# Patient Record
Sex: MALE | Race: WHITE | Employment: OTHER | URBAN - METROPOLITAN AREA
[De-identification: names, ages, dates, MRNs, and addresses within clinical notes are randomized per-mention and may not be internally consistent; named-entity substitution may affect disease eponyms.]

---

## 2017-01-01 ENCOUNTER — APPOINTMENT (OUTPATIENT)
Dept: GENERAL RADIOLOGY | Age: 82
DRG: 291 | End: 2017-01-01
Attending: NURSE PRACTITIONER
Payer: MEDICARE

## 2017-01-01 ENCOUNTER — HOME CARE VISIT (OUTPATIENT)
Dept: SCHEDULING | Facility: HOME HEALTH | Age: 82
End: 2017-01-01
Payer: MEDICARE

## 2017-01-01 ENCOUNTER — APPOINTMENT (OUTPATIENT)
Dept: GENERAL RADIOLOGY | Age: 82
DRG: 291 | End: 2017-01-01
Attending: EMERGENCY MEDICINE
Payer: MEDICARE

## 2017-01-01 ENCOUNTER — HOSPITAL ENCOUNTER (OUTPATIENT)
Dept: LAB | Age: 82
Discharge: HOME | End: 2017-01-27
Attending: INTERNAL MEDICINE
Payer: MEDICARE

## 2017-01-01 ENCOUNTER — HOME CARE VISIT (OUTPATIENT)
Dept: HOME HEALTH SERVICES | Facility: HOME HEALTH | Age: 82
End: 2017-01-01

## 2017-01-01 ENCOUNTER — HOME CARE VISIT (OUTPATIENT)
Dept: HOSPICE | Facility: HOSPICE | Age: 82
End: 2017-01-01
Payer: MEDICARE

## 2017-01-01 ENCOUNTER — HOSPITAL ENCOUNTER (OUTPATIENT)
Dept: LAB | Age: 82
Discharge: HOME | End: 2017-07-24
Payer: MEDICARE

## 2017-01-01 ENCOUNTER — HOSPITAL ENCOUNTER (OUTPATIENT)
Dept: LAB | Age: 82
Discharge: HOME | End: 2017-12-14
Attending: INTERNAL MEDICINE
Payer: MEDICARE

## 2017-01-01 ENCOUNTER — HOSPITAL ENCOUNTER (EMERGENCY)
Age: 82
Discharge: HOME | End: 2017-07-28
Attending: EMERGENCY MEDICINE
Payer: MEDICARE

## 2017-01-01 ENCOUNTER — HOME HEALTH ADMISSION (OUTPATIENT)
Dept: HOME HEALTH SERVICES | Facility: HOME HEALTH | Age: 82
End: 2017-01-01
Payer: MEDICARE

## 2017-01-01 ENCOUNTER — HOSPICE ADMISSION (OUTPATIENT)
Dept: HOSPICE | Facility: HOSPICE | Age: 82
End: 2017-01-01
Payer: MEDICARE

## 2017-01-01 ENCOUNTER — ANESTHESIA (OUTPATIENT)
Dept: SURGERY | Age: 82
DRG: 713 | End: 2017-01-01
Payer: MEDICARE

## 2017-01-01 ENCOUNTER — ANESTHESIA EVENT (OUTPATIENT)
Dept: SURGERY | Age: 82
DRG: 713 | End: 2017-01-01
Payer: MEDICARE

## 2017-01-01 ENCOUNTER — HOME CARE VISIT (OUTPATIENT)
Dept: HOME HEALTH SERVICES | Facility: HOME HEALTH | Age: 82
End: 2017-01-01
Payer: MEDICARE

## 2017-01-01 ENCOUNTER — HOSPITAL ENCOUNTER (OUTPATIENT)
Dept: LAB | Age: 82
Discharge: HOME | DRG: 291 | End: 2017-03-23
Payer: MEDICARE

## 2017-01-01 ENCOUNTER — TELEPHONE (OUTPATIENT)
Dept: CARDIOLOGY | Age: 82
End: 2017-01-01

## 2017-01-01 ENCOUNTER — HOSPITAL ENCOUNTER (INPATIENT)
Age: 82
LOS: 1 days | Discharge: HOME HEALTH | DRG: 713 | End: 2017-07-17
Attending: UROLOGY | Admitting: UROLOGY
Payer: MEDICARE

## 2017-01-01 ENCOUNTER — HOSPITAL ENCOUNTER (INPATIENT)
Age: 82
LOS: 5 days | Discharge: NURSING, REHAB OR LONG-TERM CARE | DRG: 291 | End: 2017-10-02
Attending: EMERGENCY MEDICINE | Admitting: INTERNAL MEDICINE
Payer: MEDICARE

## 2017-01-01 ENCOUNTER — HOSPITAL ENCOUNTER (INPATIENT)
Age: 82
LOS: 5 days | Discharge: NURSING, REHAB OR LONG-TERM CARE | DRG: 291 | End: 2017-03-28
Attending: EMERGENCY MEDICINE | Admitting: INTERNAL MEDICINE
Payer: MEDICARE

## 2017-01-01 ENCOUNTER — HOSPITAL ENCOUNTER (OUTPATIENT)
Dept: SURGERY | Age: 82
Discharge: HOME | End: 2017-07-11
Payer: MEDICARE

## 2017-01-01 ENCOUNTER — TELEPHONE (OUTPATIENT)
Dept: OTHER | Age: 82
End: 2017-01-01

## 2017-01-01 ENCOUNTER — APPOINTMENT (OUTPATIENT)
Dept: GENERAL RADIOLOGY | Age: 82
End: 2017-01-01
Attending: EMERGENCY MEDICINE
Payer: MEDICARE

## 2017-01-01 ENCOUNTER — DOCUMENTATION ONLY (OUTPATIENT)
Dept: OTHER | Age: 82
End: 2017-01-01

## 2017-01-01 ENCOUNTER — HOSPITAL ENCOUNTER (OUTPATIENT)
Dept: LAB | Age: 82
Discharge: HOME | End: 2017-06-26
Attending: INTERNAL MEDICINE
Payer: MEDICARE

## 2017-01-01 ENCOUNTER — APPOINTMENT (OUTPATIENT)
Dept: GENERAL RADIOLOGY | Age: 82
DRG: 291 | End: 2017-01-01
Payer: MEDICARE

## 2017-01-01 ENCOUNTER — PATIENT OUTREACH (OUTPATIENT)
Dept: CASE MANAGEMENT | Age: 82
End: 2017-01-01

## 2017-01-01 ENCOUNTER — SURGERY (OUTPATIENT)
Age: 82
End: 2017-01-01

## 2017-01-01 ENCOUNTER — APPOINTMENT (OUTPATIENT)
Dept: GENERAL RADIOLOGY | Age: 82
DRG: 291 | End: 2017-01-01
Attending: STUDENT IN AN ORGANIZED HEALTH CARE EDUCATION/TRAINING PROGRAM
Payer: MEDICARE

## 2017-01-01 VITALS
BODY MASS INDEX: 21.97 KG/M2 | WEIGHT: 140 LBS | OXYGEN SATURATION: 100 % | HEIGHT: 67 IN | RESPIRATION RATE: 16 BRPM | HEART RATE: 65 BPM | DIASTOLIC BLOOD PRESSURE: 61 MMHG | TEMPERATURE: 98.6 F | SYSTOLIC BLOOD PRESSURE: 130 MMHG

## 2017-01-01 VITALS — DIASTOLIC BLOOD PRESSURE: 60 MMHG | HEART RATE: 92 BPM | RESPIRATION RATE: 20 BRPM | SYSTOLIC BLOOD PRESSURE: 106 MMHG

## 2017-01-01 VITALS
DIASTOLIC BLOOD PRESSURE: 58 MMHG | HEIGHT: 65 IN | TEMPERATURE: 97.8 F | HEART RATE: 64 BPM | WEIGHT: 147.8 LBS | SYSTOLIC BLOOD PRESSURE: 133 MMHG | RESPIRATION RATE: 18 BRPM | OXYGEN SATURATION: 99 % | BODY MASS INDEX: 24.62 KG/M2

## 2017-01-01 VITALS
TEMPERATURE: 97.2 F | RESPIRATION RATE: 30 BRPM | DIASTOLIC BLOOD PRESSURE: 56 MMHG | HEART RATE: 92 BPM | SYSTOLIC BLOOD PRESSURE: 108 MMHG

## 2017-01-01 VITALS
TEMPERATURE: 96.5 F | SYSTOLIC BLOOD PRESSURE: 124 MMHG | DIASTOLIC BLOOD PRESSURE: 64 MMHG | HEART RATE: 67 BPM | OXYGEN SATURATION: 98 % | RESPIRATION RATE: 17 BRPM

## 2017-01-01 VITALS
TEMPERATURE: 98.1 F | HEART RATE: 67 BPM | RESPIRATION RATE: 20 BRPM | DIASTOLIC BLOOD PRESSURE: 51 MMHG | SYSTOLIC BLOOD PRESSURE: 104 MMHG | BODY MASS INDEX: 22.63 KG/M2 | WEIGHT: 140.8 LBS | HEIGHT: 66 IN | OXYGEN SATURATION: 98 %

## 2017-01-01 VITALS
TEMPERATURE: 98 F | BODY MASS INDEX: 23.86 KG/M2 | HEIGHT: 67 IN | OXYGEN SATURATION: 96 % | SYSTOLIC BLOOD PRESSURE: 136 MMHG | WEIGHT: 152 LBS | RESPIRATION RATE: 16 BRPM | HEART RATE: 72 BPM | DIASTOLIC BLOOD PRESSURE: 62 MMHG

## 2017-01-01 VITALS
RESPIRATION RATE: 17 BRPM | DIASTOLIC BLOOD PRESSURE: 60 MMHG | OXYGEN SATURATION: 95 % | SYSTOLIC BLOOD PRESSURE: 132 MMHG | TEMPERATURE: 96.4 F | HEART RATE: 64 BPM

## 2017-01-01 VITALS
SYSTOLIC BLOOD PRESSURE: 143 MMHG | RESPIRATION RATE: 18 BRPM | WEIGHT: 151.2 LBS | DIASTOLIC BLOOD PRESSURE: 68 MMHG | HEIGHT: 67 IN | OXYGEN SATURATION: 95 % | HEART RATE: 74 BPM | TEMPERATURE: 98 F | BODY MASS INDEX: 23.73 KG/M2

## 2017-01-01 VITALS — DIASTOLIC BLOOD PRESSURE: 60 MMHG | HEART RATE: 96 BPM | RESPIRATION RATE: 28 BRPM | SYSTOLIC BLOOD PRESSURE: 104 MMHG

## 2017-01-01 VITALS — SYSTOLIC BLOOD PRESSURE: 98 MMHG | HEART RATE: 96 BPM | DIASTOLIC BLOOD PRESSURE: 46 MMHG | RESPIRATION RATE: 32 BRPM

## 2017-01-01 VITALS
RESPIRATION RATE: 17 BRPM | SYSTOLIC BLOOD PRESSURE: 115 MMHG | TEMPERATURE: 97 F | DIASTOLIC BLOOD PRESSURE: 70 MMHG | OXYGEN SATURATION: 95 % | HEART RATE: 97 BPM

## 2017-01-01 VITALS — SYSTOLIC BLOOD PRESSURE: 96 MMHG | HEART RATE: 80 BPM | RESPIRATION RATE: 16 BRPM | DIASTOLIC BLOOD PRESSURE: 48 MMHG

## 2017-01-01 VITALS
HEIGHT: 66 IN | SYSTOLIC BLOOD PRESSURE: 124 MMHG | OXYGEN SATURATION: 99 % | WEIGHT: 140 LBS | BODY MASS INDEX: 22.5 KG/M2 | RESPIRATION RATE: 32 BRPM | HEART RATE: 72 BPM | DIASTOLIC BLOOD PRESSURE: 60 MMHG

## 2017-01-01 VITALS — SYSTOLIC BLOOD PRESSURE: 116 MMHG | DIASTOLIC BLOOD PRESSURE: 50 MMHG

## 2017-01-01 DIAGNOSIS — I48.0 PAF (PAROXYSMAL ATRIAL FIBRILLATION) (HCC): Chronic | ICD-10-CM

## 2017-01-01 DIAGNOSIS — I50.22 CHRONIC SYSTOLIC HEART FAILURE (HCC): Chronic | ICD-10-CM

## 2017-01-01 DIAGNOSIS — J96.01 ACUTE RESPIRATORY FAILURE WITH HYPOXIA (HCC): ICD-10-CM

## 2017-01-01 DIAGNOSIS — Z66 DNR (DO NOT RESUSCITATE): ICD-10-CM

## 2017-01-01 DIAGNOSIS — Z95.810 ICD (IMPLANTABLE CARDIOVERTER-DEFIBRILLATOR) IN PLACE: Chronic | ICD-10-CM

## 2017-01-01 DIAGNOSIS — I50.23 ACUTE ON CHRONIC SYSTOLIC CONGESTIVE HEART FAILURE (HCC): ICD-10-CM

## 2017-01-01 DIAGNOSIS — N18.30 CKD (CHRONIC KIDNEY DISEASE) STAGE 3, GFR 30-59 ML/MIN (HCC): Chronic | ICD-10-CM

## 2017-01-01 DIAGNOSIS — N18.9 CHRONIC KIDNEY DISEASE (CKD), UNSPECIFIED STAGE: ICD-10-CM

## 2017-01-01 DIAGNOSIS — R07.89 CHEST WALL PAIN: Primary | ICD-10-CM

## 2017-01-01 DIAGNOSIS — I10 ESSENTIAL HYPERTENSION: Chronic | ICD-10-CM

## 2017-01-01 DIAGNOSIS — R53.83 MALAISE AND FATIGUE: ICD-10-CM

## 2017-01-01 DIAGNOSIS — J90 PLEURAL EFFUSION, BILATERAL: ICD-10-CM

## 2017-01-01 DIAGNOSIS — J43.9 PULMONARY EMPHYSEMA, UNSPECIFIED EMPHYSEMA TYPE (HCC): ICD-10-CM

## 2017-01-01 DIAGNOSIS — E87.6 HYPOKALEMIA: ICD-10-CM

## 2017-01-01 DIAGNOSIS — J81.0 ACUTE PULMONARY EDEMA (HCC): ICD-10-CM
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
## 2017-01-01 DIAGNOSIS — I48.0 PAROXYSMAL ATRIAL FIBRILLATION (HCC): Chronic | ICD-10-CM

## 2017-01-01 DIAGNOSIS — E78.00 PURE HYPERCHOLESTEROLEMIA: Chronic | ICD-10-CM

## 2017-01-01 DIAGNOSIS — R29.6 FREQUENT FALLS: ICD-10-CM

## 2017-01-01 DIAGNOSIS — I50.9 CONGESTIVE HEART FAILURE, UNSPECIFIED CONGESTIVE HEART FAILURE CHRONICITY, UNSPECIFIED CONGESTIVE HEART FAILURE TYPE: Chronic | ICD-10-CM

## 2017-01-01 DIAGNOSIS — J90 PLEURAL EFFUSION: ICD-10-CM

## 2017-01-01 DIAGNOSIS — J43.9 PULMONARY EMPHYSEMA, UNSPECIFIED EMPHYSEMA TYPE (HCC): Chronic | ICD-10-CM

## 2017-01-01 DIAGNOSIS — I50.23 ACUTE ON CHRONIC SYSTOLIC CONGESTIVE HEART FAILURE (HCC): Primary | ICD-10-CM

## 2017-01-01 DIAGNOSIS — I50.23 ACUTE ON CHRONIC SYSTOLIC (CONGESTIVE) HEART FAILURE (HCC): Primary | ICD-10-CM

## 2017-01-01 DIAGNOSIS — R53.81 MALAISE AND FATIGUE: ICD-10-CM

## 2017-01-01 DIAGNOSIS — R09.02 HYPOXIA: ICD-10-CM

## 2017-01-01 DIAGNOSIS — I25.119 CORONARY ARTERY DISEASE WITH ANGINA PECTORIS, UNSPECIFIED VESSEL OR LESION TYPE, UNSPECIFIED WHETHER NATIVE OR TRANSPLANTED HEART (HCC): Chronic | ICD-10-CM

## 2017-01-01 LAB
ABO + RH BLD: NORMAL
ALBUMIN SERPL BCP-MCNC: 2.8 G/DL (ref 3.2–4.6)
ALBUMIN SERPL BCP-MCNC: 3.4 G/DL (ref 3.2–4.6)
ALBUMIN SERPL-MCNC: 3.1 G/DL (ref 3.2–4.6)
ALBUMIN/GLOB SERPL: 0.8 {RATIO} (ref 1.2–3.5)
ALBUMIN/GLOB SERPL: 0.8 {RATIO} (ref 1.2–3.5)
ALBUMIN/GLOB SERPL: 1 {RATIO} (ref 1.2–3.5)
ALP SERPL-CCNC: 70 U/L (ref 50–136)
ALP SERPL-CCNC: 73 U/L (ref 50–136)
ALP SERPL-CCNC: 76 U/L (ref 50–136)
ALT SERPL-CCNC: 13 U/L (ref 12–65)
ALT SERPL-CCNC: 24 U/L (ref 12–65)
ALT SERPL-CCNC: 27 U/L (ref 12–65)
ANION GAP BLD CALC-SCNC: 10 MMOL/L (ref 7–16)
ANION GAP BLD CALC-SCNC: 11 MMOL/L (ref 7–16)
ANION GAP BLD CALC-SCNC: 2 MMOL/L (ref 7–16)
ANION GAP BLD CALC-SCNC: 5 MMOL/L
ANION GAP BLD CALC-SCNC: 5 MMOL/L (ref 7–16)
ANION GAP BLD CALC-SCNC: 6 MMOL/L
ANION GAP BLD CALC-SCNC: 7 MMOL/L (ref 7–16)
ANION GAP BLD CALC-SCNC: 7 MMOL/L (ref 7–16)
ANION GAP BLD CALC-SCNC: 9 MMOL/L (ref 7–16)
ANION GAP BLD CALC-SCNC: ABNORMAL MMOL/L (ref 7–16)
ANION GAP SERPL CALC-SCNC: 5 MMOL/L (ref 7–16)
ANION GAP SERPL CALC-SCNC: 7 MMOL/L (ref 7–16)
ANION GAP SERPL CALC-SCNC: 8 MMOL/L (ref 7–16)
APPEARANCE FLD: NORMAL
APPEARANCE UR: ABNORMAL
APPEARANCE UR: CLEAR
APPEARANCE UR: CLEAR
AST SERPL W P-5'-P-CCNC: 13 U/L (ref 15–37)
AST SERPL W P-5'-P-CCNC: 23 U/L (ref 15–37)
AST SERPL-CCNC: 19 U/L (ref 15–37)
ATRIAL RATE: 104 BPM
ATRIAL RATE: 69 BPM
BACTERIA SPEC CULT: NORMAL
BACTERIA SPEC CULT: NORMAL
BACTERIA URNS QL MICRO: 0 /HPF
BASOPHILS # BLD AUTO: 0 K/UL (ref 0–0.2)
BASOPHILS # BLD: 0 % (ref 0–2)
BASOPHILS # BLD: 0 K/UL (ref 0–0.2)
BASOPHILS NFR BLD: 0 % (ref 0–2)
BILIRUB SERPL-MCNC: 0.5 MG/DL (ref 0.2–1.1)
BILIRUB SERPL-MCNC: 0.6 MG/DL (ref 0.2–1.1)
BILIRUB SERPL-MCNC: 0.9 MG/DL (ref 0.2–1.1)
BILIRUB UR QL: ABNORMAL
BILIRUB UR QL: NEGATIVE
BILIRUB UR QL: NEGATIVE
BLOOD GROUP ANTIBODIES SERPL: NORMAL
BNP SERPL-MCNC: 1366 PG/ML
BNP SERPL-MCNC: 1654 PG/ML
BNP SERPL-MCNC: 1668 PG/ML
BNP SERPL-MCNC: 1757 PG/ML
BNP SERPL-MCNC: 1967 PG/ML
BNP SERPL-MCNC: 2777 PG/ML
BNP SERPL-MCNC: 3765 PG/ML
BNP SERPL-MCNC: 697 PG/ML
BNP SERPL-MCNC: 889 PG/ML
BNP SERPL-MCNC: 911 PG/ML
BNP SERPL-MCNC: 912 PG/ML
BUN SERPL-MCNC: 15 MG/DL (ref 8–23)
BUN SERPL-MCNC: 16 MG/DL (ref 8–23)
BUN SERPL-MCNC: 20 MG/DL (ref 8–23)
BUN SERPL-MCNC: 20 MG/DL (ref 8–23)
BUN SERPL-MCNC: 22 MG/DL (ref 8–23)
BUN SERPL-MCNC: 23 MG/DL (ref 8–23)
BUN SERPL-MCNC: 25 MG/DL (ref 8–23)
BUN SERPL-MCNC: 26 MG/DL (ref 8–23)
BUN SERPL-MCNC: 28 MG/DL (ref 8–23)
BUN SERPL-MCNC: 28 MG/DL (ref 8–23)
BUN SERPL-MCNC: 29 MG/DL (ref 8–23)
BUN SERPL-MCNC: 31 MG/DL (ref 8–23)
BUN SERPL-MCNC: 31 MG/DL (ref 8–23)
CALCIUM SERPL-MCNC: 7.6 MG/DL (ref 8.3–10.4)
CALCIUM SERPL-MCNC: 7.7 MG/DL (ref 8.3–10.4)
CALCIUM SERPL-MCNC: 7.7 MG/DL (ref 8.3–10.4)
CALCIUM SERPL-MCNC: 7.8 MG/DL (ref 8.3–10.4)
CALCIUM SERPL-MCNC: 7.8 MG/DL (ref 8.3–10.4)
CALCIUM SERPL-MCNC: 8.1 MG/DL (ref 8.3–10.4)
CALCIUM SERPL-MCNC: 8.2 MG/DL (ref 8.3–10.4)
CALCIUM SERPL-MCNC: 8.3 MG/DL (ref 8.3–10.4)
CALCIUM SERPL-MCNC: 8.5 MG/DL (ref 8.3–10.4)
CALCIUM SERPL-MCNC: 8.5 MG/DL (ref 8.3–10.4)
CALCIUM SERPL-MCNC: 8.6 MG/DL (ref 8.3–10.4)
CALCIUM SERPL-MCNC: 8.6 MG/DL (ref 8.3–10.4)
CALCIUM SERPL-MCNC: 8.7 MG/DL (ref 8.3–10.4)
CALCIUM SERPL-MCNC: 8.8 MG/DL (ref 8.3–10.4)
CALCULATED R AXIS, ECG10: -5 DEGREES
CALCULATED R AXIS, ECG10: 15 DEGREES
CALCULATED T AXIS, ECG11: -173 DEGREES
CALCULATED T AXIS, ECG11: 16 DEGREES
CASTS URNS QL MICRO: ABNORMAL /LPF
CHLORIDE SERPL-SCNC: 79 MMOL/L (ref 98–107)
CHLORIDE SERPL-SCNC: 84 MMOL/L (ref 98–107)
CHLORIDE SERPL-SCNC: 92 MMOL/L (ref 98–107)
CHLORIDE SERPL-SCNC: 92 MMOL/L (ref 98–107)
CHLORIDE SERPL-SCNC: 93 MMOL/L (ref 98–107)
CHLORIDE SERPL-SCNC: 93 MMOL/L (ref 98–107)
CHLORIDE SERPL-SCNC: 94 MMOL/L (ref 98–107)
CHLORIDE SERPL-SCNC: 95 MMOL/L (ref 98–107)
CHLORIDE SERPL-SCNC: 96 MMOL/L (ref 98–107)
CHLORIDE SERPL-SCNC: 96 MMOL/L (ref 98–107)
CHLORIDE SERPL-SCNC: 97 MMOL/L (ref 98–107)
CHLORIDE SERPL-SCNC: 98 MMOL/L (ref 98–107)
CHLORIDE SERPL-SCNC: 99 MMOL/L (ref 98–107)
CHLORIDE SERPL-SCNC: 99 MMOL/L (ref 98–107)
CO2 SERPL-SCNC: 30 MMOL/L (ref 21–32)
CO2 SERPL-SCNC: 32 MMOL/L (ref 21–32)
CO2 SERPL-SCNC: 33 MMOL/L (ref 21–32)
CO2 SERPL-SCNC: 33 MMOL/L (ref 21–32)
CO2 SERPL-SCNC: 34 MMOL/L (ref 21–32)
CO2 SERPL-SCNC: 35 MMOL/L (ref 21–32)
CO2 SERPL-SCNC: 35 MMOL/L (ref 23–32)
CO2 SERPL-SCNC: 36 MMOL/L (ref 21–32)
CO2 SERPL-SCNC: 36 MMOL/L (ref 21–32)
CO2 SERPL-SCNC: 37 MMOL/L (ref 21–32)
CO2 SERPL-SCNC: 37 MMOL/L (ref 21–32)
CO2 SERPL-SCNC: 39 MMOL/L (ref 21–32)
CO2 SERPL-SCNC: 41 MMOL/L (ref 21–32)
CO2 SERPL-SCNC: >45 MMOL/L (ref 21–32)
COLOR FLD: YELLOW
COLOR UR: ABNORMAL
COLOR UR: YELLOW
COLOR UR: YELLOW
CREAT SERPL-MCNC: 1.58 MG/DL (ref 0.8–1.5)
CREAT SERPL-MCNC: 1.6 MG/DL (ref 0.6–1)
CREAT SERPL-MCNC: 1.6 MG/DL (ref 0.8–1.5)
CREAT SERPL-MCNC: 1.69 MG/DL (ref 0.8–1.5)
CREAT SERPL-MCNC: 1.91 MG/DL (ref 0.8–1.5)
CREAT SERPL-MCNC: 1.95 MG/DL (ref 0.8–1.5)
CREAT SERPL-MCNC: 1.96 MG/DL (ref 0.8–1.5)
CREAT SERPL-MCNC: 2.03 MG/DL (ref 0.8–1.5)
CREAT SERPL-MCNC: 2.1 MG/DL (ref 0.8–1.5)
CREAT SERPL-MCNC: 2.26 MG/DL (ref 0.8–1.5)
CREAT SERPL-MCNC: 2.28 MG/DL (ref 0.8–1.5)
CREAT SERPL-MCNC: 2.36 MG/DL (ref 0.8–1.5)
CREAT SERPL-MCNC: 2.47 MG/DL (ref 0.8–1.5)
CREAT SERPL-MCNC: 2.47 MG/DL (ref 0.8–1.5)
CREAT SERPL-MCNC: 2.62 MG/DL (ref 0.8–1.5)
CREAT SERPL-MCNC: 2.65 MG/DL (ref 0.8–1.5)
DIAGNOSIS, 93000: NORMAL
DIAGNOSIS, 93000: NORMAL
DIFFERENTIAL METHOD BLD: ABNORMAL
EOSINOPHIL # BLD: 0 K/UL (ref 0–0.8)
EOSINOPHIL # BLD: 0 K/UL (ref 0–0.8)
EOSINOPHIL # BLD: 0.1 K/UL (ref 0–0.8)
EOSINOPHIL # BLD: 0.2 K/UL (ref 0–0.8)
EOSINOPHIL NFR BLD: 0 % (ref 0.5–7.8)
EOSINOPHIL NFR BLD: 0 % (ref 0.5–7.8)
EOSINOPHIL NFR BLD: 1 % (ref 0.5–7.8)
EOSINOPHIL NFR BLD: 1 % (ref 0.5–7.8)
EOSINOPHIL NFR BLD: 2 % (ref 0.5–7.8)
EOSINOPHIL NFR BLD: 2 % (ref 0.5–7.8)
EOSINOPHIL NFR BLD: 3 % (ref 0.5–7.8)
ERYTHROCYTE [DISTWIDTH] IN BLOOD BY AUTOMATED COUNT: 13.8 % (ref 11.9–14.6)
ERYTHROCYTE [DISTWIDTH] IN BLOOD BY AUTOMATED COUNT: 14.3 % (ref 11.9–14.6)
ERYTHROCYTE [DISTWIDTH] IN BLOOD BY AUTOMATED COUNT: 14.6 % (ref 11.9–14.6)
ERYTHROCYTE [DISTWIDTH] IN BLOOD BY AUTOMATED COUNT: 14.7 % (ref 11.9–14.6)
ERYTHROCYTE [DISTWIDTH] IN BLOOD BY AUTOMATED COUNT: 14.8 % (ref 11.9–14.6)
ERYTHROCYTE [DISTWIDTH] IN BLOOD BY AUTOMATED COUNT: 14.9 % (ref 11.9–14.6)
ERYTHROCYTE [DISTWIDTH] IN BLOOD BY AUTOMATED COUNT: 14.9 % (ref 11.9–14.6)
GLOBULIN SER CALC-MCNC: 3.5 G/DL (ref 2.3–3.5)
GLOBULIN SER CALC-MCNC: 3.6 G/DL (ref 2.3–3.5)
GLOBULIN SER CALC-MCNC: 4 G/DL (ref 2.3–3.5)
GLUCOSE FLD-MCNC: 125 MG/DL
GLUCOSE SERPL-MCNC: 101 MG/DL (ref 65–100)
GLUCOSE SERPL-MCNC: 102 MG/DL (ref 65–100)
GLUCOSE SERPL-MCNC: 102 MG/DL (ref 65–100)
GLUCOSE SERPL-MCNC: 115 MG/DL (ref 65–100)
GLUCOSE SERPL-MCNC: 115 MG/DL (ref 65–100)
GLUCOSE SERPL-MCNC: 69 MG/DL (ref 65–100)
GLUCOSE SERPL-MCNC: 70 MG/DL (ref 65–100)
GLUCOSE SERPL-MCNC: 73 MG/DL (ref 65–100)
GLUCOSE SERPL-MCNC: 83 MG/DL (ref 65–100)
GLUCOSE SERPL-MCNC: 86 MG/DL (ref 65–100)
GLUCOSE SERPL-MCNC: 86 MG/DL (ref 65–100)
GLUCOSE SERPL-MCNC: 87 MG/DL (ref 65–100)
GLUCOSE SERPL-MCNC: 88 MG/DL (ref 65–100)
GLUCOSE SERPL-MCNC: 95 MG/DL (ref 65–100)
GLUCOSE SERPL-MCNC: 96 MG/DL (ref 65–100)
GLUCOSE SERPL-MCNC: 98 MG/DL (ref 65–100)
GLUCOSE UR STRIP.AUTO-MCNC: NEGATIVE MG/DL
HCT VFR BLD AUTO: 31.1 % (ref 41.1–50.3)
HCT VFR BLD AUTO: 31.7 % (ref 41.1–50.3)
HCT VFR BLD AUTO: 34 % (ref 41.1–50.3)
HCT VFR BLD AUTO: 34.6 % (ref 41.1–50.3)
HCT VFR BLD AUTO: 37.1 % (ref 41.1–50.3)
HCT VFR BLD AUTO: 38.1 % (ref 41.1–50.3)
HCT VFR BLD AUTO: 41.6 % (ref 41.1–50.3)
HCT VFR BLD AUTO: 42.9 % (ref 41.1–50.3)
HCT VFR BLD AUTO: 43.7 % (ref 41.1–50.3)
HGB BLD-MCNC: 10.3 G/DL (ref 13.6–17.2)
HGB BLD-MCNC: 10.5 G/DL (ref 13.6–17.2)
HGB BLD-MCNC: 11 G/DL (ref 13.6–17.2)
HGB BLD-MCNC: 11.4 G/DL (ref 13.6–17.2)
HGB BLD-MCNC: 12 G/DL (ref 13.6–17.2)
HGB BLD-MCNC: 12.4 G/DL (ref 13.6–17.2)
HGB BLD-MCNC: 13.2 G/DL (ref 13.6–17.2)
HGB BLD-MCNC: 13.3 G/DL (ref 13.6–17.2)
HGB BLD-MCNC: 13.5 G/DL (ref 13.6–17.2)
HGB UR QL STRIP: ABNORMAL
HGB UR QL STRIP: NEGATIVE
HGB UR QL STRIP: NEGATIVE
IMM GRANULOCYTES # BLD: 0 K/UL (ref 0–0.5)
IMM GRANULOCYTES NFR BLD AUTO: 0 % (ref 0–5)
IMM GRANULOCYTES NFR BLD AUTO: 0.2 % (ref 0–5)
IMM GRANULOCYTES NFR BLD: 0 % (ref 0–5)
IMM GRANULOCYTES NFR BLD: 0.2 % (ref 0–5)
IMM GRANULOCYTES NFR BLD: 0.2 % (ref 0–5)
KETONES UR QL STRIP.AUTO: 40 MG/DL
KETONES UR QL STRIP.AUTO: NEGATIVE MG/DL
KETONES UR QL STRIP.AUTO: NEGATIVE MG/DL
LDH FLD L TO P-CCNC: 50 U/L
LEUKOCYTE ESTERASE UR QL STRIP.AUTO: ABNORMAL
LEUKOCYTE ESTERASE UR QL STRIP.AUTO: NEGATIVE
LEUKOCYTE ESTERASE UR QL STRIP.AUTO: NEGATIVE
LYMPHOCYTES # BLD AUTO: 16 % (ref 13–44)
LYMPHOCYTES # BLD AUTO: 20 % (ref 13–44)
LYMPHOCYTES # BLD AUTO: 23 % (ref 13–44)
LYMPHOCYTES # BLD AUTO: 9 % (ref 13–44)
LYMPHOCYTES # BLD: 0.5 K/UL (ref 0.5–4.6)
LYMPHOCYTES # BLD: 0.6 K/UL (ref 0.5–4.6)
LYMPHOCYTES # BLD: 0.6 K/UL (ref 0.5–4.6)
LYMPHOCYTES # BLD: 0.8 K/UL (ref 0.5–4.6)
LYMPHOCYTES # BLD: 0.8 K/UL (ref 0.5–4.6)
LYMPHOCYTES # BLD: 0.9 K/UL (ref 0.5–4.6)
LYMPHOCYTES # BLD: 1.2 K/UL (ref 0.5–4.6)
LYMPHOCYTES NFR BLD: 12 % (ref 13–44)
LYMPHOCYTES NFR BLD: 14 % (ref 13–44)
LYMPHOCYTES NFR BLD: 8 % (ref 13–44)
MAGNESIUM SERPL-MCNC: 1.7 MG/DL (ref 1.8–2.4)
MAGNESIUM SERPL-MCNC: 2 MG/DL (ref 1.8–2.4)
MAGNESIUM SERPL-MCNC: 2 MG/DL (ref 1.8–2.4)
MAGNESIUM SERPL-MCNC: 2.2 MG/DL (ref 1.8–2.4)
MAGNESIUM SERPL-MCNC: 2.2 MG/DL (ref 1.8–2.4)
MAGNESIUM SERPL-MCNC: 2.3 MG/DL (ref 1.8–2.4)
MAGNESIUM SERPL-MCNC: 2.4 MG/DL (ref 1.8–2.4)
MCH RBC QN AUTO: 29.2 PG (ref 26.1–32.9)
MCH RBC QN AUTO: 29.2 PG (ref 26.1–32.9)
MCH RBC QN AUTO: 29.8 PG (ref 26.1–32.9)
MCH RBC QN AUTO: 30 PG (ref 26.1–32.9)
MCH RBC QN AUTO: 30.5 PG (ref 26.1–32.9)
MCH RBC QN AUTO: 30.8 PG (ref 26.1–32.9)
MCH RBC QN AUTO: 31.3 PG (ref 26.1–32.9)
MCH RBC QN AUTO: 31.3 PG (ref 26.1–32.9)
MCH RBC QN AUTO: 31.4 PG (ref 26.1–32.9)
MCHC RBC AUTO-ENTMCNC: 30.8 G/DL (ref 31.4–35)
MCHC RBC AUTO-ENTMCNC: 30.9 G/DL (ref 31.4–35)
MCHC RBC AUTO-ENTMCNC: 31.5 G/DL (ref 31.4–35)
MCHC RBC AUTO-ENTMCNC: 31.8 G/DL (ref 31.4–35)
MCHC RBC AUTO-ENTMCNC: 32 G/DL (ref 31.4–35)
MCHC RBC AUTO-ENTMCNC: 33.1 G/DL (ref 31.4–35)
MCHC RBC AUTO-ENTMCNC: 33.1 G/DL (ref 31.4–35)
MCHC RBC AUTO-ENTMCNC: 33.4 G/DL (ref 31.4–35)
MCHC RBC AUTO-ENTMCNC: 33.5 G/DL (ref 31.4–35)
MCV RBC AUTO: 91.4 FL (ref 79.6–97.8)
MCV RBC AUTO: 93 FL (ref 79.6–97.8)
MCV RBC AUTO: 93.1 FL (ref 79.6–97.8)
MCV RBC AUTO: 93.7 FL (ref 79.6–97.8)
MCV RBC AUTO: 94.4 FL (ref 79.6–97.8)
MCV RBC AUTO: 94.5 FL (ref 79.6–97.8)
MCV RBC AUTO: 94.9 FL (ref 79.6–97.8)
MCV RBC AUTO: 95.3 FL (ref 79.6–97.8)
MCV RBC AUTO: 98.3 FL (ref 79.6–97.8)
MM INDURATION POC: 0 MM (ref 0–5)
MONOCYTES # BLD: 0.3 K/UL (ref 0.1–1.3)
MONOCYTES # BLD: 0.5 K/UL (ref 0.1–1.3)
MONOCYTES # BLD: 0.5 K/UL (ref 0.1–1.3)
MONOCYTES # BLD: 0.6 K/UL (ref 0.1–1.3)
MONOCYTES # BLD: 0.7 K/UL (ref 0.1–1.3)
MONOCYTES NFR BLD AUTO: 11 % (ref 4–12)
MONOCYTES NFR BLD AUTO: 6 % (ref 4–12)
MONOCYTES NFR BLD AUTO: 7 % (ref 4–12)
MONOCYTES NFR BLD AUTO: 8 % (ref 4–12)
MONOCYTES NFR BLD: 11 % (ref 4–12)
MONOCYTES NFR BLD: 5 % (ref 4–12)
MONOCYTES NFR BLD: 9 % (ref 4–12)
NEUTS SEG # BLD: 3.1 K/UL (ref 1.7–8.2)
NEUTS SEG # BLD: 3.1 K/UL (ref 1.7–8.2)
NEUTS SEG # BLD: 3.3 K/UL (ref 1.7–8.2)
NEUTS SEG # BLD: 4 K/UL (ref 1.7–8.2)
NEUTS SEG # BLD: 4.2 K/UL (ref 1.7–8.2)
NEUTS SEG # BLD: 4.7 K/UL (ref 1.7–8.2)
NEUTS SEG # BLD: 7.6 K/UL (ref 1.7–8.2)
NEUTS SEG NFR BLD AUTO: 63 % (ref 43–78)
NEUTS SEG NFR BLD AUTO: 71 % (ref 43–78)
NEUTS SEG NFR BLD AUTO: 78 % (ref 43–78)
NEUTS SEG NFR BLD AUTO: 82 % (ref 43–78)
NEUTS SEG NFR BLD: 73 % (ref 43–78)
NEUTS SEG NFR BLD: 82 % (ref 43–78)
NEUTS SEG NFR BLD: 83 % (ref 43–78)
NITRITE UR QL STRIP.AUTO: NEGATIVE
NITRITE UR QL STRIP.AUTO: NEGATIVE
NITRITE UR QL STRIP.AUTO: POSITIVE
NUC CELL # FLD: 83 /CU MM
OTHER OBSERVATIONS,UCOM: ABNORMAL
PH UR STRIP: 5 [PH] (ref 5–9)
PH UR STRIP: 6 [PH] (ref 5–9)
PH UR STRIP: 6 [PH] (ref 5–9)
PLATELET # BLD AUTO: 114 K/UL (ref 150–450)
PLATELET # BLD AUTO: 120 K/UL (ref 150–450)
PLATELET # BLD AUTO: 123 K/UL (ref 150–450)
PLATELET # BLD AUTO: 129 K/UL (ref 150–450)
PLATELET # BLD AUTO: 133 K/UL (ref 150–450)
PLATELET # BLD AUTO: 135 K/UL (ref 150–450)
PLATELET # BLD AUTO: 135 K/UL (ref 150–450)
PLATELET # BLD AUTO: 137 K/UL (ref 150–450)
PLATELET # BLD AUTO: 173 K/UL (ref 150–450)
PMV BLD AUTO: 10.1 FL (ref 10.8–14.1)
PMV BLD AUTO: 10.3 FL (ref 10.8–14.1)
PMV BLD AUTO: 10.5 FL (ref 10.8–14.1)
PMV BLD AUTO: 11.1 FL (ref 10.8–14.1)
PMV BLD AUTO: 11.1 FL (ref 10.8–14.1)
PMV BLD AUTO: 11.6 FL (ref 10.8–14.1)
PMV BLD AUTO: 9.6 FL (ref 10.8–14.1)
PMV BLD AUTO: 9.8 FL (ref 10.8–14.1)
PMV BLD AUTO: 9.8 FL (ref 10.8–14.1)
POTASSIUM SERPL-SCNC: 2.8 MMOL/L (ref 3.5–5.1)
POTASSIUM SERPL-SCNC: 3.3 MMOL/L (ref 3.5–5.1)
POTASSIUM SERPL-SCNC: 3.3 MMOL/L (ref 3.5–5.1)
POTASSIUM SERPL-SCNC: 3.5 MMOL/L (ref 3.5–5.1)
POTASSIUM SERPL-SCNC: 3.8 MMOL/L (ref 3.5–5.1)
POTASSIUM SERPL-SCNC: 3.9 MMOL/L (ref 3.5–5.1)
POTASSIUM SERPL-SCNC: 4.1 MMOL/L (ref 3.5–5.1)
POTASSIUM SERPL-SCNC: 4.1 MMOL/L (ref 3.5–5.1)
POTASSIUM SERPL-SCNC: 4.2 MMOL/L (ref 3.5–5.1)
POTASSIUM SERPL-SCNC: 4.6 MMOL/L (ref 3.5–5.1)
POTASSIUM SERPL-SCNC: 4.8 MMOL/L (ref 3.5–5.1)
POTASSIUM SERPL-SCNC: 4.9 MMOL/L (ref 3.5–5.1)
PPD POC: NEGATIVE NEGATIVE
PPD POC: NORMAL NEGATIVE
PROT FLD-MCNC: 1 G/DL
PROT SERPL-MCNC: 6.4 G/DL (ref 6.3–8.2)
PROT SERPL-MCNC: 6.9 G/DL (ref 6.3–8.2)
PROT SERPL-MCNC: 7.1 G/DL (ref 6.3–8.2)
PROT UR STRIP-MCNC: 300 MG/DL
PROT UR STRIP-MCNC: NEGATIVE MG/DL
PROT UR STRIP-MCNC: NEGATIVE MG/DL
Q-T INTERVAL, ECG07: 450 MS
Q-T INTERVAL, ECG07: 466 MS
QRS DURATION, ECG06: 82 MS
QRS DURATION, ECG06: 94 MS
QTC CALCULATION (BEZET), ECG08: 568 MS
QTC CALCULATION (BEZET), ECG08: 576 MS
RBC # BLD AUTO: 3.29 M/UL (ref 4.23–5.67)
RBC # BLD AUTO: 3.41 M/UL (ref 4.23–5.67)
RBC # BLD AUTO: 3.52 M/UL (ref 4.23–5.67)
RBC # BLD AUTO: 3.63 M/UL (ref 4.23–5.67)
RBC # BLD AUTO: 4 M/UL (ref 4.23–5.67)
RBC # BLD AUTO: 4.06 M/UL (ref 4.23–5.67)
RBC # BLD AUTO: 4.47 M/UL (ref 4.23–5.67)
RBC # BLD AUTO: 4.52 M/UL (ref 4.23–5.67)
RBC # BLD AUTO: 4.63 M/UL (ref 4.23–5.67)
RBC # FLD: NORMAL /CU MM
RBC #/AREA URNS HPF: >100 /HPF
SERVICE CMNT-IMP: NORMAL
SERVICE CMNT-IMP: NORMAL
SODIUM SERPL-SCNC: 131 MMOL/L (ref 136–145)
SODIUM SERPL-SCNC: 132 MMOL/L (ref 136–145)
SODIUM SERPL-SCNC: 132 MMOL/L (ref 136–145)
SODIUM SERPL-SCNC: 133 MMOL/L (ref 136–145)
SODIUM SERPL-SCNC: 133 MMOL/L (ref 136–145)
SODIUM SERPL-SCNC: 134 MMOL/L (ref 136–145)
SODIUM SERPL-SCNC: 135 MMOL/L (ref 136–145)
SODIUM SERPL-SCNC: 135 MMOL/L (ref 136–145)
SODIUM SERPL-SCNC: 137 MMOL/L (ref 136–145)
SODIUM SERPL-SCNC: 138 MMOL/L (ref 136–145)
SODIUM SERPL-SCNC: 138 MMOL/L (ref 136–145)
SODIUM SERPL-SCNC: 139 MMOL/L (ref 136–145)
SODIUM SERPL-SCNC: 140 MMOL/L (ref 136–145)
SODIUM SERPL-SCNC: 142 MMOL/L (ref 136–145)
SP GR UR REFRACTOMETRY: 1.01 (ref 1–1.02)
SPECIMEN EXP DATE BLD: NORMAL
SPECIMEN SOURCE FLD: NORMAL
TROPONIN I BLD-MCNC: 0.07 NG/ML (ref 0–0.08)
TROPONIN I BLD-MCNC: 0.07 NG/ML (ref 0–0.08)
TROPONIN I SERPL-MCNC: 0.06 NG/ML (ref 0.02–0.05)
TROPONIN I SERPL-MCNC: <0.02 NG/ML (ref 0.02–0.05)
UROBILINOGEN UR QL STRIP.AUTO: 0.2 EU/DL (ref 0.2–1)
UROBILINOGEN UR QL STRIP.AUTO: 1 EU/DL (ref 0.2–1)
UROBILINOGEN UR QL STRIP.AUTO: 2 EU/DL (ref 0.2–1)
VENTRICULAR RATE, ECG03: 92 BPM
VENTRICULAR RATE, ECG03: 96 BPM
WBC # BLD AUTO: 4.4 K/UL (ref 4.3–11.1)
WBC # BLD AUTO: 4.5 K/UL (ref 4.3–11.1)
WBC # BLD AUTO: 5.1 K/UL (ref 4.3–11.1)
WBC # BLD AUTO: 5.1 K/UL (ref 4.3–11.1)
WBC # BLD AUTO: 5.2 K/UL (ref 4.3–11.1)
WBC # BLD AUTO: 5.3 K/UL (ref 4.3–11.1)
WBC # BLD AUTO: 5.6 K/UL (ref 4.3–11.1)
WBC # BLD AUTO: 5.7 K/UL (ref 4.3–11.1)
WBC # BLD AUTO: 9.3 K/UL (ref 4.3–11.1)

## 2017-01-01 PROCEDURE — 77030032490 HC SLV COMPR SCD KNE COVD -B: Performed by: UROLOGY

## 2017-01-01 PROCEDURE — HOSPICE MEDICATION HC HH HOSPICE MEDICATION

## 2017-01-01 PROCEDURE — G0299 HHS/HOSPICE OF RN EA 15 MIN: HCPCS

## 2017-01-01 PROCEDURE — 65660000000 HC RM CCU STEPDOWN

## 2017-01-01 PROCEDURE — 77030020782 HC GWN BAIR PAWS FLX 3M -B: Performed by: ANESTHESIOLOGY

## 2017-01-01 PROCEDURE — 83615 LACTATE (LD) (LDH) ENZYME: CPT | Performed by: INTERNAL MEDICINE

## 2017-01-01 PROCEDURE — 0W9B3ZX DRAINAGE OF LEFT PLEURAL CAVITY, PERCUTANEOUS APPROACH, DIAGNOSTIC: ICD-10-PCS | Performed by: INTERNAL MEDICINE

## 2017-01-01 PROCEDURE — 74011000250 HC RX REV CODE- 250: Performed by: INTERNAL MEDICINE

## 2017-01-01 PROCEDURE — 77030005553 HC CATH URETH FOL52 BARD -A: Performed by: ANESTHESIOLOGY

## 2017-01-01 PROCEDURE — 94640 AIRWAY INHALATION TREATMENT: CPT

## 2017-01-01 PROCEDURE — 74011250636 HC RX REV CODE- 250/636: Performed by: EMERGENCY MEDICINE

## 2017-01-01 PROCEDURE — 77030019605

## 2017-01-01 PROCEDURE — 83735 ASSAY OF MAGNESIUM: CPT | Performed by: NURSE PRACTITIONER

## 2017-01-01 PROCEDURE — 74011250637 HC RX REV CODE- 250/637: Performed by: INTERNAL MEDICINE

## 2017-01-01 PROCEDURE — 85025 COMPLETE CBC W/AUTO DIFF WBC: CPT | Performed by: INTERNAL MEDICINE

## 2017-01-01 PROCEDURE — 94760 N-INVAS EAR/PLS OXIMETRY 1: CPT

## 2017-01-01 PROCEDURE — 74011000250 HC RX REV CODE- 250: Performed by: UROLOGY

## 2017-01-01 PROCEDURE — 80053 COMPREHEN METABOLIC PANEL: CPT | Performed by: EMERGENCY MEDICINE

## 2017-01-01 PROCEDURE — 77030005546 HC CATH URETH FOL 3W BARD -A: Performed by: UROLOGY

## 2017-01-01 PROCEDURE — 3331090001 HH PPS REVENUE CREDIT

## 2017-01-01 PROCEDURE — 85027 COMPLETE CBC AUTOMATED: CPT | Performed by: UROLOGY

## 2017-01-01 PROCEDURE — 36415 COLL VENOUS BLD VENIPUNCTURE: CPT | Performed by: INTERNAL MEDICINE

## 2017-01-01 PROCEDURE — 93005 ELECTROCARDIOGRAM TRACING: CPT | Performed by: EMERGENCY MEDICINE

## 2017-01-01 PROCEDURE — 74011250637 HC RX REV CODE- 250/637: Performed by: NURSE PRACTITIONER

## 2017-01-01 PROCEDURE — G0156 HHCP-SVS OF AIDE,EA 15 MIN: HCPCS

## 2017-01-01 PROCEDURE — 80048 BASIC METABOLIC PNL TOTAL CA: CPT | Performed by: UROLOGY

## 2017-01-01 PROCEDURE — 99283 EMERGENCY DEPT VISIT LOW MDM: CPT | Performed by: EMERGENCY MEDICINE

## 2017-01-01 PROCEDURE — 0651 HSPC ROUTINE HOME CARE

## 2017-01-01 PROCEDURE — 77010033678 HC OXYGEN DAILY

## 2017-01-01 PROCEDURE — 99222 1ST HOSP IP/OBS MODERATE 55: CPT | Performed by: INTERNAL MEDICINE

## 2017-01-01 PROCEDURE — 74011250636 HC RX REV CODE- 250/636: Performed by: UROLOGY

## 2017-01-01 PROCEDURE — 77030018836 HC SOL IRR NACL ICUM -A

## 2017-01-01 PROCEDURE — 76040000019: Performed by: INTERNAL MEDICINE

## 2017-01-01 PROCEDURE — 99232 SBSQ HOSP IP/OBS MODERATE 35: CPT | Performed by: INTERNAL MEDICINE

## 2017-01-01 PROCEDURE — 80048 BASIC METABOLIC PNL TOTAL CA: CPT | Performed by: INTERNAL MEDICINE

## 2017-01-01 PROCEDURE — 99284 EMERGENCY DEPT VISIT MOD MDM: CPT | Performed by: EMERGENCY MEDICINE

## 2017-01-01 PROCEDURE — 74011250636 HC RX REV CODE- 250/636: Performed by: NURSE PRACTITIONER

## 2017-01-01 PROCEDURE — 74011000258 HC RX REV CODE- 258: Performed by: NURSE PRACTITIONER

## 2017-01-01 PROCEDURE — 85025 COMPLETE CBC W/AUTO DIFF WBC: CPT | Performed by: STUDENT IN AN ORGANIZED HEALTH CARE EDUCATION/TRAINING PROGRAM

## 2017-01-01 PROCEDURE — 83735 ASSAY OF MAGNESIUM: CPT | Performed by: INTERNAL MEDICINE

## 2017-01-01 PROCEDURE — 74011000302 HC RX REV CODE- 302: Performed by: NURSE PRACTITIONER

## 2017-01-01 PROCEDURE — 97162 PT EVAL MOD COMPLEX 30 MIN: CPT

## 2017-01-01 PROCEDURE — 36415 COLL VENOUS BLD VENIPUNCTURE: CPT | Performed by: NURSE PRACTITIONER

## 2017-01-01 PROCEDURE — 74011250637 HC RX REV CODE- 250/637: Performed by: UROLOGY

## 2017-01-01 PROCEDURE — 3331090002 HH PPS REVENUE DEBIT

## 2017-01-01 PROCEDURE — 81003 URINALYSIS AUTO W/O SCOPE: CPT | Performed by: INTERNAL MEDICINE

## 2017-01-01 PROCEDURE — 83880 ASSAY OF NATRIURETIC PEPTIDE: CPT | Performed by: INTERNAL MEDICINE

## 2017-01-01 PROCEDURE — 99231 SBSQ HOSP IP/OBS SF/LOW 25: CPT | Performed by: INTERNAL MEDICINE

## 2017-01-01 PROCEDURE — 85025 COMPLETE CBC W/AUTO DIFF WBC: CPT | Performed by: EMERGENCY MEDICINE

## 2017-01-01 PROCEDURE — 76060000033 HC ANESTHESIA 1 TO 1.5 HR: Performed by: UROLOGY

## 2017-01-01 PROCEDURE — 71020 XR CHEST PA LAT: CPT

## 2017-01-01 PROCEDURE — 97110 THERAPEUTIC EXERCISES: CPT

## 2017-01-01 PROCEDURE — 0T9B70Z DRAINAGE OF BLADDER WITH DRAINAGE DEVICE, VIA NATURAL OR ARTIFICIAL OPENING: ICD-10-PCS | Performed by: UROLOGY

## 2017-01-01 PROCEDURE — 77030020120 HC VLV RESP PEP HI -B

## 2017-01-01 PROCEDURE — 84484 ASSAY OF TROPONIN QUANT: CPT

## 2017-01-01 PROCEDURE — 99218 HC RM OBSERVATION: CPT

## 2017-01-01 PROCEDURE — 77030018836 HC SOL IRR NACL ICUM -A: Performed by: UROLOGY

## 2017-01-01 PROCEDURE — 65270000029 HC RM PRIVATE

## 2017-01-01 PROCEDURE — 81001 URINALYSIS AUTO W/SCOPE: CPT | Performed by: EMERGENCY MEDICINE

## 2017-01-01 PROCEDURE — 97530 THERAPEUTIC ACTIVITIES: CPT

## 2017-01-01 PROCEDURE — 84484 ASSAY OF TROPONIN QUANT: CPT | Performed by: EMERGENCY MEDICINE

## 2017-01-01 PROCEDURE — 77030010545: Performed by: UROLOGY

## 2017-01-01 PROCEDURE — 71010 XR CHEST PORT: CPT

## 2017-01-01 PROCEDURE — 77030034849

## 2017-01-01 PROCEDURE — 74011000250 HC RX REV CODE- 250: Performed by: NURSE PRACTITIONER

## 2017-01-01 PROCEDURE — 99285 EMERGENCY DEPT VISIT HI MDM: CPT | Performed by: EMERGENCY MEDICINE

## 2017-01-01 PROCEDURE — 80048 BASIC METABOLIC PNL TOTAL CA: CPT | Performed by: NURSE PRACTITIONER

## 2017-01-01 PROCEDURE — 85027 COMPLETE CBC AUTOMATED: CPT | Performed by: NURSE PRACTITIONER

## 2017-01-01 PROCEDURE — 74011250636 HC RX REV CODE- 250/636: Performed by: ANESTHESIOLOGY

## 2017-01-01 PROCEDURE — 81003 URINALYSIS AUTO W/O SCOPE: CPT | Performed by: UROLOGY

## 2017-01-01 PROCEDURE — 32555 ASPIRATE PLEURA W/ IMAGING: CPT | Performed by: INTERNAL MEDICINE

## 2017-01-01 PROCEDURE — 84157 ASSAY OF PROTEIN OTHER: CPT | Performed by: INTERNAL MEDICINE

## 2017-01-01 PROCEDURE — 76450000000

## 2017-01-01 PROCEDURE — 77030031458 HC ELECTRD TUR BTTN OCOA -F: Performed by: UROLOGY

## 2017-01-01 PROCEDURE — 94664 DEMO&/EVAL PT USE INHALER: CPT

## 2017-01-01 PROCEDURE — 96374 THER/PROPH/DIAG INJ IV PUSH: CPT | Performed by: EMERGENCY MEDICINE

## 2017-01-01 PROCEDURE — 80053 COMPREHEN METABOLIC PANEL: CPT | Performed by: STUDENT IN AN ORGANIZED HEALTH CARE EDUCATION/TRAINING PROGRAM

## 2017-01-01 PROCEDURE — 74011250636 HC RX REV CODE- 250/636

## 2017-01-01 PROCEDURE — C1729 CATH, DRAINAGE: HCPCS | Performed by: INTERNAL MEDICINE

## 2017-01-01 PROCEDURE — 83880 ASSAY OF NATRIURETIC PEPTIDE: CPT | Performed by: STUDENT IN AN ORGANIZED HEALTH CARE EDUCATION/TRAINING PROGRAM

## 2017-01-01 PROCEDURE — 3336500001 HSPC ELECTION

## 2017-01-01 PROCEDURE — 76210000001 HC OR PH I REC 2.5 TO 3 HR: Performed by: UROLOGY

## 2017-01-01 PROCEDURE — 86900 BLOOD TYPING SEROLOGIC ABO: CPT | Performed by: ANESTHESIOLOGY

## 2017-01-01 PROCEDURE — G0155 HHCP-SVS OF CSW,EA 15 MIN: HCPCS

## 2017-01-01 PROCEDURE — 77030003665 HC NDL SPN BBMI -A: Performed by: ANESTHESIOLOGY

## 2017-01-01 PROCEDURE — 97166 OT EVAL MOD COMPLEX 45 MIN: CPT

## 2017-01-01 PROCEDURE — 77030011256 HC DRSG MEPILEX <16IN NO BORD MOLN -A

## 2017-01-01 PROCEDURE — 74011250636 HC RX REV CODE- 250/636: Performed by: INTERNAL MEDICINE

## 2017-01-01 PROCEDURE — 96361 HYDRATE IV INFUSION ADD-ON: CPT | Performed by: EMERGENCY MEDICINE

## 2017-01-01 PROCEDURE — 86580 TB INTRADERMAL TEST: CPT | Performed by: INTERNAL MEDICINE

## 2017-01-01 PROCEDURE — 3331090003 HH PPS REVENUE ADJ

## 2017-01-01 PROCEDURE — 77030013131 HC IV BLD ST ICUM -A

## 2017-01-01 PROCEDURE — 82945 GLUCOSE OTHER FLUID: CPT | Performed by: INTERNAL MEDICINE

## 2017-01-01 PROCEDURE — 77030011943

## 2017-01-01 PROCEDURE — 51798 US URINE CAPACITY MEASURE: CPT

## 2017-01-01 PROCEDURE — 0TPBX0Z REMOVAL OF DRAINAGE DEVICE FROM BLADDER, EXTERNAL APPROACH: ICD-10-PCS | Performed by: UROLOGY

## 2017-01-01 PROCEDURE — 83880 ASSAY OF NATRIURETIC PEPTIDE: CPT | Performed by: EMERGENCY MEDICINE

## 2017-01-01 PROCEDURE — 99223 1ST HOSP IP/OBS HIGH 75: CPT | Performed by: INTERNAL MEDICINE

## 2017-01-01 PROCEDURE — 97116 GAIT TRAINING THERAPY: CPT

## 2017-01-01 PROCEDURE — 74011250637 HC RX REV CODE- 250/637: Performed by: ANESTHESIOLOGY

## 2017-01-01 PROCEDURE — 74011000302 HC RX REV CODE- 302: Performed by: INTERNAL MEDICINE

## 2017-01-01 PROCEDURE — 76010000149 HC OR TIME 1 TO 1.5 HR: Performed by: UROLOGY

## 2017-01-01 PROCEDURE — 74011250637 HC RX REV CODE- 250/637: Performed by: EMERGENCY MEDICINE

## 2017-01-01 PROCEDURE — 77030010545

## 2017-01-01 PROCEDURE — 89050 BODY FLUID CELL COUNT: CPT | Performed by: INTERNAL MEDICINE

## 2017-01-01 PROCEDURE — 86580 TB INTRADERMAL TEST: CPT | Performed by: NURSE PRACTITIONER

## 2017-01-01 PROCEDURE — 77030019927 HC TBNG IRR CYSTO BAXT -A: Performed by: UROLOGY

## 2017-01-01 PROCEDURE — 77030007880 HC KT SPN EPDRL BBMI -B: Performed by: ANESTHESIOLOGY

## 2017-01-01 PROCEDURE — 87086 URINE CULTURE/COLONY COUNT: CPT | Performed by: UROLOGY

## 2017-01-01 PROCEDURE — 0V508ZZ DESTRUCTION OF PROSTATE, VIA NATURAL OR ARTIFICIAL OPENING ENDOSCOPIC: ICD-10-PCS | Performed by: UROLOGY

## 2017-01-01 PROCEDURE — 74011000250 HC RX REV CODE- 250

## 2017-01-01 PROCEDURE — C8929 TTE W OR WO FOL WCON,DOPPLER: HCPCS

## 2017-01-01 PROCEDURE — 87086 URINE CULTURE/COLONY COUNT: CPT | Performed by: INTERNAL MEDICINE

## 2017-01-01 PROCEDURE — 97161 PT EVAL LOW COMPLEX 20 MIN: CPT

## 2017-01-01 RX ORDER — ALBUTEROL SULFATE 2.5 MG/.5ML
2.5 SOLUTION RESPIRATORY (INHALATION)
Status: DISCONTINUED | OUTPATIENT
Start: 2017-01-01 | End: 2017-01-01 | Stop reason: HOSPADM

## 2017-01-01 RX ORDER — GUAIFENESIN 600 MG/1
1200 TABLET, EXTENDED RELEASE ORAL EVERY 12 HOURS
Status: DISCONTINUED | OUTPATIENT
Start: 2017-01-01 | End: 2017-01-01 | Stop reason: HOSPADM

## 2017-01-01 RX ORDER — HYDROCODONE BITARTRATE AND ACETAMINOPHEN 5; 325 MG/1; MG/1
1 TABLET ORAL
Qty: 20 TAB | Refills: 0 | Status: SHIPPED | OUTPATIENT
Start: 2017-01-01 | End: 2017-01-01

## 2017-01-01 RX ORDER — POTASSIUM CHLORIDE 1.5 G/1.77G
20 POWDER, FOR SOLUTION ORAL 2 TIMES DAILY
Status: DISCONTINUED | OUTPATIENT
Start: 2017-01-01 | End: 2017-01-01 | Stop reason: HOSPADM

## 2017-01-01 RX ORDER — GUAIFENESIN 100 MG/5ML
81 LIQUID (ML) ORAL DAILY
Status: DISCONTINUED | OUTPATIENT
Start: 2017-01-01 | End: 2017-01-01 | Stop reason: HOSPADM

## 2017-01-01 RX ORDER — POTASSIUM CHLORIDE 20 MEQ/1
40 TABLET, EXTENDED RELEASE ORAL 2 TIMES DAILY
Status: COMPLETED | OUTPATIENT
Start: 2017-01-01 | End: 2017-01-01

## 2017-01-01 RX ORDER — CEFAZOLIN SODIUM IN 0.9 % NACL 2 G/50 ML
2 INTRAVENOUS SOLUTION, PIGGYBACK (ML) INTRAVENOUS EVERY 8 HOURS
Status: COMPLETED | OUTPATIENT
Start: 2017-01-01 | End: 2017-01-01

## 2017-01-01 RX ORDER — ALBUTEROL SULFATE 2.5 MG/.5ML
2.5 SOLUTION RESPIRATORY (INHALATION)
Status: DISCONTINUED | OUTPATIENT
Start: 2017-01-01 | End: 2017-01-01

## 2017-01-01 RX ORDER — RISPERIDONE 4 MG/1
4 TABLET, FILM COATED ORAL
COMMUNITY
Start: 2017-01-01 | End: 2018-01-01 | Stop reason: DRUGHIGH

## 2017-01-01 RX ORDER — FUROSEMIDE 10 MG/ML
40 INJECTION INTRAMUSCULAR; INTRAVENOUS
Status: COMPLETED | OUTPATIENT
Start: 2017-01-01 | End: 2017-01-01

## 2017-01-01 RX ORDER — LIDOCAINE HYDROCHLORIDE 20 MG/ML
JELLY TOPICAL ONCE
Status: COMPLETED | OUTPATIENT
Start: 2017-01-01 | End: 2017-01-01

## 2017-01-01 RX ORDER — ALBUTEROL SULFATE 90 UG/1
1 AEROSOL, METERED RESPIRATORY (INHALATION)
Status: DISCONTINUED | OUTPATIENT
Start: 2017-01-01 | End: 2017-01-01 | Stop reason: ALTCHOICE

## 2017-01-01 RX ORDER — RISPERIDONE 1 MG/1
4 TABLET, FILM COATED ORAL
Status: DISCONTINUED | OUTPATIENT
Start: 2017-01-01 | End: 2017-01-01 | Stop reason: HOSPADM

## 2017-01-01 RX ORDER — SODIUM CHLORIDE, SODIUM LACTATE, POTASSIUM CHLORIDE, CALCIUM CHLORIDE 600; 310; 30; 20 MG/100ML; MG/100ML; MG/100ML; MG/100ML
75 INJECTION, SOLUTION INTRAVENOUS CONTINUOUS
Status: DISCONTINUED | OUTPATIENT
Start: 2017-01-01 | End: 2017-01-01 | Stop reason: HOSPADM

## 2017-01-01 RX ORDER — SODIUM CHLORIDE 0.9 % (FLUSH) 0.9 %
5-10 SYRINGE (ML) INJECTION AS NEEDED
Status: DISCONTINUED | OUTPATIENT
Start: 2017-01-01 | End: 2017-01-01 | Stop reason: HOSPADM

## 2017-01-01 RX ORDER — TAMSULOSIN HYDROCHLORIDE 0.4 MG/1
0.4 CAPSULE ORAL DAILY
Status: DISCONTINUED | OUTPATIENT
Start: 2017-01-01 | End: 2017-01-01 | Stop reason: HOSPADM

## 2017-01-01 RX ORDER — HYDROMORPHONE HYDROCHLORIDE 1 MG/ML
0.5 INJECTION, SOLUTION INTRAMUSCULAR; INTRAVENOUS; SUBCUTANEOUS
Status: DISCONTINUED | OUTPATIENT
Start: 2017-01-01 | End: 2017-01-01

## 2017-01-01 RX ORDER — PROPOFOL 10 MG/ML
INJECTION, EMULSION INTRAVENOUS
Status: DISCONTINUED | OUTPATIENT
Start: 2017-01-01 | End: 2017-01-01 | Stop reason: HOSPADM

## 2017-01-01 RX ORDER — BUDESONIDE 0.5 MG/2ML
500 INHALANT ORAL
Status: DISCONTINUED | OUTPATIENT
Start: 2017-01-01 | End: 2017-01-01 | Stop reason: HOSPADM

## 2017-01-01 RX ORDER — PROPOFOL 10 MG/ML
INJECTION, EMULSION INTRAVENOUS AS NEEDED
Status: DISCONTINUED | OUTPATIENT
Start: 2017-01-01 | End: 2017-01-01 | Stop reason: HOSPADM

## 2017-01-01 RX ORDER — DIPHENHYDRAMINE HCL 25 MG
25 CAPSULE ORAL
Status: DISCONTINUED | OUTPATIENT
Start: 2017-01-01 | End: 2017-01-01 | Stop reason: HOSPADM

## 2017-01-01 RX ORDER — DIPHENHYDRAMINE HCL 25 MG
25 CAPSULE ORAL
Status: DISCONTINUED | OUTPATIENT
Start: 2017-01-01 | End: 2017-01-01

## 2017-01-01 RX ORDER — PANTOPRAZOLE SODIUM 40 MG/1
40 TABLET, DELAYED RELEASE ORAL
Status: DISCONTINUED | OUTPATIENT
Start: 2017-01-01 | End: 2017-01-01 | Stop reason: HOSPADM

## 2017-01-01 RX ORDER — ACETAMINOPHEN 325 MG/1
650 TABLET ORAL
Status: DISCONTINUED | OUTPATIENT
Start: 2017-01-01 | End: 2017-01-01 | Stop reason: SDUPTHER

## 2017-01-01 RX ORDER — ONDANSETRON 2 MG/ML
4 INJECTION INTRAMUSCULAR; INTRAVENOUS
Status: DISCONTINUED | OUTPATIENT
Start: 2017-01-01 | End: 2017-01-01 | Stop reason: HOSPADM

## 2017-01-01 RX ORDER — AMIODARONE HYDROCHLORIDE 200 MG/1
200 TABLET ORAL 2 TIMES DAILY
Qty: 60 TAB | Refills: 3 | Status: SHIPPED
Start: 2017-01-01 | End: 2017-01-01

## 2017-01-01 RX ORDER — METOPROLOL SUCCINATE 50 MG/1
50 TABLET, EXTENDED RELEASE ORAL DAILY
Qty: 30 TAB | Refills: 3 | Status: SHIPPED
Start: 2017-01-01 | End: 2017-01-01

## 2017-01-01 RX ORDER — HYDROMORPHONE HYDROCHLORIDE 2 MG/ML
0.5 INJECTION, SOLUTION INTRAMUSCULAR; INTRAVENOUS; SUBCUTANEOUS
Status: DISCONTINUED | OUTPATIENT
Start: 2017-01-01 | End: 2017-01-01 | Stop reason: HOSPADM

## 2017-01-01 RX ORDER — LIDOCAINE HYDROCHLORIDE 10 MG/ML
0.1 INJECTION INFILTRATION; PERINEURAL AS NEEDED
Status: DISCONTINUED | OUTPATIENT
Start: 2017-01-01 | End: 2017-01-01 | Stop reason: HOSPADM

## 2017-01-01 RX ORDER — AMIODARONE HYDROCHLORIDE 200 MG/1
200 TABLET ORAL EVERY 12 HOURS
Status: DISCONTINUED | OUTPATIENT
Start: 2017-01-01 | End: 2017-01-01 | Stop reason: HOSPADM

## 2017-01-01 RX ORDER — POTASSIUM CHLORIDE 1.5 G/1.77G
20 POWDER, FOR SOLUTION ORAL DAILY
Status: DISCONTINUED | OUTPATIENT
Start: 2017-01-01 | End: 2017-01-01 | Stop reason: HOSPADM

## 2017-01-01 RX ORDER — ASCORBIC ACID 500 MG
500 TABLET ORAL DAILY
Status: DISCONTINUED | OUTPATIENT
Start: 2017-01-01 | End: 2017-01-01 | Stop reason: HOSPADM

## 2017-01-01 RX ORDER — POTASSIUM CHLORIDE 20 MEQ/1
40 TABLET, EXTENDED RELEASE ORAL
Status: COMPLETED | OUTPATIENT
Start: 2017-01-01 | End: 2017-01-01

## 2017-01-01 RX ORDER — ADHESIVE BANDAGE
30 BANDAGE TOPICAL
Status: SHIPPED | COMMUNITY
Start: 2017-01-01 | End: 2017-01-01 | Stop reason: ALTCHOICE

## 2017-01-01 RX ORDER — MELATONIN
1000 DAILY
Status: DISCONTINUED | OUTPATIENT
Start: 2017-01-01 | End: 2017-01-01 | Stop reason: HOSPADM

## 2017-01-01 RX ORDER — FUROSEMIDE 40 MG/1
80 TABLET ORAL 2 TIMES DAILY
Status: DISCONTINUED | OUTPATIENT
Start: 2017-01-01 | End: 2017-01-01 | Stop reason: HOSPADM

## 2017-01-01 RX ORDER — ACETAMINOPHEN 325 MG/1
650 TABLET ORAL
Status: DISCONTINUED | OUTPATIENT
Start: 2017-01-01 | End: 2017-01-01

## 2017-01-01 RX ORDER — SODIUM BICARBONATE 650 MG/1
325 TABLET ORAL
Status: DISCONTINUED | OUTPATIENT
Start: 2017-01-01 | End: 2017-01-01

## 2017-01-01 RX ORDER — SIMVASTATIN 20 MG/1
20 TABLET, FILM COATED ORAL
Status: DISCONTINUED | OUTPATIENT
Start: 2017-01-01 | End: 2017-01-01 | Stop reason: HOSPADM

## 2017-01-01 RX ORDER — METOPROLOL SUCCINATE 50 MG/1
50 TABLET, EXTENDED RELEASE ORAL EVERY 12 HOURS
Status: DISCONTINUED | OUTPATIENT
Start: 2017-01-01 | End: 2017-01-01 | Stop reason: HOSPADM

## 2017-01-01 RX ORDER — METOPROLOL SUCCINATE 50 MG/1
50 TABLET, EXTENDED RELEASE ORAL DAILY
Status: DISCONTINUED | OUTPATIENT
Start: 2017-01-01 | End: 2017-01-01 | Stop reason: HOSPADM

## 2017-01-01 RX ORDER — ADHESIVE BANDAGE
30 BANDAGE TOPICAL DAILY PRN
Status: DISCONTINUED | OUTPATIENT
Start: 2017-01-01 | End: 2017-01-01 | Stop reason: HOSPADM

## 2017-01-01 RX ORDER — FUROSEMIDE 10 MG/ML
40 INJECTION INTRAMUSCULAR; INTRAVENOUS 2 TIMES DAILY
Status: DISCONTINUED | OUTPATIENT
Start: 2017-01-01 | End: 2017-01-01

## 2017-01-01 RX ORDER — ALBUTEROL SULFATE 0.83 MG/ML
2.5 SOLUTION RESPIRATORY (INHALATION)
Status: DISCONTINUED | OUTPATIENT
Start: 2017-01-01 | End: 2017-01-01 | Stop reason: HOSPADM

## 2017-01-01 RX ORDER — FUROSEMIDE 80 MG/1
80 TABLET ORAL 2 TIMES DAILY
Qty: 60 TAB | Refills: 11 | Status: SHIPPED | OUTPATIENT
Start: 2017-01-01 | End: 2017-01-01 | Stop reason: SDUPTHER

## 2017-01-01 RX ORDER — FUROSEMIDE 10 MG/ML
60 INJECTION INTRAMUSCULAR; INTRAVENOUS
Status: COMPLETED | OUTPATIENT
Start: 2017-01-01 | End: 2017-01-01

## 2017-01-01 RX ORDER — BUDESONIDE 0.5 MG/2ML
500 INHALANT ORAL
Status: DISCONTINUED | OUTPATIENT
Start: 2017-01-01 | End: 2017-01-01

## 2017-01-01 RX ORDER — TAMSULOSIN HYDROCHLORIDE 0.4 MG/1
0.4 CAPSULE ORAL
Status: DISCONTINUED | OUTPATIENT
Start: 2017-01-01 | End: 2017-01-01 | Stop reason: HOSPADM

## 2017-01-01 RX ORDER — CIPROFLOXACIN 2 MG/ML
400 INJECTION, SOLUTION INTRAVENOUS ONCE
Status: COMPLETED | OUTPATIENT
Start: 2017-01-01 | End: 2017-01-01

## 2017-01-01 RX ORDER — KETOROLAC TROMETHAMINE 30 MG/ML
15 INJECTION, SOLUTION INTRAMUSCULAR; INTRAVENOUS
Status: COMPLETED | OUTPATIENT
Start: 2017-01-01 | End: 2017-01-01

## 2017-01-01 RX ORDER — ACETAMINOPHEN 325 MG/1
650 TABLET ORAL
Status: DISCONTINUED | OUTPATIENT
Start: 2017-01-01 | End: 2017-01-01 | Stop reason: HOSPADM

## 2017-01-01 RX ORDER — DOXAZOSIN 1 MG/1
1 TABLET ORAL
Status: DISCONTINUED | OUTPATIENT
Start: 2017-01-01 | End: 2017-01-01 | Stop reason: HOSPADM

## 2017-01-01 RX ORDER — HYDROCODONE BITARTRATE AND ACETAMINOPHEN 5; 325 MG/1; MG/1
1 TABLET ORAL
Status: DISCONTINUED | OUTPATIENT
Start: 2017-01-01 | End: 2017-01-01

## 2017-01-01 RX ORDER — NITROFURANTOIN (MACROCRYSTALS) 100 MG/1
100 CAPSULE ORAL 2 TIMES DAILY
Qty: 6 CAP | Refills: 0 | Status: SHIPPED | OUTPATIENT
Start: 2017-01-01 | End: 2017-01-01

## 2017-01-01 RX ORDER — FUROSEMIDE 40 MG/1
80 TABLET ORAL
Status: DISCONTINUED | OUTPATIENT
Start: 2017-01-01 | End: 2017-01-01

## 2017-01-01 RX ORDER — FAMOTIDINE 20 MG/1
20 TABLET, FILM COATED ORAL ONCE
Status: COMPLETED | OUTPATIENT
Start: 2017-01-01 | End: 2017-01-01

## 2017-01-01 RX ORDER — FUROSEMIDE 10 MG/ML
60 INJECTION INTRAMUSCULAR; INTRAVENOUS EVERY 12 HOURS
Status: DISCONTINUED | OUTPATIENT
Start: 2017-01-01 | End: 2017-01-01

## 2017-01-01 RX ORDER — LISINOPRIL 20 MG/1
40 TABLET ORAL DAILY
Status: DISCONTINUED | OUTPATIENT
Start: 2017-01-01 | End: 2017-01-01 | Stop reason: HOSPADM

## 2017-01-01 RX ORDER — ACETAMINOPHEN 325 MG/1
2 TABLET ORAL
Status: SHIPPED | COMMUNITY
Start: 2017-01-01

## 2017-01-01 RX ORDER — OXYCODONE AND ACETAMINOPHEN 5; 325 MG/1; MG/1
1 TABLET ORAL AS NEEDED
Status: DISCONTINUED | OUTPATIENT
Start: 2017-01-01 | End: 2017-01-01 | Stop reason: HOSPADM

## 2017-01-01 RX ORDER — SODIUM CHLORIDE 0.9 % (FLUSH) 0.9 %
5-10 SYRINGE (ML) INJECTION EVERY 8 HOURS
Status: DISCONTINUED | OUTPATIENT
Start: 2017-01-01 | End: 2017-01-01 | Stop reason: HOSPADM

## 2017-01-01 RX ORDER — FUROSEMIDE 40 MG/1
40 TABLET ORAL
Status: DISCONTINUED | OUTPATIENT
Start: 2017-01-01 | End: 2017-01-01

## 2017-01-01 RX ORDER — FUROSEMIDE 40 MG/1
80 TABLET ORAL EVERY 12 HOURS
Status: DISCONTINUED | OUTPATIENT
Start: 2017-01-01 | End: 2017-01-01 | Stop reason: HOSPADM

## 2017-01-01 RX ORDER — RISPERIDONE 1 MG/1
3 TABLET, FILM COATED ORAL
Status: DISCONTINUED | OUTPATIENT
Start: 2017-01-01 | End: 2017-01-01 | Stop reason: HOSPADM

## 2017-01-01 RX ORDER — NALOXONE HYDROCHLORIDE 0.4 MG/ML
0.2 INJECTION, SOLUTION INTRAMUSCULAR; INTRAVENOUS; SUBCUTANEOUS AS NEEDED
Status: DISCONTINUED | OUTPATIENT
Start: 2017-01-01 | End: 2017-01-01 | Stop reason: HOSPADM

## 2017-01-01 RX ORDER — ALBUTEROL SULFATE 90 UG/1
1 AEROSOL, METERED RESPIRATORY (INHALATION)
Status: DISCONTINUED | OUTPATIENT
Start: 2017-01-01 | End: 2017-01-01 | Stop reason: HOSPADM

## 2017-01-01 RX ORDER — MONTELUKAST SODIUM 10 MG/1
10 TABLET ORAL
Status: DISCONTINUED | OUTPATIENT
Start: 2017-01-01 | End: 2017-01-01 | Stop reason: HOSPADM

## 2017-01-01 RX ORDER — DOCUSATE SODIUM 100 MG/1
100 CAPSULE, LIQUID FILLED ORAL 2 TIMES DAILY
Status: DISCONTINUED | OUTPATIENT
Start: 2017-01-01 | End: 2017-01-01 | Stop reason: HOSPADM

## 2017-01-01 RX ORDER — ALBUTEROL SULFATE 0.83 MG/ML
2.5 SOLUTION RESPIRATORY (INHALATION)
Status: DISCONTINUED | OUTPATIENT
Start: 2017-01-01 | End: 2017-01-01 | Stop reason: SDUPTHER

## 2017-01-01 RX ORDER — SODIUM CHLORIDE 9 MG/ML
75 INJECTION, SOLUTION INTRAVENOUS CONTINUOUS
Status: DISCONTINUED | OUTPATIENT
Start: 2017-01-01 | End: 2017-01-01

## 2017-01-01 RX ADMIN — FUROSEMIDE 10 MG/HR: 10 INJECTION, SOLUTION INTRAMUSCULAR; INTRAVENOUS at 21:43

## 2017-01-01 RX ADMIN — DIPHENHYDRAMINE HYDROCHLORIDE 25 MG: 25 CAPSULE ORAL at 01:40

## 2017-01-01 RX ADMIN — TAMSULOSIN HYDROCHLORIDE 0.4 MG: 0.4 CAPSULE ORAL at 21:38

## 2017-01-01 RX ADMIN — Medication 500 MG: at 09:10

## 2017-01-01 RX ADMIN — SIMVASTATIN 20 MG: 20 TABLET, FILM COATED ORAL at 21:44

## 2017-01-01 RX ADMIN — TUBERCULIN PURIFIED PROTEIN DERIVATIVE 5 UNITS: 5 INJECTION INTRADERMAL at 00:00

## 2017-01-01 RX ADMIN — BUDESONIDE 500 MCG: 0.5 INHALANT RESPIRATORY (INHALATION) at 19:22

## 2017-01-01 RX ADMIN — CEFTRIAXONE 1 G: 1 INJECTION, POWDER, FOR SOLUTION INTRAMUSCULAR; INTRAVENOUS at 00:38

## 2017-01-01 RX ADMIN — METOPROLOL SUCCINATE 50 MG: 50 TABLET, EXTENDED RELEASE ORAL at 22:05

## 2017-01-01 RX ADMIN — PANTOPRAZOLE SODIUM 40 MG: 40 TABLET, DELAYED RELEASE ORAL at 05:51

## 2017-01-01 RX ADMIN — SIMVASTATIN 20 MG: 20 TABLET, FILM COATED ORAL at 22:05

## 2017-01-01 RX ADMIN — FUROSEMIDE 40 MG: 40 TABLET ORAL at 05:33

## 2017-01-01 RX ADMIN — CEFTRIAXONE 1 G: 1 INJECTION, POWDER, FOR SOLUTION INTRAMUSCULAR; INTRAVENOUS at 00:22

## 2017-01-01 RX ADMIN — ALBUTEROL SULFATE 2.5 MG: 2.5 SOLUTION RESPIRATORY (INHALATION) at 02:20

## 2017-01-01 RX ADMIN — FUROSEMIDE 80 MG: 40 TABLET ORAL at 17:33

## 2017-01-01 RX ADMIN — FUROSEMIDE 80 MG: 40 TABLET ORAL at 09:21

## 2017-01-01 RX ADMIN — ALBUTEROL SULFATE 2.5 MG: 2.5 SOLUTION RESPIRATORY (INHALATION) at 08:16

## 2017-01-01 RX ADMIN — AMIODARONE HYDROCHLORIDE 200 MG: 200 TABLET ORAL at 20:49

## 2017-01-01 RX ADMIN — POTASSIUM CHLORIDE 20 MEQ: 1.5 POWDER, FOR SOLUTION ORAL at 09:02

## 2017-01-01 RX ADMIN — FUROSEMIDE 80 MG: 40 TABLET ORAL at 09:16

## 2017-01-01 RX ADMIN — FUROSEMIDE 80 MG: 40 TABLET ORAL at 08:13

## 2017-01-01 RX ADMIN — LISINOPRIL 40 MG: 20 TABLET ORAL at 09:15

## 2017-01-01 RX ADMIN — Medication 10 ML: at 17:50

## 2017-01-01 RX ADMIN — AMIODARONE HYDROCHLORIDE 200 MG: 200 TABLET ORAL at 09:03

## 2017-01-01 RX ADMIN — ALBUTEROL SULFATE 2.5 MG: 2.5 SOLUTION RESPIRATORY (INHALATION) at 14:02

## 2017-01-01 RX ADMIN — ALBUTEROL SULFATE 2.5 MG: 2.5 SOLUTION RESPIRATORY (INHALATION) at 08:40

## 2017-01-01 RX ADMIN — MAGNESIUM HYDROXIDE 30 ML: 400 SUSPENSION ORAL at 14:05

## 2017-01-01 RX ADMIN — ASPIRIN 81 MG: 81 TABLET, CHEWABLE ORAL at 09:59

## 2017-01-01 RX ADMIN — ALBUTEROL SULFATE 2.5 MG: 2.5 SOLUTION RESPIRATORY (INHALATION) at 13:46

## 2017-01-01 RX ADMIN — METOPROLOL SUCCINATE 50 MG: 50 TABLET, EXTENDED RELEASE ORAL at 21:13

## 2017-01-01 RX ADMIN — LISINOPRIL 40 MG: 20 TABLET ORAL at 08:05

## 2017-01-01 RX ADMIN — AMIODARONE HYDROCHLORIDE 200 MG: 200 TABLET ORAL at 08:27

## 2017-01-01 RX ADMIN — BUDESONIDE 500 MCG: 0.5 INHALANT RESPIRATORY (INHALATION) at 19:24

## 2017-01-01 RX ADMIN — TAMSULOSIN HYDROCHLORIDE 0.4 MG: 0.4 CAPSULE ORAL at 09:10

## 2017-01-01 RX ADMIN — ASPIRIN 81 MG 81 MG: 81 TABLET ORAL at 09:22

## 2017-01-01 RX ADMIN — ALBUTEROL SULFATE 2.5 MG: 2.5 SOLUTION RESPIRATORY (INHALATION) at 02:12

## 2017-01-01 RX ADMIN — DOCUSATE SODIUM 100 MG: 100 CAPSULE, LIQUID FILLED ORAL at 08:27

## 2017-01-01 RX ADMIN — BUDESONIDE 500 MCG: 0.5 INHALANT RESPIRATORY (INHALATION) at 06:55

## 2017-01-01 RX ADMIN — FUROSEMIDE 10 MG/HR: 10 INJECTION, SOLUTION INTRAMUSCULAR; INTRAVENOUS at 06:17

## 2017-01-01 RX ADMIN — METOPROLOL SUCCINATE 50 MG: 50 TABLET, EXTENDED RELEASE ORAL at 08:00

## 2017-01-01 RX ADMIN — POTASSIUM CHLORIDE 20 MEQ: 1.5 POWDER, FOR SOLUTION ORAL at 09:09

## 2017-01-01 RX ADMIN — APIXABAN 2.5 MG: 2.5 TABLET, FILM COATED ORAL at 08:13

## 2017-01-01 RX ADMIN — SIMVASTATIN 20 MG: 20 TABLET, FILM COATED ORAL at 21:15

## 2017-01-01 RX ADMIN — AMIODARONE HYDROCHLORIDE 200 MG: 200 TABLET ORAL at 08:13

## 2017-01-01 RX ADMIN — FUROSEMIDE 40 MG: 40 TABLET ORAL at 05:44

## 2017-01-01 RX ADMIN — ALBUTEROL SULFATE 2.5 MG: 2.5 SOLUTION RESPIRATORY (INHALATION) at 14:50

## 2017-01-01 RX ADMIN — ASPIRIN 81 MG: 81 TABLET, CHEWABLE ORAL at 08:05

## 2017-01-01 RX ADMIN — BUDESONIDE 500 MCG: 0.5 SUSPENSION RESPIRATORY (INHALATION) at 19:31

## 2017-01-01 RX ADMIN — DIPHENHYDRAMINE HYDROCHLORIDE 25 MG: 25 CAPSULE ORAL at 21:50

## 2017-01-01 RX ADMIN — RISPERIDONE 4 MG: 1 TABLET ORAL at 22:04

## 2017-01-01 RX ADMIN — FUROSEMIDE 80 MG: 40 TABLET ORAL at 08:56

## 2017-01-01 RX ADMIN — SODIUM CHLORIDE, SODIUM LACTATE, POTASSIUM CHLORIDE, AND CALCIUM CHLORIDE 75 ML/HR: 600; 310; 30; 20 INJECTION, SOLUTION INTRAVENOUS at 13:45

## 2017-01-01 RX ADMIN — ALBUTEROL SULFATE 2.5 MG: 2.5 SOLUTION RESPIRATORY (INHALATION) at 19:38

## 2017-01-01 RX ADMIN — POTASSIUM CHLORIDE 40 MEQ: 20 TABLET, EXTENDED RELEASE ORAL at 17:26

## 2017-01-01 RX ADMIN — RISPERIDONE 4 MG: 1 TABLET ORAL at 21:50

## 2017-01-01 RX ADMIN — TAMSULOSIN HYDROCHLORIDE 0.4 MG: 0.4 CAPSULE ORAL at 21:13

## 2017-01-01 RX ADMIN — ALBUTEROL SULFATE 2.5 MG: 2.5 SOLUTION RESPIRATORY (INHALATION) at 19:31

## 2017-01-01 RX ADMIN — Medication 10 ML: at 05:51

## 2017-01-01 RX ADMIN — BUDESONIDE 500 MCG: 0.5 SUSPENSION RESPIRATORY (INHALATION) at 08:16

## 2017-01-01 RX ADMIN — BUDESONIDE 500 MCG: 0.5 INHALANT RESPIRATORY (INHALATION) at 07:30

## 2017-01-01 RX ADMIN — TAMSULOSIN HYDROCHLORIDE 0.4 MG: 0.4 CAPSULE ORAL at 09:16

## 2017-01-01 RX ADMIN — RISPERIDONE 4 MG: 1 TABLET ORAL at 21:52

## 2017-01-01 RX ADMIN — AMIODARONE HYDROCHLORIDE 200 MG: 200 TABLET ORAL at 09:16

## 2017-01-01 RX ADMIN — PERFLUTREN 1 ML: 6.52 INJECTION, SUSPENSION INTRAVENOUS at 09:00

## 2017-01-01 RX ADMIN — BUDESONIDE 500 MCG: 0.5 SUSPENSION RESPIRATORY (INHALATION) at 20:18

## 2017-01-01 RX ADMIN — BUDESONIDE 500 MCG: 0.5 INHALANT RESPIRATORY (INHALATION) at 08:34

## 2017-01-01 RX ADMIN — MONTELUKAST SODIUM 10 MG: 10 TABLET, COATED ORAL at 21:44

## 2017-01-01 RX ADMIN — ALBUTEROL SULFATE 2.5 MG: 2.5 SOLUTION RESPIRATORY (INHALATION) at 02:05

## 2017-01-01 RX ADMIN — FUROSEMIDE 80 MG: 40 TABLET ORAL at 17:10

## 2017-01-01 RX ADMIN — DOXAZOSIN MESYLATE 1 MG: 1 TABLET ORAL at 21:38

## 2017-01-01 RX ADMIN — FUROSEMIDE 80 MG: 40 TABLET ORAL at 09:10

## 2017-01-01 RX ADMIN — ASPIRIN 81 MG: 81 TABLET, CHEWABLE ORAL at 08:54

## 2017-01-01 RX ADMIN — ALBUTEROL SULFATE 2.5 MG: 2.5 SOLUTION RESPIRATORY (INHALATION) at 19:14

## 2017-01-01 RX ADMIN — AMIODARONE HYDROCHLORIDE 200 MG: 200 TABLET ORAL at 21:50

## 2017-01-01 RX ADMIN — FUROSEMIDE 80 MG: 40 TABLET ORAL at 22:03

## 2017-01-01 RX ADMIN — AMIODARONE HYDROCHLORIDE 200 MG: 200 TABLET ORAL at 22:40

## 2017-01-01 RX ADMIN — BUDESONIDE 500 MCG: 0.5 INHALANT RESPIRATORY (INHALATION) at 21:00

## 2017-01-01 RX ADMIN — TAMSULOSIN HYDROCHLORIDE 0.4 MG: 0.4 CAPSULE ORAL at 21:00

## 2017-01-01 RX ADMIN — DIPHENHYDRAMINE HYDROCHLORIDE 25 MG: 25 CAPSULE ORAL at 21:18

## 2017-01-01 RX ADMIN — ALBUTEROL SULFATE 2.5 MG: 2.5 SOLUTION RESPIRATORY (INHALATION) at 07:20

## 2017-01-01 RX ADMIN — POTASSIUM CHLORIDE 20 MEQ: 1.5 POWDER, FOR SOLUTION ORAL at 08:13

## 2017-01-01 RX ADMIN — FUROSEMIDE 40 MG: 40 TABLET ORAL at 08:19

## 2017-01-01 RX ADMIN — FUROSEMIDE 80 MG: 40 TABLET ORAL at 09:09

## 2017-01-01 RX ADMIN — DOXAZOSIN MESYLATE 1 MG: 1 TABLET ORAL at 21:15

## 2017-01-01 RX ADMIN — ASPIRIN 81 MG 81 MG: 81 TABLET ORAL at 09:15

## 2017-01-01 RX ADMIN — SODIUM CHLORIDE 1000 ML: 900 INJECTION, SOLUTION INTRAVENOUS at 01:01

## 2017-01-01 RX ADMIN — PANTOPRAZOLE SODIUM 40 MG: 40 TABLET, DELAYED RELEASE ORAL at 08:19

## 2017-01-01 RX ADMIN — AMIODARONE HYDROCHLORIDE 200 MG: 200 TABLET ORAL at 21:52

## 2017-01-01 RX ADMIN — ASPIRIN 81 MG 81 MG: 81 TABLET ORAL at 09:09

## 2017-01-01 RX ADMIN — ALBUTEROL SULFATE 2.5 MG: 2.5 SOLUTION RESPIRATORY (INHALATION) at 01:59

## 2017-01-01 RX ADMIN — ACETAMINOPHEN 650 MG: 325 TABLET, FILM COATED ORAL at 18:39

## 2017-01-01 RX ADMIN — BUDESONIDE 500 MCG: 0.5 INHALANT RESPIRATORY (INHALATION) at 07:20

## 2017-01-01 RX ADMIN — ALBUTEROL SULFATE 2.5 MG: 2.5 SOLUTION RESPIRATORY (INHALATION) at 07:32

## 2017-01-01 RX ADMIN — ALBUTEROL SULFATE 2.5 MG: 2.5 SOLUTION RESPIRATORY (INHALATION) at 21:00

## 2017-01-01 RX ADMIN — RISPERIDONE 4 MG: 1 TABLET ORAL at 20:53

## 2017-01-01 RX ADMIN — ALBUTEROL SULFATE 2.5 MG: 2.5 SOLUTION RESPIRATORY (INHALATION) at 14:04

## 2017-01-01 RX ADMIN — LISINOPRIL 40 MG: 20 TABLET ORAL at 09:59

## 2017-01-01 RX ADMIN — ALBUTEROL SULFATE 2.5 MG: 2.5 SOLUTION RESPIRATORY (INHALATION) at 07:40

## 2017-01-01 RX ADMIN — SIMVASTATIN 20 MG: 20 TABLET, FILM COATED ORAL at 22:40

## 2017-01-01 RX ADMIN — CIPROFLOXACIN 400 MG: 2 INJECTION, SOLUTION INTRAVENOUS at 15:46

## 2017-01-01 RX ADMIN — BUDESONIDE 500 MCG: 0.5 INHALANT RESPIRATORY (INHALATION) at 07:40

## 2017-01-01 RX ADMIN — DOCUSATE SODIUM 100 MG: 100 CAPSULE, LIQUID FILLED ORAL at 17:11

## 2017-01-01 RX ADMIN — POTASSIUM CHLORIDE 20 MEQ: 1.5 POWDER, FOR SOLUTION ORAL at 17:23

## 2017-01-01 RX ADMIN — APIXABAN 2.5 MG: 2.5 TABLET, FILM COATED ORAL at 09:22

## 2017-01-01 RX ADMIN — RISPERIDONE 4 MG: 1 TABLET ORAL at 21:44

## 2017-01-01 RX ADMIN — ASPIRIN 81 MG 81 MG: 81 TABLET ORAL at 08:13

## 2017-01-01 RX ADMIN — POTASSIUM CHLORIDE 40 MEQ: 20 TABLET, EXTENDED RELEASE ORAL at 12:07

## 2017-01-01 RX ADMIN — CEFTRIAXONE 1 G: 1 INJECTION, POWDER, FOR SOLUTION INTRAMUSCULAR; INTRAVENOUS at 01:08

## 2017-01-01 RX ADMIN — METOPROLOL SUCCINATE 50 MG: 50 TABLET, EXTENDED RELEASE ORAL at 09:22

## 2017-01-01 RX ADMIN — SODIUM CHLORIDE, SODIUM LACTATE, POTASSIUM CHLORIDE, AND CALCIUM CHLORIDE: 600; 310; 30; 20 INJECTION, SOLUTION INTRAVENOUS at 16:50

## 2017-01-01 RX ADMIN — DOCUSATE SODIUM 100 MG: 100 CAPSULE, LIQUID FILLED ORAL at 09:11

## 2017-01-01 RX ADMIN — ALBUTEROL SULFATE 2.5 MG: 2.5 SOLUTION RESPIRATORY (INHALATION) at 07:08

## 2017-01-01 RX ADMIN — BUDESONIDE 500 MCG: 0.5 INHALANT RESPIRATORY (INHALATION) at 19:38

## 2017-01-01 RX ADMIN — RISPERIDONE 3 MG: 1 TABLET ORAL at 21:26

## 2017-01-01 RX ADMIN — BUDESONIDE 500 MCG: 0.5 INHALANT RESPIRATORY (INHALATION) at 07:07

## 2017-01-01 RX ADMIN — FUROSEMIDE 40 MG: 10 INJECTION, SOLUTION INTRAMUSCULAR; INTRAVENOUS at 17:05

## 2017-01-01 RX ADMIN — PANTOPRAZOLE SODIUM 40 MG: 40 TABLET, DELAYED RELEASE ORAL at 07:39

## 2017-01-01 RX ADMIN — Medication 500 MG: at 08:28

## 2017-01-01 RX ADMIN — CEFAZOLIN 2 G: 1 INJECTION, POWDER, FOR SOLUTION INTRAMUSCULAR; INTRAVENOUS; PARENTERAL at 05:17

## 2017-01-01 RX ADMIN — ACETAMINOPHEN 650 MG: 325 TABLET ORAL at 22:40

## 2017-01-01 RX ADMIN — Medication 10 ML: at 05:36

## 2017-01-01 RX ADMIN — BUDESONIDE 500 MCG: 0.5 SUSPENSION RESPIRATORY (INHALATION) at 19:56

## 2017-01-01 RX ADMIN — ASPIRIN 81 MG 81 MG: 81 TABLET ORAL at 09:28

## 2017-01-01 RX ADMIN — DIPHENHYDRAMINE HYDROCHLORIDE 25 MG: 25 CAPSULE ORAL at 21:31

## 2017-01-01 RX ADMIN — ALBUTEROL SULFATE 2.5 MG: 2.5 SOLUTION RESPIRATORY (INHALATION) at 02:47

## 2017-01-01 RX ADMIN — ALBUTEROL SULFATE 2.5 MG: 2.5 SOLUTION RESPIRATORY (INHALATION) at 02:33

## 2017-01-01 RX ADMIN — METOPROLOL SUCCINATE 50 MG: 50 TABLET, EXTENDED RELEASE ORAL at 08:28

## 2017-01-01 RX ADMIN — ALBUTEROL SULFATE 2.5 MG: 2.5 SOLUTION RESPIRATORY (INHALATION) at 07:00

## 2017-01-01 RX ADMIN — RISPERIDONE 3 MG: 1 TABLET ORAL at 21:01

## 2017-01-01 RX ADMIN — AMIODARONE HYDROCHLORIDE 200 MG: 200 TABLET ORAL at 21:44

## 2017-01-01 RX ADMIN — APIXABAN 2.5 MG: 2.5 TABLET, FILM COATED ORAL at 09:03

## 2017-01-01 RX ADMIN — FUROSEMIDE 40 MG: 10 INJECTION, SOLUTION INTRAMUSCULAR; INTRAVENOUS at 10:12

## 2017-01-01 RX ADMIN — TAMSULOSIN HYDROCHLORIDE 0.4 MG: 0.4 CAPSULE ORAL at 21:26

## 2017-01-01 RX ADMIN — GUAIFENESIN 1200 MG: 600 TABLET, EXTENDED RELEASE ORAL at 09:22

## 2017-01-01 RX ADMIN — MONTELUKAST SODIUM 10 MG: 10 TABLET, COATED ORAL at 22:05

## 2017-01-01 RX ADMIN — DOCUSATE SODIUM 100 MG: 100 CAPSULE, LIQUID FILLED ORAL at 09:15

## 2017-01-01 RX ADMIN — FUROSEMIDE 80 MG: 40 TABLET ORAL at 09:02

## 2017-01-01 RX ADMIN — APIXABAN 2.5 MG: 2.5 TABLET, FILM COATED ORAL at 17:23

## 2017-01-01 RX ADMIN — POTASSIUM CHLORIDE 40 MEQ: 1500 TABLET, EXTENDED RELEASE ORAL at 01:32

## 2017-01-01 RX ADMIN — AMIODARONE HYDROCHLORIDE 200 MG: 200 TABLET ORAL at 00:49

## 2017-01-01 RX ADMIN — ASPIRIN 81 MG 81 MG: 81 TABLET ORAL at 09:02

## 2017-01-01 RX ADMIN — ALBUTEROL SULFATE 2.5 MG: 2.5 SOLUTION RESPIRATORY (INHALATION) at 13:18

## 2017-01-01 RX ADMIN — APIXABAN 2.5 MG: 2.5 TABLET, FILM COATED ORAL at 20:34

## 2017-01-01 RX ADMIN — METOPROLOL SUCCINATE 50 MG: 50 TABLET, EXTENDED RELEASE ORAL at 09:11

## 2017-01-01 RX ADMIN — POTASSIUM CHLORIDE 20 MEQ: 1.5 POWDER, FOR SOLUTION ORAL at 10:12

## 2017-01-01 RX ADMIN — POTASSIUM CHLORIDE 20 MEQ: 1.5 POWDER, FOR SOLUTION ORAL at 17:10

## 2017-01-01 RX ADMIN — LISINOPRIL 40 MG: 20 TABLET ORAL at 08:00

## 2017-01-01 RX ADMIN — POTASSIUM CHLORIDE 20 MEQ: 1.5 POWDER, FOR SOLUTION ORAL at 08:27

## 2017-01-01 RX ADMIN — CEFTRIAXONE 1 G: 1 INJECTION, POWDER, FOR SOLUTION INTRAMUSCULAR; INTRAVENOUS at 00:23

## 2017-01-01 RX ADMIN — LISINOPRIL 40 MG: 20 TABLET ORAL at 08:19

## 2017-01-01 RX ADMIN — FUROSEMIDE 10 MG/HR: 10 INJECTION, SOLUTION INTRAMUSCULAR; INTRAVENOUS at 21:20

## 2017-01-01 RX ADMIN — GUAIFENESIN 1200 MG: 600 TABLET, EXTENDED RELEASE ORAL at 21:13

## 2017-01-01 RX ADMIN — FUROSEMIDE 80 MG: 40 TABLET ORAL at 20:50

## 2017-01-01 RX ADMIN — ALBUTEROL SULFATE 2.5 MG: 2.5 SOLUTION RESPIRATORY (INHALATION) at 07:30

## 2017-01-01 RX ADMIN — FUROSEMIDE 60 MG: 10 INJECTION, SOLUTION INTRAMUSCULAR; INTRAVENOUS at 19:49

## 2017-01-01 RX ADMIN — RISPERIDONE 4 MG: 1 TABLET ORAL at 22:40

## 2017-01-01 RX ADMIN — APIXABAN 2.5 MG: 2.5 TABLET, FILM COATED ORAL at 17:33

## 2017-01-01 RX ADMIN — ALBUTEROL SULFATE 2.5 MG: 2.5 SOLUTION RESPIRATORY (INHALATION) at 00:50

## 2017-01-01 RX ADMIN — Medication 500 MG: at 09:15

## 2017-01-01 RX ADMIN — KETOROLAC TROMETHAMINE 15 MG: 30 INJECTION, SOLUTION INTRAMUSCULAR at 01:01

## 2017-01-01 RX ADMIN — BUDESONIDE 500 MCG: 0.5 INHALANT RESPIRATORY (INHALATION) at 07:00

## 2017-01-01 RX ADMIN — PANTOPRAZOLE SODIUM 40 MG: 40 TABLET, DELAYED RELEASE ORAL at 07:53

## 2017-01-01 RX ADMIN — FAMOTIDINE 20 MG: 20 TABLET ORAL at 13:45

## 2017-01-01 RX ADMIN — ACETAMINOPHEN 650 MG: 325 TABLET ORAL at 20:34

## 2017-01-01 RX ADMIN — BUDESONIDE 500 MCG: 0.5 INHALANT RESPIRATORY (INHALATION) at 19:14

## 2017-01-01 RX ADMIN — METOPROLOL SUCCINATE 50 MG: 50 TABLET, EXTENDED RELEASE ORAL at 08:05

## 2017-01-01 RX ADMIN — ASPIRIN 81 MG 81 MG: 81 TABLET ORAL at 09:11

## 2017-01-01 RX ADMIN — SIMVASTATIN 20 MG: 20 TABLET, FILM COATED ORAL at 21:01

## 2017-01-01 RX ADMIN — DOCUSATE SODIUM 100 MG: 100 CAPSULE, LIQUID FILLED ORAL at 20:50

## 2017-01-01 RX ADMIN — ALBUTEROL SULFATE 2.5 MG: 2.5 SOLUTION RESPIRATORY (INHALATION) at 13:41

## 2017-01-01 RX ADMIN — PANTOPRAZOLE SODIUM 40 MG: 40 TABLET, DELAYED RELEASE ORAL at 09:09

## 2017-01-01 RX ADMIN — RISPERIDONE 3 MG: 1 TABLET ORAL at 21:13

## 2017-01-01 RX ADMIN — VITAMIN D, TAB 1000IU (100/BT) 1000 UNITS: 25 TAB at 09:16

## 2017-01-01 RX ADMIN — SIMVASTATIN 20 MG: 20 TABLET, FILM COATED ORAL at 00:49

## 2017-01-01 RX ADMIN — LISINOPRIL 40 MG: 20 TABLET ORAL at 08:27

## 2017-01-01 RX ADMIN — METOPROLOL SUCCINATE 50 MG: 50 TABLET, EXTENDED RELEASE ORAL at 20:50

## 2017-01-01 RX ADMIN — LISINOPRIL 40 MG: 20 TABLET ORAL at 09:10

## 2017-01-01 RX ADMIN — ASPIRIN 81 MG 81 MG: 81 TABLET ORAL at 08:28

## 2017-01-01 RX ADMIN — METOPROLOL SUCCINATE 50 MG: 50 TABLET, EXTENDED RELEASE ORAL at 21:15

## 2017-01-01 RX ADMIN — RISPERIDONE 3 MG: 1 TABLET ORAL at 21:38

## 2017-01-01 RX ADMIN — VITAMIN D, TAB 1000IU (100/BT) 1000 UNITS: 25 TAB at 08:27

## 2017-01-01 RX ADMIN — ALBUTEROL SULFATE 2.5 MG: 2.5 SOLUTION RESPIRATORY (INHALATION) at 21:27

## 2017-01-01 RX ADMIN — MONTELUKAST SODIUM 10 MG: 10 TABLET, COATED ORAL at 20:51

## 2017-01-01 RX ADMIN — METOPROLOL SUCCINATE 50 MG: 50 TABLET, EXTENDED RELEASE ORAL at 21:26

## 2017-01-01 RX ADMIN — SIMVASTATIN 20 MG: 20 TABLET, FILM COATED ORAL at 22:58

## 2017-01-01 RX ADMIN — SODIUM CHLORIDE, SODIUM LACTATE, POTASSIUM CHLORIDE, AND CALCIUM CHLORIDE: 600; 310; 30; 20 INJECTION, SOLUTION INTRAVENOUS at 15:46

## 2017-01-01 RX ADMIN — PANTOPRAZOLE SODIUM 40 MG: 40 TABLET, DELAYED RELEASE ORAL at 06:30

## 2017-01-01 RX ADMIN — AMIODARONE HYDROCHLORIDE 200 MG: 200 TABLET ORAL at 22:05

## 2017-01-01 RX ADMIN — POTASSIUM CHLORIDE 20 MEQ: 1.5 POWDER, FOR SOLUTION ORAL at 17:33

## 2017-01-01 RX ADMIN — METOPROLOL SUCCINATE 50 MG: 50 TABLET, EXTENDED RELEASE ORAL at 21:44

## 2017-01-01 RX ADMIN — METOPROLOL SUCCINATE 50 MG: 50 TABLET, EXTENDED RELEASE ORAL at 08:19

## 2017-01-01 RX ADMIN — ALBUTEROL SULFATE 2.5 MG: 2.5 SOLUTION RESPIRATORY (INHALATION) at 19:57

## 2017-01-01 RX ADMIN — ASPIRIN 81 MG: 81 TABLET, CHEWABLE ORAL at 08:00

## 2017-01-01 RX ADMIN — METOPROLOL SUCCINATE 50 MG: 50 TABLET, EXTENDED RELEASE ORAL at 08:13

## 2017-01-01 RX ADMIN — ALBUTEROL SULFATE 2.5 MG: 2.5 SOLUTION RESPIRATORY (INHALATION) at 20:18

## 2017-01-01 RX ADMIN — Medication 10 ML: at 20:51

## 2017-01-01 RX ADMIN — RISPERIDONE 3 MG: 1 TABLET ORAL at 21:15

## 2017-01-01 RX ADMIN — TAMSULOSIN HYDROCHLORIDE 0.4 MG: 0.4 CAPSULE ORAL at 21:15

## 2017-01-01 RX ADMIN — SIMVASTATIN 20 MG: 20 TABLET, FILM COATED ORAL at 21:52

## 2017-01-01 RX ADMIN — ACETAMINOPHEN 650 MG: 325 TABLET ORAL at 16:32

## 2017-01-01 RX ADMIN — HYDROCODONE BITARTRATE AND ACETAMINOPHEN 1 TABLET: 5; 325 TABLET ORAL at 03:34

## 2017-01-01 RX ADMIN — FUROSEMIDE 40 MG: 40 TABLET ORAL at 16:27

## 2017-01-01 RX ADMIN — HYDROMORPHONE HYDROCHLORIDE 0.5 MG: 2 INJECTION, SOLUTION INTRAMUSCULAR; INTRAVENOUS; SUBCUTANEOUS at 18:32

## 2017-01-01 RX ADMIN — TAMSULOSIN HYDROCHLORIDE 0.4 MG: 0.4 CAPSULE ORAL at 08:27

## 2017-01-01 RX ADMIN — SIMVASTATIN 20 MG: 20 TABLET, FILM COATED ORAL at 20:50

## 2017-01-01 RX ADMIN — DOXAZOSIN MESYLATE 1 MG: 1 TABLET ORAL at 21:26

## 2017-01-01 RX ADMIN — ALBUTEROL SULFATE 2.5 MG: 2.5 SOLUTION RESPIRATORY (INHALATION) at 19:24

## 2017-01-01 RX ADMIN — PANTOPRAZOLE SODIUM 40 MG: 40 TABLET, DELAYED RELEASE ORAL at 05:34

## 2017-01-01 RX ADMIN — ALBUTEROL SULFATE 2.5 MG: 2.5 SOLUTION RESPIRATORY (INHALATION) at 02:14

## 2017-01-01 RX ADMIN — PANTOPRAZOLE SODIUM 40 MG: 40 TABLET, DELAYED RELEASE ORAL at 05:36

## 2017-01-01 RX ADMIN — DOCUSATE SODIUM 100 MG: 100 CAPSULE, LIQUID FILLED ORAL at 22:05

## 2017-01-01 RX ADMIN — Medication 10 ML: at 15:26

## 2017-01-01 RX ADMIN — ALBUTEROL SULFATE 2.5 MG: 2.5 SOLUTION RESPIRATORY (INHALATION) at 03:32

## 2017-01-01 RX ADMIN — BUDESONIDE 500 MCG: 0.5 INHALANT RESPIRATORY (INHALATION) at 21:27

## 2017-01-01 RX ADMIN — DIPHENHYDRAMINE HYDROCHLORIDE 25 MG: 25 CAPSULE ORAL at 00:21

## 2017-01-01 RX ADMIN — POTASSIUM CHLORIDE 20 MEQ: 1.5 POWDER, FOR SOLUTION ORAL at 09:23

## 2017-01-01 RX ADMIN — FUROSEMIDE 60 MG: 10 INJECTION, SOLUTION INTRAMUSCULAR; INTRAVENOUS at 21:14

## 2017-01-01 RX ADMIN — METOPROLOL SUCCINATE 50 MG: 50 TABLET, EXTENDED RELEASE ORAL at 09:09

## 2017-01-01 RX ADMIN — ALBUTEROL SULFATE 2.5 MG: 2.5 SOLUTION RESPIRATORY (INHALATION) at 14:29

## 2017-01-01 RX ADMIN — ALBUTEROL SULFATE 2.5 MG: 2.5 SOLUTION RESPIRATORY (INHALATION) at 14:06

## 2017-01-01 RX ADMIN — PANTOPRAZOLE SODIUM 40 MG: 40 TABLET, DELAYED RELEASE ORAL at 08:55

## 2017-01-01 RX ADMIN — PANTOPRAZOLE SODIUM 40 MG: 40 TABLET, DELAYED RELEASE ORAL at 08:13

## 2017-01-01 RX ADMIN — SIMVASTATIN 20 MG: 20 TABLET, FILM COATED ORAL at 21:26

## 2017-01-01 RX ADMIN — SODIUM CHLORIDE 75 ML/HR: 900 INJECTION, SOLUTION INTRAVENOUS at 22:03

## 2017-01-01 RX ADMIN — DIPHENHYDRAMINE HYDROCHLORIDE 25 MG: 25 CAPSULE ORAL at 21:15

## 2017-01-01 RX ADMIN — METOPROLOL SUCCINATE 50 MG: 50 TABLET, EXTENDED RELEASE ORAL at 21:38

## 2017-01-01 RX ADMIN — VITAMIN D, TAB 1000IU (100/BT) 1000 UNITS: 25 TAB at 09:11

## 2017-01-01 RX ADMIN — LIDOCAINE HYDROCHLORIDE: 20 JELLY TOPICAL at 16:00

## 2017-01-01 RX ADMIN — METOPROLOL SUCCINATE 50 MG: 50 TABLET, EXTENDED RELEASE ORAL at 08:54

## 2017-01-01 RX ADMIN — CEFTRIAXONE 1 G: 1 INJECTION, POWDER, FOR SOLUTION INTRAMUSCULAR; INTRAVENOUS at 00:57

## 2017-01-01 RX ADMIN — ALBUTEROL SULFATE 2.5 MG: 2.5 SOLUTION RESPIRATORY (INHALATION) at 08:34

## 2017-01-01 RX ADMIN — PROPOFOL 20 MG: 10 INJECTION, EMULSION INTRAVENOUS at 15:58

## 2017-01-01 RX ADMIN — POTASSIUM CHLORIDE 20 MEQ: 1.5 POWDER, FOR SOLUTION ORAL at 09:15

## 2017-01-01 RX ADMIN — ASPIRIN 81 MG: 81 TABLET, CHEWABLE ORAL at 08:19

## 2017-01-01 RX ADMIN — PANTOPRAZOLE SODIUM 40 MG: 40 TABLET, DELAYED RELEASE ORAL at 05:17

## 2017-01-01 RX ADMIN — BUDESONIDE 500 MCG: 0.5 INHALANT RESPIRATORY (INHALATION) at 02:46

## 2017-01-01 RX ADMIN — PANTOPRAZOLE SODIUM 40 MG: 40 TABLET, DELAYED RELEASE ORAL at 07:59

## 2017-01-01 RX ADMIN — BUDESONIDE 500 MCG: 0.5 SUSPENSION RESPIRATORY (INHALATION) at 07:32

## 2017-01-01 RX ADMIN — SIMVASTATIN 20 MG: 20 TABLET, FILM COATED ORAL at 21:38

## 2017-01-01 RX ADMIN — DOXAZOSIN MESYLATE 1 MG: 1 TABLET ORAL at 21:01

## 2017-01-01 RX ADMIN — PROPOFOL 50 MCG/KG/MIN: 10 INJECTION, EMULSION INTRAVENOUS at 15:58

## 2017-01-01 RX ADMIN — RISPERIDONE 4 MG: 1 TABLET ORAL at 22:58

## 2017-01-01 RX ADMIN — LISINOPRIL 40 MG: 20 TABLET ORAL at 08:57

## 2017-01-01 RX ADMIN — Medication 10 ML: at 05:17

## 2017-01-01 RX ADMIN — FUROSEMIDE 40 MG: 10 INJECTION, SOLUTION INTRAMUSCULAR; INTRAVENOUS at 18:39

## 2017-01-01 RX ADMIN — FUROSEMIDE 40 MG: 40 TABLET ORAL at 16:58

## 2017-01-01 RX ADMIN — FUROSEMIDE 80 MG: 40 TABLET ORAL at 08:27

## 2017-01-01 RX ADMIN — DOXAZOSIN MESYLATE 1 MG: 1 TABLET ORAL at 21:13

## 2017-01-01 RX ADMIN — CEFAZOLIN 2 G: 1 INJECTION, POWDER, FOR SOLUTION INTRAMUSCULAR; INTRAVENOUS; PARENTERAL at 22:29

## 2017-01-01 RX ADMIN — ACETAMINOPHEN 650 MG: 325 TABLET ORAL at 09:09

## 2017-01-01 RX ADMIN — FUROSEMIDE 80 MG: 40 TABLET ORAL at 17:22

## 2017-01-01 RX ADMIN — DIPHENHYDRAMINE HCL 25 MG: 25 CAPSULE ORAL at 22:04

## 2017-01-01 RX ADMIN — SIMVASTATIN 20 MG: 20 TABLET, FILM COATED ORAL at 21:13

## 2017-01-01 RX ADMIN — POTASSIUM CHLORIDE 20 MEQ: 1.5 POWDER, FOR SOLUTION ORAL at 09:10

## 2017-01-01 RX ADMIN — AMIODARONE HYDROCHLORIDE 200 MG: 200 TABLET ORAL at 09:22

## 2017-01-01 RX ADMIN — AMIODARONE HYDROCHLORIDE 200 MG: 200 TABLET ORAL at 09:10

## 2017-01-01 RX ADMIN — Medication 10 ML: at 21:44

## 2017-01-01 RX ADMIN — AMIODARONE HYDROCHLORIDE 200 MG: 200 TABLET ORAL at 10:12

## 2017-01-01 RX ADMIN — POTASSIUM CHLORIDE 20 MEQ: 1.5 POWDER, FOR SOLUTION ORAL at 17:05

## 2017-01-01 RX ADMIN — METOPROLOL SUCCINATE 50 MG: 50 TABLET, EXTENDED RELEASE ORAL at 09:02

## 2017-01-01 RX ADMIN — TUBERCULIN PURIFIED PROTEIN DERIVATIVE 5 UNITS: 5 INJECTION, SOLUTION INTRADERMAL at 12:31

## 2017-01-01 RX ADMIN — HYDROMORPHONE HYDROCHLORIDE 0.5 MG: 2 INJECTION, SOLUTION INTRAMUSCULAR; INTRAVENOUS; SUBCUTANEOUS at 18:38

## 2017-01-01 RX ADMIN — AMIODARONE HYDROCHLORIDE 200 MG: 200 TABLET ORAL at 22:58

## 2017-01-01 RX ADMIN — ALBUTEROL SULFATE 2.5 MG: 2.5 SOLUTION RESPIRATORY (INHALATION) at 19:22

## 2017-01-01 RX ADMIN — ALBUTEROL SULFATE 2.5 MG: 2.5 SOLUTION RESPIRATORY (INHALATION) at 06:55

## 2017-01-01 RX ADMIN — RISPERIDONE 4 MG: 1 TABLET ORAL at 00:40

## 2017-01-01 RX ADMIN — ALBUTEROL SULFATE 2.5 MG: 2.5 SOLUTION RESPIRATORY (INHALATION) at 13:51

## 2017-01-01 RX ADMIN — METOPROLOL SUCCINATE 50 MG: 50 TABLET, EXTENDED RELEASE ORAL at 09:59

## 2017-01-01 RX ADMIN — FUROSEMIDE 60 MG: 10 INJECTION, SOLUTION INTRAMUSCULAR; INTRAVENOUS at 09:22

## 2017-01-01 RX ADMIN — METOPROLOL SUCCINATE 50 MG: 50 TABLET, EXTENDED RELEASE ORAL at 09:15

## 2017-01-01 RX ADMIN — AMIODARONE HYDROCHLORIDE 200 MG: 200 TABLET ORAL at 09:09

## 2017-01-01 RX ADMIN — GUAIFENESIN 1200 MG: 600 TABLET, EXTENDED RELEASE ORAL at 08:56

## 2017-01-01 RX ADMIN — PANTOPRAZOLE SODIUM 40 MG: 40 TABLET, DELAYED RELEASE ORAL at 05:44

## 2017-01-01 RX ADMIN — HYDROCODONE BITARTRATE AND ACETAMINOPHEN 1 TABLET: 5; 325 TABLET ORAL at 09:11

## 2017-01-01 RX ADMIN — BUDESONIDE 500 MCG: 0.5 INHALANT RESPIRATORY (INHALATION) at 08:40

## 2017-01-01 RX ADMIN — FUROSEMIDE 80 MG: 40 TABLET ORAL at 17:11

## 2017-01-01 RX ADMIN — ALBUTEROL SULFATE 2.5 MG: 2.5 SOLUTION RESPIRATORY (INHALATION) at 14:45

## 2017-01-01 RX ADMIN — Medication 10 ML: at 15:23

## 2017-01-01 RX ADMIN — METOPROLOL SUCCINATE 50 MG: 50 TABLET, EXTENDED RELEASE ORAL at 21:01

## 2017-01-01 RX ADMIN — SIMVASTATIN 20 MG: 20 TABLET, FILM COATED ORAL at 21:50

## 2017-01-02 ENCOUNTER — PATIENT OUTREACH (OUTPATIENT)
Dept: CASE MANAGEMENT | Age: 82
End: 2017-01-02

## 2017-01-02 NOTE — PROGRESS NOTES
Transition of Care Discharge Follow-up Questionnaire     Date/Time of Call:   January 2, 2017  11:17AM   What was the patient hospitalized for? Patient hospitalized for Acute on Chronic congestive heart failure             Does the patient understand his/her diagnosis and/or treatment and what happened during the hospitalization? Patient states understanding of diagnosis and treatment during hospitalization. Did the patient receive discharge instructions? Patient states discharge instructions explained and received before discharge to home. Review any discharge instructions (see notes in [facility]). Ask patient if they understand these. Do they have any questions? Discharge instructions reviewed with  patient per [facility], opportunity for questions and clarification provided. Patient states no questions at this time. Were home services ordered (nursing, PT, OT, ST, etc.)? Patient states no home health services ordered. If so, has the first visit occurred? If not, why? (Assist with coordination of services if necessary. ) NA         Was any DME ordered? Patient states no durable medical equipment ordered. If so, has it been received? If not, why?  (Assist with coordination of arranging DME orders if necessary. ) NA         Complete a review of all medications (new, continued and discontinued meds per the D/C instructions and medication tab in [facility]). Care Coordinator reviewed all medications with patient per [facility], patient states two new prescriptions added before discharge to home. Were all new prescriptions filled? If not, why?  (Assist with obtainment of medications if necessary.) Patient states new prescriptions filled and currently being taken per doctors order. Does the patient understand the purpose and dosing instructions for all medications?   (If patient has questions, provide explanation and education.) Patient states understanding of medication purposes and dosing instructions. Does the patient have any problems in performing ADLs? (If patient is unable to perform ADLs - what is the limiting factor(s)? Do they have a support system that can assist? If no support system is present, discuss possible assistance that they may be able to obtain.) Patient states he can perform ADL's and requires no assistance. Does the patient have all follow-up appointments scheduled? Has transportation been arranged? [facility] Pulmonary follow-up should be within 7 days of discharge; all others should have PCP follow-up within 7 days of discharge; follow-ups with other specialists as appropriate or ordered.) Patient states follow up appointment scheduled with [address] [place] [address], January 5, 2017 @ 2:00PM with Dr. Sandhya Chou. [facility], January 13, 2017 @ 3:20PM with Jaci Fortune, January 3, 2017 with Dr. Maria T Valadez (PCP)  Patient states no transportation needs at this time. Any other questions or concerns expressed by the patient? Patient expresses no questions or concerns. Schedule next appointment with YONATAN COLLAZO Coordinator or refer to RN Case Manager/  as appropriate. No further needs identified, patient instructed to call Care Coordinator if further questions or concerns arise.    JOHNATHON Call Completed By: Veronica Jarrell LPN  Care Coordinator   [facility]

## 2017-01-03 ENCOUNTER — HOME CARE VISIT (OUTPATIENT)
Dept: SCHEDULING | Facility: HOME HEALTH | Age: 82
End: 2017-01-03
Payer: MEDICARE

## 2017-01-03 ENCOUNTER — TELEPHONE (OUTPATIENT)
Dept: HOME HEALTH SERVICES | Facility: HOME HEALTH | Age: 82
End: 2017-01-03

## 2017-01-03 VITALS
TEMPERATURE: 97.3 F | RESPIRATION RATE: 18 BRPM | OXYGEN SATURATION: 98 % | SYSTOLIC BLOOD PRESSURE: 104 MMHG | DIASTOLIC BLOOD PRESSURE: 64 MMHG | HEART RATE: 80 BPM

## 2017-01-03 PROCEDURE — 3331090002 HH PPS REVENUE DEBIT

## 2017-01-03 PROCEDURE — G0299 HHS/HOSPICE OF RN EA 15 MIN: HCPCS

## 2017-01-03 PROCEDURE — 400013 HH SOC

## 2017-01-03 PROCEDURE — 3331090001 HH PPS REVENUE CREDIT

## 2017-01-03 NOTE — TELEPHONE ENCOUNTER
[address] [place] [address] [facility] Kanika Weiner Pharmacist consult. Mr. Laura Mckeon was discharged prior to my consult. I spoke with him today and explained my part of the [facility] team.  I reviewed his discharge medications. He said he did see an improvement with the Flomax. He is breathing well with his O2 and hopes that it will not get discontinued. I complemented him on taking a lot of good vitamins. I gave him my cell phone number and asked him to call with any medication questions or issues. He said the [facility] nurse was coming tomorrow to take blood. He appreciated that because it saved him a trip to the [place].  He said I could call back any time.  1/3/17 4117

## 2017-01-04 ENCOUNTER — HOSPITAL ENCOUNTER (OUTPATIENT)
Dept: LAB | Age: 82
Discharge: HOME | End: 2017-01-04
Payer: MEDICARE

## 2017-01-04 ENCOUNTER — HOME CARE VISIT (OUTPATIENT)
Dept: SCHEDULING | Facility: HOME HEALTH | Age: 82
End: 2017-01-04
Payer: MEDICARE

## 2017-01-04 VITALS
TEMPERATURE: 96.2 F | RESPIRATION RATE: 19 BRPM | DIASTOLIC BLOOD PRESSURE: 68 MMHG | HEART RATE: 70 BPM | OXYGEN SATURATION: 98 % | SYSTOLIC BLOOD PRESSURE: 140 MMHG

## 2017-01-04 LAB
ALBUMIN SERPL BCP-MCNC: 3 G/DL (ref 3.2–4.6)
ALBUMIN/GLOB SERPL: 0.8 {RATIO} (ref 1.2–3.5)
ALP SERPL-CCNC: 64 U/L (ref 50–136)
ALT SERPL-CCNC: 15 U/L (ref 12–65)
ANION GAP BLD CALC-SCNC: 9 MMOL/L (ref 7–16)
AST SERPL W P-5'-P-CCNC: 20 U/L (ref 15–37)
BILIRUB SERPL-MCNC: 0.5 MG/DL (ref 0.2–1.1)
BUN SERPL-MCNC: 29 MG/DL (ref 8–23)
CALCIUM SERPL-MCNC: 9 MG/DL (ref 8.3–10.4)
CHLORIDE SERPL-SCNC: 94 MMOL/L (ref 98–107)
CO2 SERPL-SCNC: 36 MMOL/L (ref 21–32)
CREAT SERPL-MCNC: 2.04 MG/DL (ref 0.8–1.5)
GLOBULIN SER CALC-MCNC: 3.7 G/DL (ref 2.3–3.5)
GLUCOSE SERPL-MCNC: 126 MG/DL (ref 65–100)
POTASSIUM SERPL-SCNC: 4.2 MMOL/L (ref 3.5–5.1)
PROT SERPL-MCNC: 6.7 G/DL (ref 6.3–8.2)
SODIUM SERPL-SCNC: 139 MMOL/L (ref 136–145)

## 2017-01-04 PROCEDURE — G0299 HHS/HOSPICE OF RN EA 15 MIN: HCPCS

## 2017-01-04 PROCEDURE — 80053 COMPREHEN METABOLIC PANEL: CPT | Performed by: INTERNAL MEDICINE

## 2017-01-04 PROCEDURE — 3331090001 HH PPS REVENUE CREDIT

## 2017-01-04 PROCEDURE — 3331090002 HH PPS REVENUE DEBIT

## 2017-01-05 ENCOUNTER — HOME CARE VISIT (OUTPATIENT)
Dept: HOME HEALTH SERVICES | Facility: HOME HEALTH | Age: 82
End: 2017-01-05
Payer: MEDICARE

## 2017-01-05 PROCEDURE — 3331090002 HH PPS REVENUE DEBIT

## 2017-01-05 PROCEDURE — 3331090001 HH PPS REVENUE CREDIT

## 2017-01-06 ENCOUNTER — HOME CARE VISIT (OUTPATIENT)
Dept: HOME HEALTH SERVICES | Facility: HOME HEALTH | Age: 82
End: 2017-01-06
Payer: MEDICARE

## 2017-01-06 PROCEDURE — 3331090001 HH PPS REVENUE CREDIT

## 2017-01-06 PROCEDURE — 3331090002 HH PPS REVENUE DEBIT

## 2017-01-07 ENCOUNTER — HOME CARE VISIT (OUTPATIENT)
Dept: HOME HEALTH SERVICES | Facility: HOME HEALTH | Age: 82
End: 2017-01-07
Payer: MEDICARE

## 2017-01-07 PROCEDURE — 3331090001 HH PPS REVENUE CREDIT

## 2017-01-07 PROCEDURE — 3331090002 HH PPS REVENUE DEBIT

## 2017-01-08 PROCEDURE — 3331090001 HH PPS REVENUE CREDIT

## 2017-01-08 PROCEDURE — 3331090002 HH PPS REVENUE DEBIT

## 2017-01-09 PROCEDURE — 3331090001 HH PPS REVENUE CREDIT

## 2017-01-09 PROCEDURE — 3331090002 HH PPS REVENUE DEBIT

## 2017-01-10 ENCOUNTER — HOME CARE VISIT (OUTPATIENT)
Dept: SCHEDULING | Facility: HOME HEALTH | Age: 82
End: 2017-01-10
Payer: MEDICARE

## 2017-01-10 VITALS
HEART RATE: 93 BPM | TEMPERATURE: 97.5 F | SYSTOLIC BLOOD PRESSURE: 124 MMHG | DIASTOLIC BLOOD PRESSURE: 68 MMHG | OXYGEN SATURATION: 99 % | RESPIRATION RATE: 19 BRPM

## 2017-01-10 PROCEDURE — 3331090002 HH PPS REVENUE DEBIT

## 2017-01-10 PROCEDURE — G0299 HHS/HOSPICE OF RN EA 15 MIN: HCPCS

## 2017-01-10 PROCEDURE — 3331090001 HH PPS REVENUE CREDIT

## 2017-01-11 PROCEDURE — 3331090002 HH PPS REVENUE DEBIT

## 2017-01-11 PROCEDURE — 3331090001 HH PPS REVENUE CREDIT

## 2017-01-12 PROCEDURE — 3331090002 HH PPS REVENUE DEBIT

## 2017-01-12 PROCEDURE — 3331090001 HH PPS REVENUE CREDIT

## 2017-01-13 ENCOUNTER — TELEPHONE (OUTPATIENT)
Dept: HOME HEALTH SERVICES | Facility: HOME HEALTH | Age: 82
End: 2017-01-13

## 2017-01-13 PROCEDURE — 3331090001 HH PPS REVENUE CREDIT

## 2017-01-13 PROCEDURE — 3331090002 HH PPS REVENUE DEBIT

## 2017-01-14 PROCEDURE — 3331090002 HH PPS REVENUE DEBIT

## 2017-01-14 PROCEDURE — 3331090001 HH PPS REVENUE CREDIT

## 2017-01-15 PROCEDURE — 3331090001 HH PPS REVENUE CREDIT

## 2017-01-15 PROCEDURE — 3331090002 HH PPS REVENUE DEBIT

## 2017-01-16 PROCEDURE — 3331090002 HH PPS REVENUE DEBIT

## 2017-01-16 PROCEDURE — 3331090001 HH PPS REVENUE CREDIT

## 2017-01-17 PROCEDURE — 3331090002 HH PPS REVENUE DEBIT

## 2017-01-17 PROCEDURE — 3331090001 HH PPS REVENUE CREDIT

## 2017-01-18 ENCOUNTER — HOME CARE VISIT (OUTPATIENT)
Dept: SCHEDULING | Facility: HOME HEALTH | Age: 82
End: 2017-01-18
Payer: MEDICARE

## 2017-01-18 VITALS
DIASTOLIC BLOOD PRESSURE: 60 MMHG | TEMPERATURE: 98.5 F | OXYGEN SATURATION: 96 % | RESPIRATION RATE: 17 BRPM | HEART RATE: 73 BPM | SYSTOLIC BLOOD PRESSURE: 138 MMHG

## 2017-01-18 PROCEDURE — 3331090001 HH PPS REVENUE CREDIT

## 2017-01-18 PROCEDURE — G0299 HHS/HOSPICE OF RN EA 15 MIN: HCPCS

## 2017-01-18 PROCEDURE — 3331090002 HH PPS REVENUE DEBIT

## 2017-01-19 ENCOUNTER — TELEPHONE (OUTPATIENT)
Dept: HOME HEALTH SERVICES | Facility: HOME HEALTH | Age: 82
End: 2017-01-19

## 2017-01-19 PROCEDURE — 3331090002 HH PPS REVENUE DEBIT

## 2017-01-19 PROCEDURE — 3331090001 HH PPS REVENUE CREDIT

## 2017-01-20 PROCEDURE — 3331090001 HH PPS REVENUE CREDIT

## 2017-01-20 PROCEDURE — 3331090002 HH PPS REVENUE DEBIT

## 2017-01-21 PROCEDURE — 3331090001 HH PPS REVENUE CREDIT

## 2017-01-21 PROCEDURE — 3331090002 HH PPS REVENUE DEBIT

## 2017-01-22 PROCEDURE — 3331090002 HH PPS REVENUE DEBIT

## 2017-01-22 PROCEDURE — 3331090001 HH PPS REVENUE CREDIT

## 2017-01-23 PROCEDURE — 3331090001 HH PPS REVENUE CREDIT

## 2017-01-23 PROCEDURE — 3331090002 HH PPS REVENUE DEBIT

## 2017-01-24 PROCEDURE — 3331090002 HH PPS REVENUE DEBIT

## 2017-01-24 PROCEDURE — 3331090001 HH PPS REVENUE CREDIT

## 2017-03-23 NOTE — ED TRIAGE NOTES
Pt. Complains of shortness of breath and increasing weakness. States had blood drawn today, ordered by [facility]. Pt. Called and told bnp was elevated.

## 2017-03-23 NOTE — IP AVS SNAPSHOT
Radha Edmond 
 
 
 [address] [facility] 322 [facility] 
[phone] Patient: Sanjeev Jimenez MRN: [id number] RMX:[id number] You are allergic to the following No active allergies Immunizations Administered for This Admission Name Date  
 TB Skin Test (PPD) Intradermal 3/24/2017 Recent Documentation Height Weight BMI Smoking Status 1.651 m 67 kg 24.6 kg/m2 Former Smoker Emergency Contacts Name Discharge Info Relation Home Work Mobile Pema Drew  Daughter [21] [phone] About your hospitalization You were admitted on:  March 23, 2017 You last received care in the:  [facility] 3 TELEMETRY You were discharged on:  March 28, 2017 Unit phone number:  [phone] Why you were hospitalized Your primary diagnosis was:  Acute On Chronic Systolic (Congestive) Heart Failure (Hcc) Your diagnoses also included:  Cad (Coronary Artery Disease), Hyperlipidemia, Htn (Hypertension), Icd (Implantable Cardioverter-Defibrillator) In Place, Ckd (Chronic Kidney Disease) Stage 3, Gfr 30-59 Ml/Min, Pleural Effusion, Bilateral, Copd (Chronic Obstructive Pulmonary Disease) (Hcc) Providers Seen During Your Hospitalizations Provider Role Specialty Primary office phone Shannan Guy MD Attending Provider Emergency Medicine [phone] Wilmer Espinal MD Attending Provider Cardiology [phone] Your Primary Care Physician (PCP) Primary Care Physician Office Phone Office Fax Nilesh Case [phone] Follow-up Information Follow up With Details Comments Contact Info Demetrio Longo MD On 4/19/2017 at 10:15am [address] [facility] [address] [place], [place] [address]y 2 
[phone] Tray Salmon MD On 3/31/2017 TC-7 1:30 pm [place] office [address] Cardiology [facility] [address] 
[phone] Your Appointments Friday March 31, 2017  1:45 PM EDT TRANSITIONAL CARE MANAGEMENT with Jackson Carroll MD  
[facility] Cardiology ([address] [place] [address]) [address]  
[phone] Tuesday April 18, 2017  4:10 PM EDT Office Visit with Lucinda Gonzalez MD  
[facility] Urology 48 ([facility] [address]) [address] [place] [address]  
[phone] Wednesday April 26, 2017  8:00 AM EDT  
OFFICE DEVICE CHECKS with GVLLE DEVICE 39 [facility] Cardiology ([address] [place] [address]) [address]  
[phone] Wednesday April 26, 2017  8:30 AM EDT Office Visit with Ramon Gillis MD  
[facility] Cardiology ([address] [place] [address]) [address]  
[phone] Monday May 08, 2017  2:00 PM EDT  
(Arrive by 1:30 PM) Return appointment with Cr Santos NP [facility] ([facility]) 75 [facility] [address] [facility]  
[phone] Current Discharge Medication List  
  
START taking these medications Dose & Instructions Dispensing Information Comments Morning Noon Evening Bedtime  
 guaiFENesin 1,200 mg Ta12 ER tablet Commonly known as:  [address] Your last dose was: Your next dose is:    
   
   
 Dose:  1200 mg Take 1 Tab by mouth every twelve (12) hours. Quantity:  60 Tab Refills:  1 CONTINUE these medications which have CHANGED Dose & Instructions Dispensing Information Comments Morning Noon Evening Bedtime  
 furosemide 80 mg tablet Commonly known as:  LASIX What changed:   
- medication strength 
- how much to take Your last dose was: Your next dose is:    
   
   
 Dose:  80 mg Take 1 Tab by mouth two (2) times a day. Quantity:  60 Tab Refills:  11  
 CONTINUE these medications which have NOT CHANGED Dose & Instructions Dispensing Information Comments Morning Noon Evening Bedtime * albuterol 90 mcg/actuation inhaler Commonly known as:  PROAIR HFA Your last dose was: Your next dose is:    
   
   
 2 puffs qid prn Quantity:  1 Inhaler Refills:  11  
     
   
   
   
  
 * albuterol 2.5 mg /3 mL (0.083 %) nebulizer solution Commonly known as:  PROVENTIL VENTOLIN Your last dose was: Your next dose is:    
   
   
 1 vial via nebulizer qid and prn;  Bill to medicare part B, dx COPD - J44.9  Indications: Chronic Obstructive Pulmonary Disease Quantity:  120 Each Refills:  11 Dispense #120 vials  
    
   
   
   
  
 ascorbic acid (vitamin C) 500 mg tablet Commonly known as:  VITAMIN C Your last dose was: Your next dose is: Take  by mouth. Refills:  0  
     
   
   
   
  
 BABY ASPIRIN 81 mg chewable tablet Generic drug:  aspirin Your last dose was: Your next dose is:    
   
   
 Dose:  81 mg Take 81 mg by mouth daily. Refills:  0  
     
   
   
   
  
 BENADRYL 25 mg capsule Generic drug:  diphenhydrAMINE Your last dose was: Your next dose is:    
   
   
 Dose:  25 mg Take 25 mg by mouth nightly. Refills:  0  
     
   
   
   
  
 budesonide-formoterol 160-4.5 mcg/actuation HFA inhaler Commonly known as:  SYMBICORT Your last dose was: Your next dose is:    
   
   
 2 puffs bid, rinse mouth after use Quantity:  1 Inhaler Refills:  11  
     
   
   
   
  
 doxazosin 4 mg tablet Commonly known as:  CARDURA Your last dose was: Your next dose is:    
   
   
 Dose:  1 mg Take 1 mg by mouth nightly. Refills:  0  
     
   
   
   
  
 lisinopril 40 mg tablet Commonly known as:  Martha German Your last dose was: Your next dose is: TAKE ONE TABLET BY MOUTH ONCE DAILY Quantity:  30 Tab Refills:  0  
     
   
   
   
  
 metoprolol succinate 50 mg XL tablet Commonly known as:  TOPROL-XL Your last dose was: Your next dose is:    
   
   
 Dose:  50 mg Take 1 Tab by mouth every twelve (12) hours. Quantity:  180 Tab Refills:  3  
     
   
   
   
  
 montelukast 10 mg tablet Commonly known as:  SINGULAIR Your last dose was: Your next dose is:    
   
   
 1 po q hs Quantity:  30 Tab Refills:  11  
     
   
   
   
  
 OCUVITE PO Your last dose was: Your next dose is: Take  by mouth. Refills:  0  
     
   
   
   
  
 omeprazole 20 mg capsule Commonly known as:  PRILOSEC Your last dose was: Your next dose is:    
   
   
 Dose:  20 mg Take 20 mg by mouth two (2) times a day. Refills:  0 OXYGEN-AIR DELIVERY SYSTEMS Your last dose was: Your next dose is:    
   
   
 Dose:  3 L  
3 L by Nasal route daily. 3 LPM Continuous every day and QHS Refills:  0 RHINOCORT AQUA 32 mcg/actuation nasal spray Generic drug:  budesonide Your last dose was: Your next dose is:    
   
   
 Dose:  2 Spray 2 Sprays by Both Nostrils route. Refills:  0  
     
   
   
   
  
 risperiDONE 3 mg tablet Commonly known as:  RisperDAL Your last dose was: Your next dose is: Take  by mouth nightly. Refills:  0  
     
   
   
   
  
 simvastatin 20 mg tablet Commonly known as:  ZOCOR Your last dose was: Your next dose is: Take  by mouth nightly. Refills:  0  
     
   
   
   
  
 tamsulosin 0.4 mg capsule Commonly known as:  FLOMAX Your last dose was: Your next dose is:    
   
   
 Dose:  0.4 mg Take 1 Cap by mouth nightly. Quantity:  30 Cap Refills:  6 VITAMIN D3 1,000 unit Cap Generic drug:  cholecalciferol Your last dose was: Your next dose is: Take  by mouth. Refills:  0  
     
   
   
   
  
 vitamin E acetate 400 unit Cap capsule Your last dose was: Your next dose is: Take  by mouth daily. Refills:  0 ZINC ACETATE PO Your last dose was: Your next dose is: Take  by mouth. Refills:  0  
     
   
   
   
  
 * Notice: This list has 2 medication(s) that are the same as other medications prescribed for you. Read the directions carefully, and ask your doctor or other care provider to review them with you. STOP taking these medications   
 potassium chloride 20 mEq packet Commonly known as:  KLOR-CON Where to Get Your Medications Information on where to get these meds will be given to you by the nurse or doctor. ! Ask your nurse or doctor about these medications  
  furosemide 80 mg tablet  
 guaiFENesin 1,200 mg Ta12 ER tablet Discharge Instructions Heart Failure: Care Instructions Your Care Instructions Heart failure occurs when your heart does not pump as much blood as the body needs. Failure does not mean that the heart has stopped pumping but rather that it is not pumping as well as it should. Over time, this causes fluid buildup in your lungs and other parts of your body. Fluid buildup can cause shortness of breath, fatigue, swollen ankles, and other problems. By taking medicines regularly, reducing sodium (salt) in your diet, checking your weight every day, and making lifestyle changes, you can feel better and live longer. Follow-up care is a key part of your treatment and safety. Be sure to make and go to all appointments, and call your doctor if you are having problems. It's also a good idea to know your test results and keep a list of the medicines you take. How can you care for yourself at home? Medicines · Be safe with medicines. Take your medicines exactly as prescribed. Call your doctor if you think you are having a problem with your medicine. · Do not take any vitamins, over-the-counter medicine, or herbal products without talking to your doctor first. Heaven Arteaga not take ibuprofen (Advil or Motrin) and naproxen (Aleve) without talking to your doctor first. They could make your heart failure worse. · You may be taking some of the following medicine. ¨ Beta-blockers can slow heart rate, decrease blood pressure, and improve your condition. Taking a beta-blocker may lower your chance of needing to be hospitalized. ¨ Angiotensin-converting enzyme inhibitors (ACEIs) reduce the heart's workload, lower blood pressure, and reduce swelling. Taking an ACEI may lower your chance of needing to be hospitalized again. ¨ Angiotensin II receptor blockers (ARBs) work like ACEIs. Your doctor may prescribe them instead of ACEIs. ¨ Diuretics, also called water pills, reduce swelling. ¨ Potassium supplements replace this important mineral, which is sometimes lost with diuretics. ¨ Aspirin and other blood thinners prevent blood clots, which can cause a stroke or heart attack. You will get more details on the specific medicines your doctor prescribes. Diet · Your doctor may suggest that you limit sodium to 2,000 milligrams (mg) a day or less. That is less than 1 teaspoon of salt a day, including all the salt you eat in cooking or in packaged foods. People get most of their sodium from processed foods. Fast food and restaurant meals also tend to be very high in sodium. · Ask your doctor how much liquid you can drink each day. You may have to limit liquids. Weight · Weigh yourself without clothing at the same time each day. Record your weight. Call your doctor if you gain more than 3 pounds in 2 to 3 days. A sudden weight gain may mean that your heart failure is getting worse. Activity level · Start light exercise (if your doctor says it is okay). Even if you can only do a small amount, exercise will help you get stronger, have more energy, and manage your weight and your stress. Walking is an easy way to get exercise. Start out by walking a little more than you did before. Bit by bit, increase the amount you walk. · When you exercise, watch for signs that your heart is working too hard. You are pushing yourself too hard if you cannot talk while you are exercising. If you become short of breath or dizzy or have chest pain, stop, sit down, and rest. 
· If you feel \"wiped out\" the day after you exercise, walk slower or for a shorter distance until you can work up to a better pace. · Get enough rest at night. Sleeping with 1 or 2 pillows under your upper body and head may help you breathe easier. Lifestyle changes · Do not smoke. Smoking can make a heart condition worse. If you need help quitting, talk to your doctor about stop-smoking programs and medicines. These can increase your chances of quitting for good. Quitting smoking may be the most important step you can take to protect your heart. · Limit alcohol to 2 drinks a day for men and 1 drink a day for women. Too much alcohol can cause health problems. · Avoid getting sick from colds and the flu. Get a pneumococcal vaccine shot. If you have had one before, ask your doctor whether you need another dose. Get a flu shot each year. If you must be around people with colds or the flu, wash your hands often. When should you call for help? Call 911 if you have symptoms of sudden heart failure such as: 
· You have severe trouble breathing. · You cough up pink, foamy mucus. · You have a new irregular or rapid heartbeat. Call your doctor now or seek immediate medical care if: 
· You have new or increased shortness of breath. · You are dizzy or lightheaded, or you feel like you may faint. · You have sudden weight gain, such as 3 pounds or more in 2 to 3 days. · You have increased swelling in your legs, ankles, or feet. · You are suddenly so tired or weak that you cannot do your usual activities. Watch closely for changes in your health, and be sure to contact your doctor if: 
· You develop new symptoms. Where can you learn more? Go to http://maggie-martine.info/. Enter W132 in the search box to learn more about \"Heart Failure: Care Instructions. \" Current as of: January 27, 2016 Content Version: 11.1 © 5848-4359 Table8. Care instructions adapted under license by [employer] (which disclaims liability or warranty for this information). If you have questions about a medical condition or this instruction, always ask your healthcare professional. [address] any warranty or liability for your use of this information. Discharge Orders None ACO Transitions of Care Introducing [facility] [employer] offers a voluntary care coordination program to provide high quality service and care to [facility] fee-for-service beneficiaries. Jf Estrada was designed to help you enhance your health and well-being through the following services: ? Transitions of Care  support for individuals who are transitioning from one care setting to another (example: Hospital to home). ? Chronic and Complex Care Coordination  support for individuals and caregivers of those with serious or chronic illnesses or with more than one chronic (ongoing) condition and those who take a number of different medications. If you meet specific medical criteria, a [facility]iatric [facility] [address] may call you directly to coordinate your care with your primary care physician and your other care providers.  
 
For questions about the [facility] programs, please, contact your physicians office. For general questions or additional information about Accountable Care Organizations: 
Please visit www.medicare.gov/acos. html or call 1-800-MEDICARE (1-793.636.2520) TTY users should call 8-881.179.6166. [email or web address] Announcement We are excited to announce that we are making your provider's discharge notes available to you in [email or web address]. You will see these notes when they are completed and signed by the physician that discharged you from your recent hospital stay. If you have any questions or concerns about any information you see in [email or web address], please call the Health Information Department where you were seen or reach out to your Primary Care Provider for more information about your plan of care. Introducing [facility] & HE[facility] SERVICES! Dear Sanjuanita Chung: Thank you for requesting a [email or web address] account. Our records indicate that you already have an active [email or web address] account. You can access your account anytime at [email or web address] Did you know that you can access your hospital and ER discharge instructions at any time in [email or web address]? You can also review all of your test results from your hospital stay or ER visit. Additional Information If you have questions, please visit the Frequently Asked Questions section of the [email or web address] website at [email or web address] Remember, [email or web address] is NOT to be used for urgent needs. For medical emergencies, dial 911. Now available from your iPhone and Android! General Information Please provide this summary of care documentation to your next provider. Patient Signature:  ____________________________________________________________ Date:  ____________________________________________________________  
  
Elmyshashi Rodriguez Provider Signature:  ____________________________________________________________ Date:  ____________________________________________________________

## 2017-03-23 NOTE — PROGRESS NOTES
TRANSFER - IN REPORT:    TRANSFER - IN REPORT:    Verbal report received from ALICE Martinez on Heydi Sees  being received from ED for routine progression of care      Report consisted of patients Situation, Background, Assessment and   Recommendations(SBAR). Information from the following reports was received: Kardex, ED Summary, MAR and Recent Results. Opportunity for questions and clarification was provided.

## 2017-03-23 NOTE — H&P
[facility] Cardiology History & Physical      Date of  Admission: 3/23/2017  5:07 PM     Primary Care Physician:  Dr. Flash Bhatia  Primary Cardiologist:  Dr. Coty Amador Physician:  Dr. Placido Pak    CC:  Shortness of breath and weakness    HPI:  Fer Morgan is a 80 y.o. male with a PMH of CAD with CABG in 2006, HTN, chronic systolic HF (last EF 93-70% 6/2016), GERD, COPD, O2 prn and [facility] Dual chamber ICD, who presented with progressive dyspnea, MARSHALL, LE edema, weakness, and 14lb wgt gain. In ED labs showed BNP of 3765, creatinine of 1.96 (baseline around 1.7), and CXR shows bilateral pleural effusions. He was hypoxic on arrival with sats in 80s and was placed on 2L NC. Upon exam the patient is using accessory muscles to breath and has +4 pitting edema. He denies chest pain, palpitations, nausea, vomiting, fever or chills. He received 40 mg of IV lasix in the ED.        Past Medical History:   Diagnosis Date    Arrhythmia     Arthritis     knees and hands    Atelectasis 2/5/2015    CAD (coronary artery disease)     CABG    Carotid stenosis without infarct     Chest trauma     right sided from lat, remote    CHF (congestive heart failure) ([address] 9/13/2013    Compensated      Chronic kidney disease     cysts in kidney    Chronic obstructive pulmonary disease (HCC)     Chronic systolic heart failure ([address] 5/30/2014    COPD (chronic obstructive pulmonary disease) ([facility] 9/13/2013    Coronary atherosclerosis of native coronary vessel     Elevated hemidiaphragm     right     GERD (gastroesophageal reflux disease) 9/13/2013    Heart failure ([facility] [address] [facility]     Heart failure, left, with LVEF >40% (HCC)     60%-65% on cath 11/2006    HTN (hypertension) 9/13/2013    Hyperlipidemia 9/13/2013    Hypertension     ICD (implantable cardioverter-defibrillator) in place 5/30/2014    [facility] - May 2014     Liver disease     Other chest pain 9/13/2013    Atypical, chest tightness     Pleural effusion, left 12/2006    Prostatic hypertrophy, benign 9/13/2013    Psychiatric disorder     anxiety    Restrictive lung disease     Rheumatic aortic insufficiency     Rhinitis 2/5/2015    S/P CABG (coronary artery bypass graft)       Past Surgical History:   Procedure Laterality Date    CARDIAC SURG PROCEDURE UNLIST  11/2006    cabg x 4    HX CATARACT REMOVAL      bilateral    HX PACEMAKER  5/2014    ICD       No Known Allergies   Social History     Social History    Marital status:      Spouse name: N/A    Number of children: N/A    Years of education: N/A     Occupational History    , retired      Social History Main Topics    Smoking status: Former Smoker     Packs/day: 1.00     Years: 30.00     Quit date: 1/1/1989    Smokeless tobacco: Never Used    Alcohol use 1.5 oz/week     3 Cans of beer per week      Comment: occasional    Drug use: No    Sexual activity: Yes     Partners: Female     Other Topics Concern    Not on file     Social History Narrative    He is  and has two children. He is a retired .      Family History   Problem Relation Age of Onset    Hypertension Other     Stroke Other      CVA    Other Other      renal failure        No current facility-administered medications for this encounter. Current Outpatient Prescriptions   Medication Sig    montelukast (SINGULAIR) 10 mg tablet 1 po q hs    lisinopril (PRINIVIL, ZESTRIL) 40 mg tablet TAKE ONE TABLET BY MOUTH ONCE DAILY    diphenhydrAMINE (BENADRYL) 25 mg capsule Take 25 mg by mouth nightly.  risperiDONE (RISPERDAL) 3 mg tablet Take  by mouth nightly.  budesonide (RHINOCORT AQUA) 32 mcg/actuation nasal spray 2 Sprays by Both Nostrils route.  ZINC ACETATE PO Take  by mouth.  furosemide (LASIX) 40 mg tablet Take 1 Tab by mouth two (2) times a day. (Patient taking differently: Take 40 mg by mouth daily.)    tamsulosin (FLOMAX) 0.4 mg capsule Take 1 Cap by mouth nightly.     albuterol (PROVENTIL VENTOLIN) 2.5 mg /3 mL (0.083 %) nebulizer solution 1 vial via nebulizer qid and prn;  Bill to medicare part B, dx COPD - J44.9  Indications: Chronic Obstructive Pulmonary Disease    OXYGEN-AIR DELIVERY SYSTEMS 3 L by Nasal route daily. 3 LPM Continuous every day and QHS    VITAMIN E, DL,TOCOPHERYL ACET, (VITAMIN E ACETATE) 400 unit cap capsule Take  by mouth daily.  ascorbic acid (VITAMIN C) 500 mg tablet Take  by mouth.  metoprolol succinate (TOPROL-XL) 50 mg XL tablet Take 1 Tab by mouth every twelve (12) hours.  VIT C/VIT E/LUTEIN/MIN/OMEGA-3 (OCUVITE PO) Take  by mouth.  albuterol (PROAIR HFA) 90 mcg/actuation inhaler 2 puffs qid prn    budesonide-formoterol (SYMBICORT) 160-4.5 mcg/actuation HFA inhaler 2 puffs bid, rinse mouth after use    Cholecalciferol, Vitamin D3, (VITAMIN D3) 1,000 unit cap Take  by mouth.  aspirin (BABY ASPIRIN) 81 mg chewable tablet Take 81 mg by mouth daily.  omeprazole (PRILOSEC) 20 mg capsule Take 20 mg by mouth two (2) times a day.  potassium chloride (KLOR-CON) 20 mEq packet Take 20 mEq by mouth daily.  simvastatin (ZOCOR) 20 mg tablet Take  by mouth nightly.  doxazosin (CARDURA) 4 mg tablet Take 1 mg by mouth nightly. Review of Systems    Review of Systems   Constitution: Positive for weakness. HENT: Negative. Eyes: Negative. Cardiovascular: Positive for dyspnea on exertion, leg swelling and orthopnea. Negative for chest pain. Respiratory: Positive for shortness of breath. Endocrine: Negative. Hematologic/Lymphatic: Negative. Skin: Negative. Musculoskeletal: Negative. Gastrointestinal: Negative. Genitourinary: Negative. Psychiatric/Behavioral: Negative. Allergic/Immunologic: Negative.            Subjective:     Visit Vitals    /77 (BP 1 Location: Right arm)    Pulse 88    Temp 98.1 °F (36.7 °C)    Resp 26    Ht 5' 5\" (1.651 m)    Wt 75.3 kg (166 lb)    SpO2 99%    BMI 27.62 kg/m2 Physical Exam   Constitutional: He is oriented to person, place, and time and well-developed, well-nourished, and in no distress. HENT:   Head: Normocephalic. Eyes: Pupils are equal, round, and reactive to light. Neck: Normal range of motion. Cardiovascular: Normal rate. An irregularly irregular rhythm present. Pulmonary/Chest: Effort normal. He has decreased breath sounds in the right middle field, the right lower field, the left middle field and the left lower field. Abdominal: Soft. Bowel sounds are normal.   Musculoskeletal: He exhibits edema. Neurological: He is alert and oriented to person, place, and time. Skin: Skin is warm and dry.    Psychiatric: Mood, memory, affect and judgment normal.       Cardiographics  Telemetry: AFIB  ECG: atrial fibrillation, rate 92  Echocardiogram: pending    Labs:   Recent Results (from the past 24 hour(s))   METABOLIC PANEL, BASIC    Collection Time: 03/23/17  2:00 PM   Result Value Ref Range    Sodium 138 136 - 145 mmol/L    Potassium 4.6 3.5 - 5.1 mmol/L    Chloride 99 98 - 107 mmol/L    CO2 37 (H) 21 - 32 mmol/L    Anion gap 2 (L) 7 - 16 mmol/L    Glucose 115 (H) 65 - 100 mg/dL    BUN 29 (H) 8 - 23 MG/DL    Creatinine 1.95 (H) 0.8 - 1.5 MG/DL    GFR est AA 43 (L) >60 ml/min/1.73m2    GFR est non-AA 35 (L) >60 ml/min/1.73m2    Calcium 8.8 8.3 - 10.4 MG/DL   BNP    Collection Time: 03/23/17  2:00 PM   Result Value Ref Range    BNP 3765 pg/mL   CBC WITH AUTOMATED DIFF    Collection Time: 03/23/17  5:17 PM   Result Value Ref Range    WBC 5.2 4.3 - 11.1 K/uL    RBC 4.47 4.23 - 5.67 M/uL    HGB 13.3 (L) 13.6 - 17.2 g/dL    HCT 41.6 41.1 - 50.3 %    MCV 93.1 79.6 - 97.8 FL    MCH 29.8 26.1 - 32.9 PG    MCHC 32.0 31.4 - 35.0 g/dL    RDW 14.9 (H) 11.9 - 14.6 %    PLATELET 145 (L) 413 - 450 K/uL    MPV 11.6 10.8 - 14.1 FL    DF AUTOMATED      NEUTROPHILS 78 43 - 78 %    LYMPHOCYTES 16 13 - 44 %    MONOCYTES 6 4.0 - 12.0 %    EOSINOPHILS 0 (L) 0.5 - 7.8 % BASOPHILS 0 0.0 - 2.0 %    IMMATURE GRANULOCYTES 0.0 0.0 - 5.0 %    ABS. NEUTROPHILS 4.0 1.7 - 8.2 K/UL    ABS. LYMPHOCYTES 0.8 0.5 - 4.6 K/UL    ABS. MONOCYTES 0.3 0.1 - 1.3 K/UL    ABS. EOSINOPHILS 0.0 0.0 - 0.8 K/UL    ABS. BASOPHILS 0.0 0.0 - 0.2 K/UL    ABS. IMM. GRANS. 0.0 0.0 - 0.5 K/UL   METABOLIC PANEL, COMPREHENSIVE    Collection Time: 03/23/17  5:17 PM   Result Value Ref Range    Sodium 137 136 - 145 mmol/L    Potassium 4.9 3.5 - 5.1 mmol/L    Chloride 99 98 - 107 mmol/L    CO2 33 (H) 21 - 32 mmol/L    Anion gap 5 (L) 7 - 16 mmol/L    Glucose 98 65 - 100 mg/dL    BUN 31 (H) 8 - 23 MG/DL    Creatinine 1.96 (H) 0.8 - 1.5 MG/DL    GFR est AA 42 (L) >60 ml/min/1.73m2    GFR est non-AA 35 (L) >60 ml/min/1.73m2    Calcium 8.7 8.3 - 10.4 MG/DL    Bilirubin, total 0.9 0.2 - 1.1 MG/DL    ALT (SGPT) 24 12 - 65 U/L    AST (SGOT) 23 15 - 37 U/L    Alk. phosphatase 70 50 - 136 U/L    Protein, total 6.9 6.3 - 8.2 g/dL    Albumin 3.4 3.2 - 4.6 g/dL    Globulin 3.5 2.3 - 3.5 g/dL    A-G Ratio 1.0 (L) 1.2 - 3.5     TROPONIN I    Collection Time: 03/23/17  5:17 PM   Result Value Ref Range    Troponin-I, Qt. <0.02 (L) 0.02 - 0.05 NG/ML       Patient has been seen and examined by Dr. Taylor Pinzon and he agrees with the following assessment and plan:     Assessment/Plan:          Acute on chronic systolic (congestive) heart failure -- admit patient to telemetry. Start lasix drip at 10mg/hr. Continue metoprolol XL and lisinopril. Strict I/Os and daily weights. Bilateral pleural effusions--  Consult pulmonary to see if thoracentesis is needed. Hyperlipidemia -- continue statin      HTN (hypertension) -- continue metoprolol XL and lisinopril. Monitor BP and titrate      CAD (coronary artery disease) -- continue asa, bb, ACE, and statin.   No angina      Overview: 11/2006:  LIMA-LAD, SVG-Dx, SVG-OM, SVG-RCA      ICD (implantable cardioverter-defibrillator) in place-- denies any shocks      Overview: [facility] - May 2014      CKD (chronic kidney disease) stage 3, GFR 30-59 ml/min-- monitor renal function daily    Tao Reynoso NP  3/23/2017 6:46 PM    Patient seen and examined by me. Agree with above note by physician extender.   Key findings are:  Acute on chronic sCHF  CV- RRR without MRG  Lungs- Clear bilaterally  Abdomen- soft, non-tender, normal bowel sounds  Extremities- 2+ edema    Plan:  IV lasix and ask pulmonary to assess for pleural effusions      Isaiah Machado MD  '

## 2017-03-23 NOTE — PROGRESS NOTES
Please let him know that this Needs to be managed in hospital, BNP is very high, please let cardiology know as courtesy since he is seeing them, creatinine is higher than last check as well, so this is problematic for diuresis.

## 2017-03-23 NOTE — IP AVS SNAPSHOT
Current Discharge Medication List  
  
START taking these medications Dose & Instructions Dispensing Information Comments Morning Noon Evening Bedtime  
 guaiFENesin 1,200 mg Ta12 ER tablet Commonly known as:  [employer] Mar[employer] Your last dose was: Your next dose is:    
   
   
 Dose:  1200 mg Take 1 Tab by mouth every twelve (12) hours. Quantity:  60 Tab Refills:  1 CONTINUE these medications which have CHANGED Dose & Instructions Dispensing Information Comments Morning Noon Evening Bedtime  
 furosemide 80 mg tablet Commonly known as:  LASIX What changed:   
- medication strength 
- how much to take Your last dose was: Your next dose is:    
   
   
 Dose:  80 mg Take 1 Tab by mouth two (2) times a day. Quantity:  60 Tab Refills:  11 CONTINUE these medications which have NOT CHANGED Dose & Instructions Dispensing Information Comments Morning Noon Evening Bedtime * albuterol 90 mcg/actuation inhaler Commonly known as:  PROAIR HFA Your last dose was: Your next dose is:    
   
   
 2 puffs qid prn Quantity:  1 Inhaler Refills:  11  
     
   
   
   
  
 * albuterol 2.5 mg /3 mL (0.083 %) nebulizer solution Commonly known as:  PROVENTIL VENTOLIN Your last dose was: Your next dose is:    
   
   
 1 vial via nebulizer qid and prn;  Bill to medicare part B, dx COPD - J44.9  Indications: Chronic Obstructive Pulmonary Disease Quantity:  120 Each Refills:  11 Dispense #120 vials  
    
   
   
   
  
 ascorbic acid (vitamin C) 500 mg tablet Commonly known as:  VITAMIN C Your last dose was: Your next dose is: Take  by mouth. Refills:  0  
     
   
   
   
  
 BABY ASPIRIN 81 mg chewable tablet Generic drug:  aspirin Your last dose was:     
   
Your next dose is:    
   
   
 Dose:  81 mg  
 Take 81 mg by mouth daily. Refills:  0  
     
   
   
   
  
 BENADRYL 25 mg capsule Generic drug:  diphenhydrAMINE Your last dose was: Your next dose is:    
   
   
 Dose:  25 mg Take 25 mg by mouth nightly. Refills:  0  
     
   
   
   
  
 budesonide-formoterol 160-4.5 mcg/actuation HFA inhaler Commonly known as:  SYMBICORT Your last dose was: Your next dose is:    
   
   
 2 puffs bid, rinse mouth after use Quantity:  1 Inhaler Refills:  11  
     
   
   
   
  
 doxazosin 4 mg tablet Commonly known as:  CARDURA Your last dose was: Your next dose is:    
   
   
 Dose:  1 mg Take 1 mg by mouth nightly. Refills:  0  
     
   
   
   
  
 lisinopril 40 mg tablet Commonly known as:  Anahi Needy Your last dose was: Your next dose is: TAKE ONE TABLET BY MOUTH ONCE DAILY Quantity:  30 Tab Refills:  0  
     
   
   
   
  
 metoprolol succinate 50 mg XL tablet Commonly known as:  TOPROL-XL Your last dose was: Your next dose is:    
   
   
 Dose:  50 mg Take 1 Tab by mouth every twelve (12) hours. Quantity:  180 Tab Refills:  3  
     
   
   
   
  
 montelukast 10 mg tablet Commonly known as:  SINGULAIR Your last dose was: Your next dose is:    
   
   
 1 po q hs Quantity:  30 Tab Refills:  11  
     
   
   
   
  
 OCUVITE PO Your last dose was: Your next dose is: Take  by mouth. Refills:  0  
     
   
   
   
  
 omeprazole 20 mg capsule Commonly known as:  PRILOSEC Your last dose was: Your next dose is:    
   
   
 Dose:  20 mg Take 20 mg by mouth two (2) times a day. Refills:  0 OXYGEN-AIR DELIVERY SYSTEMS Your last dose was: Your next dose is:    
   
   
 Dose:  3 L  
3 L by Nasal route daily. 3 LPM Continuous every day and QHS Refills:  0 RHINOCORT AQUA 32 mcg/actuation nasal spray Generic drug:  budesonide Your last dose was: Your next dose is:    
   
   
 Dose:  2 Spray 2 Sprays by Both Nostrils route. Refills:  0  
     
   
   
   
  
 risperiDONE 3 mg tablet Commonly known as:  RisperDAL Your last dose was: Your next dose is: Take  by mouth nightly. Refills:  0  
     
   
   
   
  
 simvastatin 20 mg tablet Commonly known as:  ZOCOR Your last dose was: Your next dose is: Take  by mouth nightly. Refills:  0  
     
   
   
   
  
 tamsulosin 0.4 mg capsule Commonly known as:  FLOMAX Your last dose was: Your next dose is:    
   
   
 Dose:  0.4 mg Take 1 Cap by mouth nightly. Quantity:  30 Cap Refills:  6 VITAMIN D3 1,000 unit Cap Generic drug:  cholecalciferol Your last dose was: Your next dose is: Take  by mouth. Refills:  0  
     
   
   
   
  
 vitamin E acetate 400 unit Cap capsule Your last dose was: Your next dose is: Take  by mouth daily. Refills:  0 ZINC ACETATE PO Your last dose was: Your next dose is: Take  by mouth. Refills:  0  
     
   
   
   
  
 * Notice: This list has 2 medication(s) that are the same as other medications prescribed for you. Read the directions carefully, and ask your doctor or other care provider to review them with you. STOP taking these medications   
 potassium chloride 20 mEq packet Commonly known as:  KLOR-CON Where to Get Your Medications Information on where to get these meds will be given to you by the nurse or doctor. ! Ask your nurse or doctor about these medications  
  furosemide 80 mg tablet  
 guaiFENesin 1,200 mg Ta12 ER tablet

## 2017-03-23 NOTE — ED PROVIDER NOTES
HPI Comments: Patient has a history of CHF and has had shortness of breath for the last several days. Patient does take 20 mg of Lasix daily. He does have a history of chronic renal insufficiency so they have been reluctant to increase his dose. But occasionally he does take 2 for several days when he gets exacerbated. He does report several pounds of weight gain and had an outpatient BNP done today that was elevated and he was called and told to come in to the emergency department for evaluation. Patient states he's had some shortness of breath but denies any pain and states that he currently feels okay. He wears oxygen on and off and keeps it on continuously at night. Patient is a 80 y.o. male presenting with shortness of breath. The history is provided by the patient. Shortness of Breath   This is a recurrent problem. Pertinent negatives include no fever, no cough, no wheezing, no chest pain, no vomiting and no abdominal pain.         Past Medical History:   Diagnosis Date    Arrhythmia     Arthritis     knees and hands    Atelectasis 2/5/2015    CAD (coronary artery disease)     CABG    Carotid stenosis without infarct     Chest trauma     right sided from [facility]    CHF (congestive heart failure) ([address] 9/13/2013    Compensated      Chronic kidney disease     cysts in kidney    Chronic obstructive pulmonary disease (HCC)     Chronic systolic heart failure ([address] 5/30/2014    COPD (chronic obstructive pulmonary disease) ([address] 9/13/2013    Coronary atherosclerosis of native coronary vessel     Elevated hemidiaphragm     right     GERD (gastroesophageal reflux disease) 9/13/2013    Heart failure ([address]     Heart failure, left, with LVEF >40% (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    [facility] May [facility]     Liver disease     Other chest pain 9/13/2013    Atypical, chest tightness     Pleural effusion, left 12/2006    Prostatic hypertrophy, benign 9/13/2013    Psychiatric disorder     anxiety    Restrictive lung disease     Rheumatic aortic insufficiency     Rhinitis 2/5/2015    S/P CABG (coronary artery bypass graft)        Past Surgical History:   Procedure Laterality Date    CARDIAC SURG PROCEDURE UNLIST  11/2006    cabg x 4    HX CATARACT REMOVAL      bilateral    HX PACEMAKER  5/2014    ICD         Family History:   Problem Relation Age of Onset    Hypertension Other     Stroke Other      CVA    Other Other      renal failure       Social History     Social History    Marital status:      Spouse name: N/A    Number of children: N/A    Years of education: N/A     Occupational History    , retired      Social History Main Topics    Smoking status: Former Smoker     Packs/day: 1.00     Years: 30.00     Quit date: 1/1/1989    Smokeless tobacco: Never Used    Alcohol use 1.5 oz/week     3 Cans of beer per week      Comment: occasional    Drug use: No    Sexual activity: Yes     Partners: Female     Other Topics Concern    Not on file     Social History Narrative    He is  and has two children. He is a retired .          ALLERGIES: Review of patient's allergies indicates no known allergies. Review of Systems   Constitutional: Negative for chills and fever. Respiratory: Positive for shortness of breath. Negative for cough, choking, chest tightness, wheezing and stridor. Cardiovascular: Negative for chest pain and palpitations. Gastrointestinal: Negative for abdominal pain, diarrhea, nausea and vomiting. Skin: Negative. All other systems reviewed and are negative.       Vitals:    03/23/17 1704   BP: 155/81   Pulse: 93   Resp: 20   Temp: 98.1 °F (36.7 °C)   SpO2: (!) 87%   Weight: 75.3 kg (166 lb)   Height: 5' 5\" (1.651 m)            Physical Exam   Constitutional: He is oriented to person, place, and time. He appears well-developed and well-nourished. No distress. HENT:   Head: Normocephalic and atraumatic. Eyes: Conjunctivae are normal. No scleral icterus. Neck: Normal range of motion. Neck supple. Cardiovascular: Normal rate, regular rhythm and normal heart sounds. Pulmonary/Chest: Effort normal and breath sounds normal. No stridor. No respiratory distress. He has no wheezes. He has no rales. He exhibits no tenderness. Abdominal: Soft. He exhibits no distension. There is no tenderness. There is no rebound and no guarding. Neurological: He is alert and oriented to person, place, and time. No focal weakness   Skin: Skin is warm and dry. No rash noted. He is not diaphoretic. Psychiatric: He has a normal mood and affect. His behavior is normal.   Nursing note and vitals reviewed. MDM  Number of Diagnoses or Management Options  Diagnosis management comments: Patient has elevated BNP as well as new pleural effusions given that he has chronic renal insufficiency felt he would be best benefit with inpatient admission I paged cardiology for further care and will give him Lasix IV now. Sammi Givens MD; 3/23/2017 @6:25 PM Voice dictation software was used during the making of this note. This software is not perfect and grammatical and other typographical errors may be present.   This note has not been proofread for errors.  ===================================================================        Amount and/or Complexity of Data Reviewed  Clinical lab tests: ordered and reviewed (Results for orders placed or performed during the hospital encounter of 03/23/17  -CBC WITH AUTOMATED DIFF       Result                                            Value                         Ref Range                       WBC                                               5.2                           4.3 - 11.1 K/uL                 RBC                                               4.47 4.23 - 5.67 M/uL                HGB                                               13.3 (L)                      13.6 - 17.2 g/dL                HCT                                               41.6                          41.1 - 50.3 %                   MCV                                               93.1                          79.6 - 97.8 FL                  MCH                                               29.8                          26.1 - 32.9 PG                  MCHC                                              32.0                          31.4 - 35.0 g/dL                RDW                                               14.9 (H)                      11.9 - 14.6 %                   PLATELET                                          129 (L)                       150 - 450 K/uL                  MPV                                               11.6                          10.8 - 14.1 FL                  DF                                                AUTOMATED                                                     NEUTROPHILS                                       78                            43 - 78 %                       LYMPHOCYTES                                       16                            13 - 44 %                       MONOCYTES                                         6                             4.0 - 12.0 %                    EOSINOPHILS                                       0 (L)                         0.5 - 7.8 %                     BASOPHILS                                         0                             0.0 - 2.0 %                     IMMATURE GRANULOCYTES                             0.0                           0.0 - 5.0 %                     ABS. NEUTROPHILS                                  4.0                           1.7 - 8.2 K/UL                  ABS.  LYMPHOCYTES                                  0.8                           0.5 - 4.6 K/UL ABS. MONOCYTES                                    0.3                           0.1 - 1.3 K/UL                  ABS. EOSINOPHILS                                  0.0                           0.0 - 0.8 K/UL                  ABS. BASOPHILS                                    0.0                           0.0 - 0.2 K/UL                  ABS. IMM.  GRANS.                                  0.0                           0.0 - 0.5 K/UL             -METABOLIC PANEL, COMPREHENSIVE       Result                                            Value                         Ref Range                       Sodium                                            137                           136 - 145 mmol/L                Potassium                                         4.9                           3.5 - 5.1 mmol/L                Chloride                                          99                            98 - 107 mmol/L                 CO2                                               33 (H)                        21 - 32 mmol/L                  Anion gap                                         5 (L)                         7 - 16 mmol/L                   Glucose                                           98                            65 - 100 mg/dL                  BUN                                               31 (H)                        8 - 23 MG/DL                    Creatinine                                        1.96 (H)                      0.8 - 1.5 MG/DL                 GFR est AA                                        42 (L)                        >60 ml/min/1.73m2               GFR est non-AA                                    35 (L)                        >60 ml/min/1.73m2               Calcium                                           8.7                           8.3 - 10.4 MG/DL                Bilirubin, total                                  0.9                           0.2 - 1.1 MG/DL                 ALT (SGPT)                                        24                            12 - 65 U/L                     AST (SGOT)                                        23                            15 - 37 U/L                     Alk. phosphatase                                  70                            50 - 136 U/L                    Protein, total                                    6.9                           6.3 - 8.2 g/dL                  Albumin                                           3.4                           3.2 - 4.6 g/dL                  Globulin                                          3.5                           2.3 - 3.5 g/dL                  A-G Ratio                                         1.0 (L)                       1.2 - 3.5                  -TROPONIN I       Result                                            Value                         Ref Range                       Troponin-I, Qt.                                   <0.02 (L)                     0.02 - 0.05 NG/ML         )  Tests in the radiology section of CPT®: ordered and reviewed (Xr Chest Pa Lat    Result Date: 3/23/2017  Chest X-ray INDICATION:  Increasing shortness of breath PA and lateral views of the chest were obtained. FINDINGS: There are increased bilateral pleural effusions, right probably larger than left. The upper lungs are clear. There is stable cardia megaly. Sternotomy changes and pacemaker are present.       IMPRESSION: Increased bilateral pleural effusions    )      ED Course       Procedures

## 2017-03-24 NOTE — DISCHARGE INSTRUCTIONS
Heart Failure: Care Instructions  Your Care Instructions    Heart failure occurs when your heart does not pump as much blood as the body needs. Failure does not mean that the heart has stopped pumping but rather that it is not pumping as well as it should. Over time, this causes fluid buildup in your lungs and other parts of your body. Fluid buildup can cause shortness of breath, fatigue, swollen ankles, and other problems. By taking medicines regularly, reducing sodium (salt) in your diet, checking your weight every day, and making lifestyle changes, you can feel better and live longer. Follow-up care is a key part of your treatment and safety. Be sure to make and go to all appointments, and call your doctor if you are having problems. It's also a good idea to know your test results and keep a list of the medicines you take. How can you care for yourself at home? Medicines  · Be safe with medicines. Take your medicines exactly as prescribed. Call your doctor if you think you are having a problem with your medicine. · Do not take any vitamins, over-the-counter medicine, or herbal products without talking to your doctor first. Carline Segovianirali not take ibuprofen (Advil or Motrin) and naproxen (Aleve) without talking to your doctor first. They could make your heart failure worse. · You may be taking some of the following medicine. ¨ Beta-blockers can slow heart rate, decrease blood pressure, and improve your condition. Taking a beta-blocker may lower your chance of needing to be hospitalized. ¨ Angiotensin-converting enzyme inhibitors (ACEIs) reduce the heart's workload, lower blood pressure, and reduce swelling. Taking an ACEI may lower your chance of needing to be hospitalized again. ¨ Angiotensin II receptor blockers (ARBs) work like ACEIs. Your doctor may prescribe them instead of ACEIs. ¨ Diuretics, also called water pills, reduce swelling.   ¨ Potassium supplements replace this important mineral, which is sometimes lost with diuretics. ¨ Aspirin and other blood thinners prevent blood clots, which can cause a stroke or heart attack. You will get more details on the specific medicines your doctor prescribes. Diet  · Your doctor may suggest that you limit sodium to 2,000 milligrams (mg) a day or less. That is less than 1 teaspoon of salt a day, including all the salt you eat in cooking or in packaged foods. People get most of their sodium from processed foods. Fast food and restaurant meals also tend to be very high in sodium. · Ask your doctor how much liquid you can drink each day. You may have to limit liquids. Weight  · Weigh yourself without clothing at the same time each day. Record your weight. Call your doctor if you gain more than 3 pounds in 2 to 3 days. A sudden weight gain may mean that your heart failure is getting worse. Activity level  · Start light exercise (if your doctor says it is okay). Even if you can only do a small amount, exercise will help you get stronger, have more energy, and manage your weight and your stress. Walking is an easy way to get exercise. Start out by walking a little more than you did before. Bit by bit, increase the amount you walk. · When you exercise, watch for signs that your heart is working too hard. You are pushing yourself too hard if you cannot talk while you are exercising. If you become short of breath or dizzy or have chest pain, stop, sit down, and rest.  · If you feel \"wiped out\" the day after you exercise, walk slower or for a shorter distance until you can work up to a better pace. · Get enough rest at night. Sleeping with 1 or 2 pillows under your upper body and head may help you breathe easier. Lifestyle changes  · Do not smoke. Smoking can make a heart condition worse. If you need help quitting, talk to your doctor about stop-smoking programs and medicines. These can increase your chances of quitting for good.  Quitting smoking may be the most important step you can take to protect your heart. · Limit alcohol to 2 drinks a day for men and 1 drink a day for women. Too much alcohol can cause health problems. · Avoid getting sick from colds and the flu. Get a pneumococcal vaccine shot. If you have had one before, ask your doctor whether you need another dose. Get a flu shot each year. If you must be around people with colds or the flu, wash your hands often. When should you call for help? Call 911 if you have symptoms of sudden heart failure such as:  · You have severe trouble breathing. · You cough up pink, foamy mucus. · You have a new irregular or rapid heartbeat. Call your doctor now or seek immediate medical care if:  · You have new or increased shortness of breath. · You are dizzy or lightheaded, or you feel like you may faint. · You have sudden weight gain, such as 3 pounds or more in 2 to 3 days. · You have increased swelling in your legs, ankles, or feet. · You are suddenly so tired or weak that you cannot do your usual activities. Watch closely for changes in your health, and be sure to contact your doctor if:  · You develop new symptoms. Where can you learn more? Go to http://maggie-martine.info/. Enter G625 in the search box to learn more about \"Heart Failure: Care Instructions. \"  Current as of: January 27, 2016  Content Version: 11.1  © 1815-1096 [employer]. Care instructions adapted under license by [employer] (which disclaims liability or warranty for this information). If you have questions about a medical condition or this instruction, always ask your healthcare professional. [place] [address] any warranty or liability for your use of this information.

## 2017-03-24 NOTE — PROGRESS NOTES
Problem: Heart Failure: Day 2  Goal: Diagnostic Test/Procedures  Outcome: Progressing Towards Goal  Echocardiogram today. Goal: Discharge Planning  Outcome: Progressing Towards Goal  PPD/PT/OT/SW consults pending short term rehab at discharge. Goal: Medications  Outcome: Progressing Towards Goal  Lasix gtt infusing 10mg/hr with good urine output.

## 2017-03-24 NOTE — PROGRESS NOTES
Problem: Mobility Impaired (Adult and Pediatric)  Goal: *Acute Goals and Plan of Care (Insert Text)  1. Mr. Sejal Leger will perform supine to sit and sit to supine independently in 7 days. 2. Mr. Sejal Leger will perform sit to stand and bed to chair independently in 7 days. 3. Mr. Sejal Leger will perform gait with least restrictive device 250 ft independently in 7 days. 4. Mr. Sejal Leger will perform up and down 4 steps with rail independently in 7 days. PHYSICAL THERAPY: INITIAL ASSESSMENT 3/24/2017  INPATIENT: Hospital Day: 2  [place] / Product Type: Medicare /      NAME/AGE/GENDER: Parrish Chapman is a 80 y.o. male     PRIMARY DIAGNOSIS: Acute on chronic systolic (congestive) heart failure (HCC) Acute on chronic systolic (congestive) heart failure (HCC) Acute on chronic systolic (congestive) heart failure (HCC)        ICD-10: Treatment Diagnosis:       · Generalized Muscle Weakness (M62.81)  · Difficulty in walking, Not elsewhere classified (R26.2)   Precaution/Allergies:  Review of patient's allergies indicates no known allergies. ASSESSMENT:      Mr. Sejal Leger presents with decreased mobility and decreased gait. He lost his wife less than a month ago. He has been living by himself. Currently he is requiring contact guard to min assist.  His activity tolerance is quite low. Mr. Sejal Leger would benefit from skilled PT to maximize his rehab potential.  Zhao Marie daughter tells me that there is strong chance that his daughter will have him come live with her but she just moved into a new house and needs time to get set up. In the mean time mr. Sejal Leger would benefit from a short post acute rehab stay. This section established at most recent assessment   PROBLEM LIST (Impairments causing functional limitations):  1. Decreased Strength  2. Decreased Transfer Abilities  3. Decreased Ambulation Ability/Technique  4.  Decreased Activity Tolerance    INTERVENTIONS PLANNED: (Benefits and precautions of physical therapy have been discussed with the patient.)  1. Bed Mobility  2. Therapeutic Activites  3. Therapeutic Exercise/Strengthening  4. Transfer Training      TREATMENT PLAN: Frequency/Duration: 3 times a week for duration of hospital stay  Rehabilitation Potential For Stated Goals: GOOD      RECOMMENDED REHABILITATION/EQUIPMENT: (at time of discharge pending progress): Continue Skilled Therapy and Rehab. HISTORY:   History of Present Injury/Illness (Reason for Referral): Kiarra Mendez is a 80 y.o. male with a PMH of CAD with CABG in 2006, HTN, chronic systolic HF (last EF 81-86% 6/2016), GERD, COPD, O2 prn and [facility] Dual chamber ICD, who presented with progressive dyspnea, MARSHALL, LE edema, weakness, and 14lb wgt gain. In ED labs showed BNP of 3765, creatinine of 1.96 (baseline around 1.7), and CXR shows bilateral pleural effusions. He was hypoxic on arrival with sats in 80s and was placed on 2L NC. Upon exam the patient is using accessory muscles to breath and has +4 pitting edema. He denies chest pain, palpitations, nausea, vomiting, fever or chills. He received 40 mg of IV lasix in the ED. Past Medical History/Comorbidities:   Mr. Eddie Bey  has a past medical history of Arrhythmia; Arthritis; Atelectasis (2/5/2015); CAD (coronary artery disease); Carotid stenosis without infarct; Chest trauma; CHF (congestive heart failure) ([address] (9/13/2013); Chronic kidney disease; Chronic obstructive pulmonary disease ([address] Chronic systolic heart failure (HCC) (5/30/2014); COPD (chronic obstructive pulmonary disease) ([address] (9/13/2013); Coronary atherosclerosis of native coronary vessel; Elevated hemidiaphragm; GERD (gastroesophageal reflux disease) (9/13/2013); Heart failure ([address] Heart failure, left, with LVEF >40% ([address] HTN (hypertension) (9/13/2013); Hyperlipidemia (9/13/2013); Hypertension; 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 Mr. Beverly Harrison  has a past surgical history that includes cardiac surg procedure unlist (11/2006); pacemaker (5/2014); and cataract removal.  Social History/Living Environment:   Home Environment: Private residence  # Steps to Enter: 4  One/Two Story Residence: One story  Living Alone: Yes  Support Systems: Child(erick)  Patient Expects to be Discharged to[de-identified] Private residence  Current DME Used/Available at Home: Oxygen, portable  Tub or Shower Type: Tub/Shower combination  Prior Level of Function/Work/Activity:  independent  age, CHF, oxygen use   Number of Personal Factors/Comorbidities that affect the Plan of Care: 3+: HIGH COMPLEXITY   EXAMINATION:   Most Recent Physical Functioning:   Gross Assessment:  AROM: Generally decreased, functional  Strength: Generally decreased, functional               Posture:  Posture (WDL): Within defined limits  Balance:  Sitting: Intact  Standing: Impaired  Standing - Static: Fair  Standing - Dynamic : Fair Bed Mobility:  Rolling: Supervision  Supine to Sit: Supervision  Wheelchair Mobility:     Transfers:  Sit to Stand: Contact guard assistance  Stand to Sit: Contact guard assistance  Gait:     Base of Support: Widened  Step Length: Right shortened;Left shortened  Distance (ft): 25 Feet (ft)  Assistive Device:  (would benefit from rolling walker)  Ambulation - Level of Assistance: Contact guard assistance;Minimal assistance  Interventions: Manual cues; Safety awareness training;Verbal cues; Tactile cues       Body Structures Involved:  1. Muscles Body Functions Affected:  1. Movement Related Activities and Participation Affected:  1. Mobility  2.  Domestic Life   Number of elements that affect the Plan of Care: 4+: HIGH COMPLEXITY CLINICAL PRESENTATION:   Presentation: Evolving clinical presentation with changing clinical characteristics: MODERATE COMPLEXITY   CLINICAL DECISION MAKIN [facility] Mobility Inpatient Short Form  How much difficulty does the patient currently have. .. Unable A Lot A Little None   1. Turning over in bed (including adjusting bedclothes, sheets and blankets)? [ ] 1   [ ] 2   [X] 3   [ ] 4   2. Sitting down on and standing up from a chair with arms ( e.g., wheelchair, bedside commode, etc.)   [ ] 1   [ ] 2   [X] 3   [ ] 4   3. Moving from lying on back to sitting on the side of the bed? [ ] 1   [ ] 2   [X] 3   [ ] 4   How much help from another person does the patient currently need. .. Total A Lot A Little None   4. Moving to and from a bed to a chair (including a wheelchair)? [ ] 1   [ ] 2   [X] 3   [ ] 4   5. Need to walk in hospital room? [ ] 1   [ ] 2   [X] 3   [ ] 4   6. Climbing 3-5 steps with a railing? [ ] 1   [ ] 2   [X] 3   [ ] 4   © , Trustees of [address] [place] [address], under license to [employer]. All rights reserved    Score:  Initial: 18 Most Recent: X (Date: -- )     Interpretation of Tool:  Represents activities that are increasingly more difficult (i.e. Bed mobility, Transfers, Gait). Score 24 23 22-20 19-15 14-10 9-7 6       Modifier CH CI CJ CK CL CM CN         · Mobility - Walking and Moving Around:               - CURRENT STATUS:    CK - 40%-59% impaired, limited or restricted               - GOAL STATUS:           CK - 40%-59% impaired, limited or restricted               - D/C STATUS:                       CK - 40%-59% impaired, limited or restricted  [place] / [place] / Product Type: Medicare /       Medical Necessity:     · Patient is expected to demonstrate progress in functional technique to increase independence with mobility and gait. .  Reason for Services/Other Comments:  · Patient continues to require present interventions due to patient's inability to function at baseline. Use of outcome tool(s) and clinical judgement create a POC that gives a: Questionable prediction of patient's progress: MODERATE COMPLEXITY                 TREATMENT:   (In addition to Assessment/Re-Assessment sessions the following treatments were rendered)   Pre-treatment Symptoms/Complaints:  none  Pain: Initial:   Pain Intensity 1: 0  Post Session:  0/10      Assessment/Reassessment only, no treatment provided today     Braces/Orthotics/Lines/Etc:   · IV  · O2 Device: Nasal cannula  Treatment/Session Assessment:    · Response to Treatment:  good  · Interdisciplinary Collaboration:  · Registered Nurse  · After treatment position/precautions:  · Up in chair  · Call light within reach  · RN notified  · Compliance with Program/Exercises: Will assess as treatment progresses. · Recommendations/Intent for next treatment session: \"Next visit will focus on advancements to more challenging activities and reduction in assistance provided\".   Total Treatment Duration:  PT Patient Time In/Time Out  Time In: 1040  Time Out: [address], PT

## 2017-03-24 NOTE — PROGRESS NOTES
Verbal bedside report given to oncoming RN, Kristine munoz. Patient's situation, background, assessment and recommendations provided. Opportunity for questions provided. Oncoming RN assumed care of patient.

## 2017-03-24 NOTE — PROGRESS NOTES
Bedside and Verbal shift change report given to self (oncoming nurse) by Grace Enciso (offgoing nurse). Report included the following information SBAR, Kardex, MAR and Recent Results.

## 2017-03-24 NOTE — PROGRESS NOTES
Dual skin assessment completed, bruise noted to right back, rash left ankle, scattered scabs on right foot, scar midline chest, sacrum is red and blanchable, dime size red spot in gluteal cleft, Allevyn placed.

## 2017-03-24 NOTE — PROGRESS NOTES
Verbal bedside report received from Jim Oro RN. Assumed care of patient. Lasix IV drip verified at bedside with outgoing RN.

## 2017-03-24 NOTE — PROGRESS NOTES
TRANSFER - OUT REPORT:    Verbal report given to Kiel Paul RN on Berenice Callahan  being transferred to [address] [place] [address] for ordered procedure       Report consisted of patients Situation, Background, Assessment and Recommendations(SBAR). Information from the following report(s) SBAR, Kardex, Intake/Output, MAR, Accordion, Recent Results, Med Rec Status and Cardiac Rhythm SB was reviewed with the receiving nurse. Opportunity for questions and clarification was provided.

## 2017-03-24 NOTE — PROGRESS NOTES
3/24/2017 7:01 AM    Admit Date: 3/23/2017    Admit Diagnosis: Acute on chronic systolic (congestive) heart failure (HCC)      Subjective:    Patient admitted with systolic heart failure. On lasix gtt.  Doing better    Objective:      Visit Vitals    /66 (BP 1 Location: Right arm, BP Patient Position: At rest)    Pulse 78    Temp 97.6 °F (36.4 °C)    Resp 17    Ht 5' 5\" (1.651 m)    Wt 74.4 kg (164 lb)    SpO2 99%    BMI 27.29 kg/m2       ROS:  General ROS: negative for - chills  Hematological and Lymphatic ROS: negative for - blood clots or jaundice  Respiratory ROS: no cough, shortness of breath, or wheezing  Cardiovascular ROS: no chest pain or dyspnea on exertion  Gastrointestinal ROS: no abdominal pain, change in bowel habits, or black or bloody stools  Neurological ROS: no TIA or stroke symptoms    Physical Exam:    Physical Examination: General appearance - alert, well appearing, and in no distress  Mental status - alert, oriented to person, place, and time  Eyes - pupils equal and reactive, extraocular eye movements intact  Neck/lymph - supple, no significant adenopathy  Chest/CV - clear to auscultation, no wheezes, rales or rhonchi, symmetric air entry  Heart - normal rate, regular rhythm, normal S1, S2, no murmurs, rubs, clicks or gallops  Abdomen/GI - soft, nontender, nondistended, no masses or organomegaly  Musculoskeletal - no joint tenderness, deformity or swelling  Extremities - peripheral pulses normal, no pedal edema, no clubbing or cyanosis  Skin - normal coloration and turgor, no rashes, no suspicious skin lesions noted    Current Facility-Administered Medications   Medication Dose Route Frequency    doxazosin (CARDURA) tablet 1 mg  1 mg Oral QHS    aspirin chewable tablet 81 mg  81 mg Oral DAILY    simvastatin (ZOCOR) tablet 20 mg  20 mg Oral QHS    lisinopril (PRINIVIL, ZESTRIL) tablet 40 mg  40 mg Oral DAILY    risperiDONE (RisperDAL) tablet 3 mg  3 mg Oral QHS    tamsulosin (FLOMAX) capsule 0.4 mg  0.4 mg Oral QHS    albuterol (PROVENTIL HFA, VENTOLIN HFA, PROAIR HFA) inhaler 1 Puff  1 Puff Inhalation Q6H PRN    metoprolol succinate (TOPROL-XL) XL tablet 50 mg  50 mg Oral Q12H    pantoprazole (PROTONIX) tablet 40 mg  40 mg Oral ACB    furosemide (LASIX) 100 mg in 0.9% sodium chloride 100 mL infusion  10 mg/hr IntraVENous CONTINUOUS    budesonide (PULMICORT) 500 mcg/2 ml nebulizer suspension  500 mcg Nebulization BID RT    And    albuterol CONCENTRATE 2.5mg/0.5 mL neb soln  2.5 mg Nebulization Q6H RT       Data Review:   @LABRCNT(Na,K,BUN,CREA,WBC,HGB,HCT,PLT,INR,TRP,TCHOL*,Triglyceride*,LDL*,LDLCPOC HDL*,HDL])@    TELEMETRY: nsr    Assessment/Plan:     Principal Problem:    Acute on chronic systolic (congestive) heart failure (HCC) (12/26/2016) on lasix gtt. Improving/ continue    Active Problems:    Hyperlipidemia (9/13/2013)The current medical regimen is effective;  continue present plan and medications. HTN (hypertension) (9/13/2013)The current medical regimen is effective;  continue present plan and medications. 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         Overview: [facility] - May 2014      CKD (chronic kidney disease) stage 3, GFR 30-59 ml/min (12/26/2016)      Pleural effusion, bilateral (12/26/2016)          Nazario Arellano MD

## 2017-03-24 NOTE — PROGRESS NOTES
Verbal bedside report given to Milo Arenas, oncoming RN. Patient's situation, background, assessment and recommendations provided. Opportunity for questions provided. Oncoming RN assumed care of patient. Lasix IV drip verified at bedside with oncoming RN.

## 2017-03-25 NOTE — PROGRESS NOTES
Call to [facility], [phone]. Left message with spouse to return call to discuss d/c POC for possible rehab. PPD already placed and PT seeing pt.

## 2017-03-25 NOTE — PROGRESS NOTES
Anjali Gil returned call to CM to discuss d/c POC. Pt lives alone as his wife passed 2 weeks ago. He also has lost 2 sons previously. Anjali Gil is only surviving child. He was independent prior to admission, but daughter feels like he did not really return to full function since hospitalization in Dec 2016. He did not use anything to ambulate with. He has O2 at home for HS. He does have PCP, Dr. Shan Spann,  and can get Rx with drug plan. Daughter confirms MCR/BCBS. D/C POC - daughter would like referral sent to [facility] first and then [facility]. Per PT note, pt will need rolling walker prior to d/c as well. Will load to [facility] for [facility] and send to them and [facility] through [address] [place] [address]. SW/CM will continue to follow for d/c needs. Care Management Interventions  PCP Verified by CM: Yes  Mode of Transport at Discharge: Other (see comment)  Transition of Care Consult (CM Consult): SNF  Physical Therapy Consult: Yes  Current Support Network: Own Home, Lives Alone  Confirm Follow Up Transport: Family  Plan discussed with Pt/Family/Caregiver: Yes  Freedom of Choice Offered:  Yes

## 2017-03-25 NOTE — PROGRESS NOTES
Sanjeev Jimenez  Admission Date: 3/23/2017             Daily Progress Note: 3/25/2017    The patient's chart is reviewed and the patient is discussed with the staff. 81 yo male with a history of CAD with CABG in 2006 with EF 30-35% on 6/2016, COPD, HTN, chronic systolic heart failure, and CKD stage 3. Last PFT on 7/10/2016 with FVC 60%, FEV1 51%, FEV1/FVC 83%. He is managed with Symbicort, Albuterol via nebulizer prn, and oxygen. He uses oxygen at 2L NC every night and as needed during the day. He has recently had to use his oxygen more frequently during the day. Seen by Pulmonary the day prior to admission with BNP 2765 and creat 1.96 and was advised to go to the ER for further evaluation. In ER patient was hypoxic and CXR with bilateral effusions. He was admitted by Cardiology and aggressively diuresed. Subjective:     Currently on o2 at 2.5 lpm via NC with o2 sat 99%. Minimal non-productive cough.   Denies chest pain, palpitaitons, n/v.     Current Facility-Administered Medications   Medication Dose Route Frequency    furosemide (LASIX) tablet 40 mg  40 mg Oral ACB&D    albuterol (PROVENTIL VENTOLIN) nebulizer solution 2.5 mg  2.5 mg Nebulization Q6H PRN    doxazosin (CARDURA) tablet 1 mg  1 mg Oral QHS    aspirin chewable tablet 81 mg  81 mg Oral DAILY    simvastatin (ZOCOR) tablet 20 mg  20 mg Oral QHS    lisinopril (PRINIVIL, ZESTRIL) tablet 40 mg  40 mg Oral DAILY    risperiDONE (RisperDAL) tablet 3 mg  3 mg Oral QHS    tamsulosin (FLOMAX) capsule 0.4 mg  0.4 mg Oral QHS    metoprolol succinate (TOPROL-XL) XL tablet 50 mg  50 mg Oral Q12H    pantoprazole (PROTONIX) tablet 40 mg  40 mg Oral ACB    budesonide (PULMICORT) 500 mcg/2 ml nebulizer suspension  500 mcg Nebulization BID RT    And    albuterol CONCENTRATE 2.5mg/0.5 mL neb soln  2.5 mg Nebulization Q6H RT       Review of Systems  Constitutional: negative for fever, chills, sweats  Cardiovascular: negative for chest pain, palpitations, syncope, ++edema  Gastrointestinal:  negative for dysphagia, reflux, vomiting, diarrhea, abdominal pain, or melena  Neurologic:  negative for focal weakness, numbness, headache    Objective:     Vitals:    03/25/17 0056 03/25/17 0431 03/25/17 0655 03/25/17 0710   BP: 122/50 136/71  138/67   Pulse: 64 78  62   Resp: 18 18  17   Temp: 97.5 °F (36.4 °C) 97.5 °F (36.4 °C)  97.6 °F (36.4 °C)   SpO2: 98% 98% 98% 99%   Weight:  150 lb 14.4 oz (68.4 kg)     Height:         Intake and Output:   03/23 1901 - 03/25 0700  In: 1034.8 [P.O.:840; I.V.:194.8]  Out: 5600 [Urine:5600]  03/25 0701 - 03/25 1900  In: 138 [I.V.:138]  Out: 525 [Urine:525]    Physical Exam:   Constitution:  the patient is well developed and in no acute distress  EENMT:  Sclera clear, pupils equal, oral mucosa moist  Respiratory: diminished with crackles to bilateral posterior bases, O2 at 3 lpm  Cardiovascular:  RRR without M,G,R - frequent PVCs  Gastrointestinal: soft and non-tender; with positive bowel sounds. Musculoskeletal: warm without cyanosis. There is 2+/3+ lower leg edema. Skin:  no jaundice or rashes, no open wounds   Neurologic: no gross neuro deficits     Psychiatric:  alert and oriented x 3    CHEST XRAY:   3/23          LAB   Recent Labs      03/23/17   1717   WBC  5.2   HGB  13.3*   HCT  41.6   PLT  129*     Recent Labs      03/23/17   1717  03/23/17   1400   NA  137  138   K  4.9  4.6   CL  99  99   CO2  33*  37*   GLU  98  115*   BUN  31*  29*   CREA  1.96*  1.95*   CA  8.7  8.8   TROIQ  <0.02*   --    ALB  3.4   --    TBILI  0.9   --    ALT  24   --    SGOT  23   --      No results for input(s): PH, PCO2, PO2, HCO3 in the last 72 hours. No results for input(s): LCAD, LAC in the last 72 hours.       Assessment:  (Medical Decision Making)     Hospital Problems  Date Reviewed: 3/25/2017          Codes Class Noted POA    * (Principal)Acute on chronic systolic (congestive) heart failure ([facility] ICD-10-CM: I50.23  ICD-9-CM: 428.23, 428.0  12/26/2016 Yes    With ongoing swelling  Excellent diuresis yesterday  Transitioned to po Lasix today      CKD (chronic kidney disease) stage 3, GFR 30-59 ml/min (Chronic) ICD-10-CM: N18.3  ICD-9-CM: 585.3  12/26/2016 Yes    Creat stable at 1.96 despite diuresis  (baseline appears to be around 1.7)      Pleural effusion, bilateral ICD-10-CM: J90  ICD-9-CM: 511.9  12/26/2016 Yes    Diuresing   Check follow up CXR in am      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 - May 2014         Hyperlipidemia (Chronic) ICD-10-CM: E78.5  ICD-9-CM: 272.4  9/13/2013 Yes        HTN (hypertension) (Chronic) ICD-10-CM: I10  ICD-9-CM: 401.9  9/13/2013 Yes        CAD (coronary artery disease) (Chronic) ICD-10-CM: I25.10  ICD-9-CM: 414.00  9/13/2013 Yes     11/2006:  LIMA-LAD, SVG-Dx, SVG-OM, SVG-RCA             COPD (chronic obstructive pulmonary disease) ([facility] [address] (Chronic) ICD-10-CM: J44.9  ICD-9-CM: 496  9/13/2013 Yes    No active wheezing  Nebs         Plan:  (Medical Decision Making)     --Albuterol / pulmicort  --lasix transitioned to po today - 40 mg po BID. Diuresed 4.3 liters yesterday  --PT following for mobility  --check follow up CXR in am      More than 50% of the time documented was spent in face-to-face contact with the patient and in the care of the patient on the floor/unit where the patient is located. Abena Saul NP     Lungs:  Improved BS at bases. Heart:  RRR with no Murmur/Rubs/Gallops    Additional Comments:  Still with edema but breathing better. Continue diuresis and check CXR tomorrow. I have spoken with and examined the patient. I agree with the above assessment and plan as documented.     Heena Pressley MD

## 2017-03-25 NOTE — PROGRESS NOTES
3/25/2017 7:01 AM    Admit Date: 3/23/2017    Admit Diagnosis: Acute on chronic systolic (congestive) heart failure (HCC)      Subjective:    Patient admitted with systolic heart failure. On lasix gtt. Doing better.  Transition to po lasix in anticipation of dc sunday    Objective:      Visit Vitals    /71 (BP 1 Location: Right arm, BP Patient Position: At rest)    Pulse 78    Temp 97.5 °F (36.4 °C)    Resp 18    Ht 5' 5\" (1.651 m)    Wt 68.4 kg (150 lb 14.4 oz)    SpO2 98%    BMI 25.11 kg/m2       ROS:  General ROS: negative for - chills  Hematological and Lymphatic ROS: negative for - blood clots or jaundice  Respiratory ROS: no cough, shortness of breath, or wheezing  Cardiovascular ROS: no chest pain or dyspnea on exertion  Gastrointestinal ROS: no abdominal pain, change in bowel habits, or black or bloody stools  Neurological ROS: no TIA or stroke symptoms    Physical Exam:    Physical Examination: General appearance - alert, well appearing, and in no distress  Mental status - alert, oriented to person, place, and time  Eyes - pupils equal and reactive, extraocular eye movements intact  Neck/lymph - supple, no significant adenopathy  Chest/CV - clear to auscultation, no wheezes, rales or rhonchi, symmetric air entry  Heart - normal rate, regular rhythm, normal S1, S2, no murmurs, rubs, clicks or gallops  Abdomen/GI - soft, nontender, nondistended, no masses or organomegaly  Musculoskeletal - no joint tenderness, deformity or swelling  Extremities - peripheral pulses normal, no pedal edema, no clubbing or cyanosis  Skin - normal coloration and turgor, no rashes, no suspicious skin lesions noted    Current Facility-Administered Medications   Medication Dose Route Frequency    furosemide (LASIX) tablet 40 mg  40 mg Oral ACB&D    albuterol (PROVENTIL VENTOLIN) nebulizer solution 2.5 mg  2.5 mg Nebulization Q6H PRN    doxazosin (CARDURA) tablet 1 mg  1 mg Oral QHS    aspirin chewable tablet 81 mg 81 mg Oral DAILY    simvastatin (ZOCOR) tablet 20 mg  20 mg Oral QHS    lisinopril (PRINIVIL, ZESTRIL) tablet 40 mg  40 mg Oral DAILY    risperiDONE (RisperDAL) tablet 3 mg  3 mg Oral QHS    tamsulosin (FLOMAX) capsule 0.4 mg  0.4 mg Oral QHS    metoprolol succinate (TOPROL-XL) XL tablet 50 mg  50 mg Oral Q12H    pantoprazole (PROTONIX) tablet 40 mg  40 mg Oral ACB    budesonide (PULMICORT) 500 mcg/2 ml nebulizer suspension  500 mcg Nebulization BID RT    And    albuterol CONCENTRATE 2.5mg/0.5 mL neb soln  2.5 mg Nebulization Q6H RT       Data Review:   @LABRCNT(Na,K,BUN,CREA,WBC,HGB,HCT,PLT,INR,TRP,TCHOL*,Triglyceride*,LDL*,LDLCPOC HDL*,HDL])@    TELEMETRY: nsr    Assessment/Plan:     Principal Problem:    Acute on chronic systolic (congestive) heart failure (HCC) (12/26/2016) on lasix gtt. Improving/ continue    Active Problems:    Hyperlipidemia (9/13/2013)The current medical regimen is effective;  continue present plan and medications. HTN (hypertension) (9/13/2013)The current medical regimen is effective;  continue present plan and medications. 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         Overview: [facility] - May 2014      CKD (chronic kidney disease) stage 3, GFR 30-59 ml/min (12/26/2016)      Pleural effusion, bilateral (12/26/2016)          Matthew Lanrdy MD

## 2017-03-25 NOTE — PROGRESS NOTES
Verbal bedside report given to oncoming RNDyllan Cap  . Patient's situation, background, assessment and recommendations provided. Opportunity for questions provided. Oncoming RN assumed care of patient.

## 2017-03-26 NOTE — PROGRESS NOTES
Pt sat up on side of bed for thoracentesis. Consent obtained. Time out performed. Pts vitals monitored throughout procedure. Bilateral ultrasound done and pic taken of pleural fluid and placed on pt's chart.  ~400 ml yellow pleural fluid from Left. Pt tolerated procedure well with no adverse rxn. Specimens sent to the lab x 3 and labeled appropriately. Site dressed appropriately and report given to pts Junior.    Lung sliding done and ultrasound findings reviewed by MD.

## 2017-03-26 NOTE — PROGRESS NOTES
Grace More  Admission Date: 3/23/2017             Daily Progress Note: 3/26/2017    The patient's chart is reviewed and the patient is discussed with the staff. 81 yo male with a history of CAD with CABG in 2006 with EF 30-35% on 6/2016, COPD, HTN, chronic systolic heart failure, and CKD stage 3. Last PFT on 7/10/2016 with FVC 60%, FEV1 51%, FEV1/FVC 83%. He is managed with Symbicort, Albuterol via nebulizer prn, and oxygen. He uses oxygen at 2L NC every night and as needed during the day. He has recently had to use his oxygen more frequently during the day. Seen by Pulmonary the day prior to admission with BNP 2765 and creat 1.96 and was advised to go to the ER for further evaluation. In ER patient was hypoxic and CXR with bilateral effusions. He was admitted by Cardiology and aggressively diuresed. Subjective:     Currently on o2 at 2.5 lpm via NC with o2 sat 99%. Minimally productive cough.   Denies chest pain, palpitaitons, n/v. Ongoing LE swelling    Current Facility-Administered Medications   Medication Dose Route Frequency    furosemide (LASIX) tablet 40 mg  40 mg Oral ACB&D    diphenhydrAMINE (BENADRYL) capsule 25 mg  25 mg Oral QHS PRN    albuterol (PROVENTIL VENTOLIN) nebulizer solution 2.5 mg  2.5 mg Nebulization Q6H PRN    doxazosin (CARDURA) tablet 1 mg  1 mg Oral QHS    aspirin chewable tablet 81 mg  81 mg Oral DAILY    simvastatin (ZOCOR) tablet 20 mg  20 mg Oral QHS    lisinopril (PRINIVIL, ZESTRIL) tablet 40 mg  40 mg Oral DAILY    risperiDONE (RisperDAL) tablet 3 mg  3 mg Oral QHS    tamsulosin (FLOMAX) capsule 0.4 mg  0.4 mg Oral QHS    metoprolol succinate (TOPROL-XL) XL tablet 50 mg  50 mg Oral Q12H    pantoprazole (PROTONIX) tablet 40 mg  40 mg Oral ACB    budesonide (PULMICORT) 500 mcg/2 ml nebulizer suspension  500 mcg Nebulization BID RT    And    albuterol CONCENTRATE 2.5mg/0.5 mL neb soln  2.5 mg Nebulization Q6H RT       Review of Systems  Constitutional: negative for fever, chills, sweats  Cardiovascular: negative for chest pain, palpitations, syncope, ++edema  Gastrointestinal:  negative for dysphagia, reflux, vomiting, diarrhea, abdominal pain, or melena  Neurologic:  negative for focal weakness, numbness, headache    Objective:     Vitals:    03/26/17 0205 03/26/17 0438 03/26/17 0710 03/26/17 0834   BP:  137/67 130/66    Pulse:  60 70    Resp:  18 18    Temp:  98.5 °F (36.9 °C) 96.3 °F (35.7 °C)    SpO2: 98% 96% 99% 92%   Weight:  150 lb (68 kg)     Height:         Intake and Output:   03/24 1901 - 03/26 0700  In: 0667 [P.O.:1370; I.V.:138]  Out: 3950 [Urine:3950]  03/26 0701 - 03/26 1900  In: 240 [P.O.:240]  Out: -     Physical Exam:   Constitution:  the patient is well developed and in no acute distress  EENMT:  Sclera clear, pupils equal, oral mucosa moist  Respiratory: diminished bilateral posterior bases, O2 at 3 lpm  Cardiovascular:  RRR without M,G,R   Gastrointestinal: soft and non-tender; with positive bowel sounds. Musculoskeletal: warm without cyanosis. There is 3+ lower leg edema.   Skin:  no jaundice or rashes, no open wounds   Neurologic: no gross neuro deficits     Psychiatric:  alert and oriented x 3    CHEST XRAY:   3/26          LAB   Recent Labs      03/23/17   1717   WBC  5.2   HGB  13.3*   HCT  41.6   PLT  129*     Recent Labs      03/26/17   0410  03/23/17   1717  03/23/17   1400   NA  142  137  138   K  3.3*  4.9  4.6   CL  95*  99  99   CO2  37*  33*  37*   GLU  88  98  115*   BUN  28*  31*  29*   CREA  1.69*  1.96*  1.95*   CA  8.1*  8.7  8.8   TROIQ   --   <0.02*   --    ALB   --   3.4   --    TBILI   --   0.9   --    ALT   --   24   --    SGOT   --   23   --          Assessment:  (Medical Decision Making)     Hospital Problems  Date Reviewed: 3/25/2017          Codes Class Noted POA    * (Principal)Acute on chronic systolic (congestive) heart failure (HCC) ICD-10-CM: I50.23  ICD-9-CM: 428.23, 428.0  12/26/2016 Yes    With ongoing swelling  Excellent diuresis yesterday  Transitioned to po Lasix 3/25      CKD (chronic kidney disease) stage 3, GFR 30-59 ml/min (Chronic) ICD-10-CM: N18.3  ICD-9-CM: 585.3  12/26/2016 Yes    Creat stable at 1.69 despite diuresis  (baseline appears to be around 1.7)      Pleural effusion, bilateral ICD-10-CM: J90  ICD-9-CM: 511.9  12/26/2016 Yes    Diuresing   Check follow up CXR in am      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 - May 2014         Hyperlipidemia (Chronic) ICD-10-CM: Y98.7  ICD-9-CM: 272.4  9/13/2013 Yes        HTN (hypertension) (Chronic) ICD-10-CM: I10  ICD-9-CM: 401.9  9/13/2013 Yes        CAD (coronary artery disease) (Chronic) ICD-10-CM: I25.10  ICD-9-CM: 414.00  9/13/2013 Yes     11/2006:  LIMA-LAD, SVG-Dx, SVG-OM, SVG-RCA             COPD (chronic obstructive pulmonary disease) (HCC) (Chronic) ICD-10-CM: J44.9  ICD-9-CM: 496  9/13/2013 Yes    No active wheezing  Nebs         Plan:  (Medical Decision Making)     --Albuterol / pulmicort  --lasix 40 mg po BID. Diuresed 2 liters yesterday   BNP 2765 >>>2777   16 lb weight loss since admission  --PT following for mobility  --continue to follow CXR      More than 50% of the time documented was spent in face-to-face contact with the patient and in the care of the patient on the floor/unit where the patient is located. Jerrica Hand, ROXANNA     Lungs:  Decreased BS L> R base  Heart:  RRR with no Murmur/Rubs/Gallops    Additional Comments:  US and tap if sufficient fluid. I have spoken with and examined the patient. I agree with the above assessment and plan as documented.     Yeni Braun MD

## 2017-03-26 NOTE — H&P (VIEW-ONLY)
CONSULT NOTE    Dora Clemens    3/24/2017    Date of Admission:  3/23/2017    The patient's chart is reviewed and the patient is discussed with the staff. Subjective:     Patient is a 80 y.o.  male seen and evaluated at the request of Dr. Pastor Lockhart. Patient was seen in our office yesterday as a work-in appointment. He had gained 16 pounds since 12/2016 and was having some dyspnea. Labs were obtained prior to patient leaving the office. BNP 3765, creatinine 1.96 and patient was advised to go to the ER. Patient was admitted yesterday by Cardiology. CXR with bilateral pleural effusions. Patient's oxygen saturation was in the 80s and placed on 2L NC. We have been consulted for pleural effusions. Past medical history includes: CAD with CABG in 2006, COPD, HTN, chronic systolic heart failure, CKD stage 3. Last PFT on 7/10/2016 with FVC 60%, FEV1 51%, FEV1/FVC 83%. He is managed with Symbicort, Albuterol via nebulizer prn, and oxygen. He uses oxygen at 2L NC every night and as needed during the day. He has recently had to use his oxygen more frequently during the day. Last EF 30-35% 6/2016. Patient denies fever/chills/nausea/vomiting. He denies any recent falls and doesn't use any assistive devices to ambulate. Physical therapy was in the room with patient and is recommending rehab. Patient's wife recently passed away 2 weeks ago and she helped with his care.     Review of Systems  A comprehensive review of systems was negative except for: Constitutional: positive for MARSHALL  Respiratory: positive for dyspnea on exertion or COPD  Cardiovascular: positive for dyspnea, lower extremity edema, dyspnea on exertion  Gastrointestinal: positive for GERD  Musculoskeletal: positive for muscle weakness    Patient Active Problem List   Diagnosis Code    Hyperlipidemia E78.5    HTN (hypertension) I10    CAD (coronary artery disease) I25.10    CHF (congestive heart failure) (HCC) I50.9    COPD (chronic obstructive pulmonary disease) ([facility]) J44.9    GERD (gastroesophageal reflux disease) K21.9    Prostatic hypertrophy, benign N40.0    Chronic systolic heart failure ([facility]) I50.22    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    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    Prior to Admission Medications   Prescriptions Last Dose Informant Patient Reported? Taking? Cholecalciferol, Vitamin D3, (VITAMIN D3) 1,000 unit cap 3/23/2017 at Unknown time  Yes Yes   Sig: Take  by mouth. OXYGEN-AIR DELIVERY SYSTEMS   Yes No   Sig: 3 L by Nasal route daily. 3 LPM Continuous every day and QHS   VIT C/VIT E/LUTEIN/MIN/OMEGA-3 (OCUVITE PO)   Yes No   Sig: Take  by mouth. VITAMIN E, DL,TOCOPHERYL ACET, (VITAMIN E ACETATE) 400 unit cap capsule   Yes No   Sig: Take  by mouth daily. ZINC ACETATE PO   Yes No   Sig: Take  by mouth. albuterol (PROAIR HFA) 90 mcg/actuation inhaler 3/23/2017 at Unknown time  No Yes   Si puffs qid prn   albuterol (PROVENTIL VENTOLIN) 2.5 mg /3 mL (0.083 %) nebulizer solution 3/23/2017 at Unknown time  No Yes   Si vial via nebulizer qid and prn;  Bill to medicare part B, dx COPD - J44.9  Indications: Chronic Obstructive Pulmonary Disease   ascorbic acid (VITAMIN C) 500 mg tablet 3/23/2017 at Unknown time  Yes Yes   Sig: Take  by mouth.    aspirin (BABY ASPIRIN) 81 mg chewable tablet 3/23/2017 at Unknown time  Yes Yes Sig: Take 81 mg by mouth daily. budesonide (RHINOCORT AQUA) 32 mcg/actuation nasal spray 3/23/2017 at Unknown time  Yes Yes   Si Sprays by Both Nostrils route. budesonide-formoterol (SYMBICORT) 160-4.5 mcg/actuation HFA inhaler 3/23/2017 at Unknown time  No Yes   Si puffs bid, rinse mouth after use   diphenhydrAMINE (BENADRYL) 25 mg capsule 3/23/2017 at Unknown time  Yes Yes   Sig: Take 25 mg by mouth nightly. doxazosin (CARDURA) 4 mg tablet 3/23/2017 at Unknown time  Yes Yes   Sig: Take 1 mg by mouth nightly. furosemide (LASIX) 40 mg tablet 3/23/2017 at Unknown time  No Yes   Sig: Take 1 Tab by mouth two (2) times a day. Patient taking differently: Take 40 mg by mouth daily. lisinopril (PRINIVIL, ZESTRIL) 40 mg tablet 3/23/2017 at Unknown time  No Yes   Sig: TAKE ONE TABLET BY MOUTH ONCE DAILY   metoprolol succinate (TOPROL-XL) 50 mg XL tablet   No No   Sig: Take 1 Tab by mouth every twelve (12) hours. montelukast (SINGULAIR) 10 mg tablet   No No   Si po q hs   omeprazole (PRILOSEC) 20 mg capsule   Yes No   Sig: Take 20 mg by mouth two (2) times a day. potassium chloride (KLOR-CON) 20 mEq packet   Yes No   Sig: Take 20 mEq by mouth daily. risperiDONE (RISPERDAL) 3 mg tablet   Yes No   Sig: Take  by mouth nightly. simvastatin (ZOCOR) 20 mg tablet   Yes No   Sig: Take  by mouth nightly. tamsulosin (FLOMAX) 0.4 mg capsule   No No   Sig: Take 1 Cap by mouth nightly.       Facility-Administered Medications: None       Past Medical History:   Diagnosis Date    Arrhythmia     Arthritis     knees and hands    Atelectasis 2015    CAD (coronary artery disease)     CABG    Carotid stenosis without infarct     Chest trauma     right sided from [facility], remote    CHF (congestive heart failure) ([facility] [address] 2013    Compensated      Chronic kidney disease     cysts in kidney    Chronic obstructive pulmonary disease (HCC)     Chronic systolic heart failure ([facility] [address] 2014    COPD (chronic obstructive pulmonary disease) ([facility] [address] 9/13/2013    Coronary atherosclerosis of native coronary vessel     Elevated hemidiaphragm     right     GERD (gastroesophageal reflux disease) 9/13/2013    Heart failure ([facility] [address]     Heart failure, left, with LVEF >40% (HCC)     60%-65% on cath 11/2006    HTN (hypertension) 9/13/2013    Hyperlipidemia 9/13/2013    Hypertension     ICD (implantable cardioverter-defibrillator) in place 5/30/2014    [facility] - May 2014     Liver disease     Other chest pain 9/13/2013    Atypical, chest tightness     Pleural effusion, left 12/2006    Prostatic hypertrophy, benign 9/13/2013    Psychiatric disorder     anxiety    Restrictive lung disease     Rheumatic aortic insufficiency     Rhinitis 2/5/2015    S/P CABG (coronary artery bypass graft)      Past Surgical History:   Procedure Laterality Date    CARDIAC SURG PROCEDURE UNLIST  11/2006    cabg x 4    HX CATARACT REMOVAL      bilateral    HX PACEMAKER  5/2014    ICD     Social History     Social History    Marital status:      Spouse name: N/A    Number of children: N/A    Years of education: N/A     Occupational History    , retired      Social History Main Topics    Smoking status: Former Smoker     Packs/day: 1.00     Years: 30.00     Quit date: 1/1/1989    Smokeless tobacco: Never Used    Alcohol use 1.5 oz/week     3 Cans of beer per week      Comment: occasional    Drug use: No    Sexual activity: Yes     Partners: Female     Other Topics Concern    Not on file     Social History Narrative    He is  and has two children.  He is a retired .      Family History   Problem Relation Age of Onset    Hypertension Other     Stroke Other      CVA    Other Other      renal failure     No Known Allergies    Current Facility-Administered Medications   Medication Dose Route Frequency    perflutren lipid microspheres (DEFINITY) in NS bolus IV  1 mL IntraVENous PRN    doxazosin (CARDURA) tablet 1 mg  1 mg Oral QHS    aspirin chewable tablet 81 mg  81 mg Oral DAILY    simvastatin (ZOCOR) tablet 20 mg  20 mg Oral QHS    lisinopril (PRINIVIL, ZESTRIL) tablet 40 mg  40 mg Oral DAILY    risperiDONE (RisperDAL) tablet 3 mg  3 mg Oral QHS    tamsulosin (FLOMAX) capsule 0.4 mg  0.4 mg Oral QHS    albuterol (PROVENTIL HFA, VENTOLIN HFA, PROAIR HFA) inhaler 1 Puff  1 Puff Inhalation Q6H PRN    metoprolol succinate (TOPROL-XL) XL tablet 50 mg  50 mg Oral Q12H    pantoprazole (PROTONIX) tablet 40 mg  40 mg Oral ACB    furosemide (LASIX) 100 mg in 0.9% sodium chloride 100 mL infusion  10 mg/hr IntraVENous CONTINUOUS    budesonide (PULMICORT) 500 mcg/2 ml nebulizer suspension  500 mcg Nebulization BID RT    And    albuterol CONCENTRATE 2.5mg/0.5 mL neb soln  2.5 mg Nebulization Q6H RT         Objective:     Vitals:    03/24/17 0247 03/24/17 0551 03/24/17 0740 03/24/17 0812   BP:  136/66  155/66   Pulse:  78  86   Resp:  17  18   Temp:  97.6 °F (36.4 °C)  97 °F (36.1 °C)   SpO2: 98% 99% 95% 93%   Weight:  164 lb (74.4 kg)     Height:           PHYSICAL EXAM     Constitutional:  the patient is well developed and in no acute distress, on 3L NC with O2 sat 93%  EENMT:  Sclera clear, pupils equal, oral mucosa moist  Respiratory: diminished bilateral bases, no wheezing   Cardiovascular:  RRR without M,G,R  Gastrointestinal: soft and non-tender; with positive bowel sounds. Musculoskeletal: warm without cyanosis. There is 2+ pitting lower leg edema.   Skin:  no jaundice or rashes, no wounds   Neurologic: no gross neuro deficits     Psychiatric:  alert and oriented x 3    Chest X-ray 3/23/17:          Recent Labs      03/23/17   1717   WBC  5.2   HGB  13.3*   HCT  41.6   PLT  129*     Recent Labs      03/23/17   1717  03/23/17   1400   NA  137  138   K  4.9  4.6   CL  99  99   GLU  98  115*   CO2  33*  37*   BUN  31*  29*   CREA  1.96*  1.95*   CA  8.7  8.8   TROIQ  <0.02*   --    ALB  3.4 --    TBILI  0.9   --    ALT  24   --    SGOT  23   --      No results for input(s): PH, PCO2, PO2, HCO3 in the last 72 hours. No results for input(s): LCAD, LAC in the last 72 hours. Assessment:  (Medical Decision Making)     Hospital Problems  Date Reviewed: 3/24/2017          Codes Class Noted POA    * (Principal)Acute on chronic systolic (congestive) heart failure (HCC) ICD-10-CM: I50.23  ICD-9-CM: 428.23, 428.0  12/26/2016 Yes    BNP 3765 yesterday, on lasix gtt     CKD (chronic kidney disease) stage 3, GFR 30-59 ml/min (Chronic) ICD-10-CM: N18.3  ICD-9-CM: 585.3  12/26/2016 Yes    Creatinine 1.96     Pleural effusion, bilateral ICD-10-CM: J90  ICD-9-CM: 511.9  12/26/2016 Yes    Will ultrasound     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 - May 2014             Hyperlipidemia (Chronic) ICD-10-CM: E78.5  ICD-9-CM: 272.4  9/13/2013 Yes        HTN (hypertension) (Chronic) ICD-10-CM: I10  ICD-9-CM: 401.9  9/13/2013 Yes        CAD (coronary artery disease) (Chronic) ICD-10-CM: I25.10  ICD-9-CM: 414.00  9/13/2013 Yes     11/2006:  LIMA-LAD, SVG-Dx, SVG-OM, SVG-RCA               COPD  No wheezing     Plan:  (Medical Decision Making)     --Continue Albuterol, Pulmicort  --Will ultrasound and plan thoracentesis. Platelets 499     More than 50% of the time documented was spent in face-to-face contact with the patient and in the care of the patient on the floor/unit where the patient is located. Thank you very much for this referral.  We appreciate the opportunity to participate in this patient's care. Will follow along with above stated plan. Willie Montejo, ROXANNA     Lungs:  Decreased BS bilaterally  Heart:  RRR with no Murmur/Rubs/Gallops; 2-3 + LE edema    Additional Comments:  Pt diuresing and feeling better. No distress at present. He has considerable LE edema.  Will hold on thoracentesis at present and consider doing in the next day or two once edema has improved with aggressive diuresis. Otherwise, he would likely need additional taps until the edema is reduced. Requires strict I/O.     Pooja Sweeney MD

## 2017-03-26 NOTE — PROGRESS NOTES
Problem: Falls - Risk of  Goal: *Absence of falls  Outcome: Progressing Towards Goal  Pt progressing towards goal. No falls since admission. Bed low and locked. Call light within reach. Side rails x 2. Gripper socks applied. Personal belongings within reach. Pt verbalizes understanding to call for assistance. Problem: Heart Failure: Day 2  Goal: Activity/Safety  Outcome: Progressing Towards Goal  Up in room moving around.

## 2017-03-26 NOTE — PROCEDURES
Thoracentesis Procedure Note      Procedure:  Left Thoracentesis with ultrasound guidance    Indication:  Left Pleural effusion     Summary:    After informed consent was obtained, the patient was positioned upright in the usual fashion. Ultrasound was used to identify the optimal spot for pleural drainage. There was not enough fluid on the R to safely drain. The lower left intercostal space was anesthetized with 1% Lidocaine to achieve adequate anesthesia. The pleural space was entered with yellow fluid identified. Lidocaine was instilled within the pleural space as well. A small stab incision was made at the site of local anesthesia and the thoracentesis catheter was advanced into the pleural space. Approximately 300 ml of fluid was obtained with ease. The procedure was terminated after pleural drainage stopped. Air was not aspirated during the procedure. A bandaid was placed over the incision after adequate hemostasis was achieved. A CXR is not pending as a follow up US revealed a + lung slide consistent with no PTX. The pleural fluid will be sent for protein, LDH and glucose. Additional studies will be performed if the fluid is an exudate.     EBL: Negligible    Post Procedure Dx: Pleural effusion       Signed By: Jenniffer Covington MD

## 2017-03-26 NOTE — INTERVAL H&P NOTE
H&P Update:  Juan Manuel Smith was seen and examined. History and physical has been reviewed. The patient has been examined. There have been no significant clinical changes since the completion of the originally dated progress note from earlier today.     Signed By: Pacheco Jha MD     March 26, 2017 2:08 PM

## 2017-03-26 NOTE — PROGRESS NOTES
3/26/2017 7:01 AM    Admit Date: 3/23/2017    Admit Diagnosis: Acute on chronic systolic (congestive) heart failure (HCC)      Subjective:    Patient admitted with systolic heart failure. On lasix gtt. Doing better.  Transition to po lasix in anticipation of dc Sunday still tachypnic    Objective:      Visit Vitals    /67 (BP 1 Location: Left arm, BP Patient Position: At rest)    Pulse 60    Temp 98.5 °F (36.9 °C)    Resp 18    Ht 5' 5\" (1.651 m)    Wt 68 kg (150 lb)    SpO2 96%    BMI 24.96 kg/m2       ROS:  General ROS: negative for - chills  Hematological and Lymphatic ROS: negative for - blood clots or jaundice  Respiratory ROS: no cough, shortness of breath, or wheezing  Cardiovascular ROS: no chest pain or dyspnea on exertion  Gastrointestinal ROS: no abdominal pain, change in bowel habits, or black or bloody stools  Neurological ROS: no TIA or stroke symptoms    Physical Exam:    Physical Examination: General appearance - alert, well appearing, and in no distress  Mental status - alert, oriented to person, place, and time  Eyes - pupils equal and reactive, extraocular eye movements intact  Neck/lymph - supple, no significant adenopathy  Chest/CV - clear to auscultation, no wheezes, rales or rhonchi, symmetric air entry  Heart - normal rate, regular rhythm, normal S1, S2, no murmurs, rubs, clicks or gallops  Abdomen/GI - soft, nontender, nondistended, no masses or organomegaly  Musculoskeletal - no joint tenderness, deformity or swelling  Extremities - peripheral pulses normal, no pedal edema, no clubbing or cyanosis  Skin - normal coloration and turgor, no rashes, no suspicious skin lesions noted    Current Facility-Administered Medications   Medication Dose Route Frequency    furosemide (LASIX) tablet 40 mg  40 mg Oral ACB&D    diphenhydrAMINE (BENADRYL) capsule 25 mg  25 mg Oral QHS PRN    albuterol (PROVENTIL VENTOLIN) nebulizer solution 2.5 mg  2.5 mg Nebulization Q6H PRN    doxazosin (CARDURA) tablet 1 mg  1 mg Oral QHS    aspirin chewable tablet 81 mg  81 mg Oral DAILY    simvastatin (ZOCOR) tablet 20 mg  20 mg Oral QHS    lisinopril (PRINIVIL, ZESTRIL) tablet 40 mg  40 mg Oral DAILY    risperiDONE (RisperDAL) tablet 3 mg  3 mg Oral QHS    tamsulosin (FLOMAX) capsule 0.4 mg  0.4 mg Oral QHS    metoprolol succinate (TOPROL-XL) XL tablet 50 mg  50 mg Oral Q12H    pantoprazole (PROTONIX) tablet 40 mg  40 mg Oral ACB    budesonide (PULMICORT) 500 mcg/2 ml nebulizer suspension  500 mcg Nebulization BID RT    And    albuterol CONCENTRATE 2.5mg/0.5 mL neb soln  2.5 mg Nebulization Q6H RT       Data Review:   @LABRCNT(Na,K,BUN,CREA,WBC,HGB,HCT,PLT,INR,TRP,TCHOL*,Triglyceride*,LDL*,LDLCPOC HDL*,HDL])@    TELEMETRY: nsr    Assessment/Plan:     Principal Problem:    Acute on chronic systolic (congestive) heart failure (HCC) (12/26/2016) on lasix gtt. Improving/ continue    Active Problems:    Hyperlipidemia (9/13/2013)The current medical regimen is effective;  continue present plan and medications. HTN (hypertension) (9/13/2013)The current medical regimen is effective;  continue present plan and medications. 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         Overview: [facility] - May 2014      CKD (chronic kidney disease) stage 3, GFR 30-59 ml/min (12/26/2016)      Pleural effusion, bilateral (12/26/2016)          Theresa Crouch MD

## 2017-03-26 NOTE — ROUTINE PROCESS
CHF teaching started post introduction to pt/family via phone; aware of diagnosis. Planner/scale(home) @ BS and will follow. Smoking/ ETOH/Illicit drug use cessation covered. Pt/family aware that I can not prescribe nor adjust  medications: 15mins  Palliative Care score: 25, entered  Start 2L/D Fluid restriction  CHF teaching continues to pt/family. Emphasis on taking prescription meds as ordered, to keep F/U appts and to call MD STAT if any of the following occur:   If you gain 2 lbs in one day or 5 lbs in a week, and short of breath.  If you can not lay flat without developing short of breath or rapid breathing at night; or if it wakes you up. Develop a cough or wheezing.  If you notice swollen hands/feet/ankles or stomach with a bloated/ full feeling.  If you become confused or mentally fuzzy or dizzy.  If you notice a rapid or change in your heart rate.  If you become more exhausted all the time and unable to do the same level of activity without stopping to catch your breath. Drink no more than 8 cups a day in 8 oz. cups. Your Heart can not handle any more. Stay away from salt (limit anything with salt or sodium in it). Limit to 250mg per serving.   Pt/family verbalizes understanding, will follow to reinforce teaching skills: 20 mins    Reinforce all    Lee Tolbert is daughter

## 2017-03-26 NOTE — PROGRESS NOTES
Problem: Mobility Impaired (Adult and Pediatric)  Goal: *Acute Goals and Plan of Care (Insert Text)  1. Mr. Ramses Zhang will perform supine to sit and sit to supine independently in 7 days. 2. Mr. Ramses Zhang will perform sit to stand and bed to chair independently in 7 days. 3. Mr. Ramses Zhang will perform gait with least restrictive device 250 ft independently in 7 days. 4. Mr. Ramses Zhang will perform up and down 4 steps with rail independently in 7 days. PHYSICAL THERAPY: Daily Note, Treatment Day: 1st and AM 3/26/2017  INPATIENT: Hospital Day: 4  [place] / Product Type: Medicare /      NAME/AGE/GENDER: Angélica Yee is a 80 y.o. male     PRIMARY DIAGNOSIS: Acute on chronic systolic (congestive) heart failure (HCC) Acute on chronic systolic (congestive) heart failure (HCC) Acute on chronic systolic (congestive) heart failure (HCC)        ICD-10: Treatment Diagnosis:       · Generalized Muscle Weakness (M62.81)  · Difficulty in walking, Not elsewhere classified (R26.2)   Precaution/Allergies:  Review of patient's allergies indicates no known allergies. ASSESSMENT:      Mr. Ramses Zhang presents to PT seated in recliner upon PT arrival to begin treatment. Patient on 2L O2 throughout PT. Patient stood from recliner with contact guard assistance and ambulated with contact guard assistance on 2L O2 for 30 feet before returning to room. Patient then sat in recliner with contact guard assistance ,and performed seated therapeutic exercises pre grid below. Patient demonstrated no signs of dyspnea during physical therapy treatment. Patient concluded PT seated in recliner in room with all needs in reach. This section established at most recent assessment   PROBLEM LIST (Impairments causing functional limitations):  1. Decreased Strength  2. Decreased Transfer Abilities  3. Decreased Ambulation Ability/Technique  4.  Decreased Activity Tolerance    INTERVENTIONS PLANNED: (Benefits and precautions of physical therapy have been discussed with the patient.)  1. Bed Mobility  2. Therapeutic Activites  3. Therapeutic Exercise/Strengthening  4. Transfer Training      TREATMENT PLAN: Frequency/Duration: 3 times a week for duration of hospital stay  Rehabilitation Potential For Stated Goals: GOOD      RECOMMENDED REHABILITATION/EQUIPMENT: (at time of discharge pending progress): Continue Skilled Therapy and Rehab. HISTORY:   History of Present Injury/Illness (Reason for Referral): Jeronimo Moore is a 80 y.o. male with a PMH of CAD with CABG in 2006, HTN, chronic systolic HF (last EF 76-02% 6/2016), GERD, COPD, O2 prn and [facility] Dual chamber ICD, who presented with progressive dyspnea, MARSHALL, LE edema, weakness, and 14lb wgt gain. In ED labs showed BNP of 3765, creatinine of 1.96 (baseline around 1.7), and CXR shows bilateral pleural effusions. He was hypoxic on arrival with sats in 80s and was placed on 2L NC. Upon exam the patient is using accessory muscles to breath and has +4 pitting edema. He denies chest pain, palpitations, nausea, vomiting, fever or chills. He received 40 mg of IV lasix in the ED. Past Medical History/Comorbidities:   Mr. Sada Dunn  has a past medical history of Arrhythmia; Arthritis; Atelectasis (2/5/2015); CAD (coronary artery disease); Carotid stenosis without infarct; Chest trauma; CHF (congestive heart failure) ([address] (9/13/2013); Chronic kidney disease; Chronic obstructive pulmonary disease ([address] Chronic systolic heart failure (HCC) (5/30/2014); COPD (chronic obstructive pulmonary disease) ([address] (9/13/2013); Coronary atherosclerosis of native coronary vessel; Elevated hemidiaphragm; GERD (gastroesophageal reflux disease) (9/13/2013); Heart failure ([address] Heart failure, left, with LVEF >40% ([address] HTN (hypertension) (9/13/2013); Hyperlipidemia (9/13/2013); Hypertension; 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 Mr. Beverly Harrison  has a past surgical history that includes cardiac surg procedure unlist (11/2006); pacemaker (5/2014); and cataract removal.  Social History/Living Environment:   Home Environment: Private residence  # Steps to Enter: 4  One/Two Story Residence: One story  Living Alone: Yes  Support Systems: Child(erick)  Patient Expects to be Discharged to[de-identified] Private residence  Current DME Used/Available at Home: Oxygen, portable  Tub or Shower Type: Tub/Shower combination  Prior Level of Function/Work/Activity:  independent  age, CHF, oxygen use   Number of Personal Factors/Comorbidities that affect the Plan of Care: 3+: HIGH COMPLEXITY   EXAMINATION:   Most Recent Physical Functioning:   Gross Assessment:                  Posture:  Posture (WDL): Exceptions to WDL  Posture Assessment: Forward head, Rounded shoulders  Balance:  Sitting: Intact  Standing: Impaired  Standing - Static: Fair  Standing - Dynamic : Fair Bed Mobility:     Wheelchair Mobility:     Transfers:  Sit to Stand: Contact guard assistance  Stand to Sit: Contact guard assistance  Gait:     Speed/Lyla: Slow;Shuffled  Step Length: Left shortened;Right shortened  Gait Abnormalities: Shuffling gait  Distance (ft): 30 Feet (ft)  Assistive Device: Walker, rolling (IV pole)  Ambulation - Level of Assistance: Contact guard assistance  Interventions: Manual cues; Verbal cues       Body Structures Involved:  1. Muscles Body Functions Affected:  1. Movement Related Activities and Participation Affected:  1. Mobility  2.  Domestic Life   Number of elements that affect the Plan of Care: 4+: HIGH COMPLEXITY   CLINICAL PRESENTATION:   Presentation: Evolving clinical presentation with changing clinical characteristics: MODERATE COMPLEXITY   CLINICAL DECISION MAKIN[address] [place] [id number] AM-PAC 6 Clicks   Basic Mobility Inpatient Short Form  How much difficulty does the patient currently have. .. Unable A Lot A Little None   1. Turning over in bed (including adjusting bedclothes, sheets and blankets)? [ ] 1   [ ] 2   [X] 3   [ ] 4   2. Sitting down on and standing up from a chair with arms ( e.g., wheelchair, bedside commode, etc.)   [ ] 1   [ ] 2   [X] 3   [ ] 4   3. Moving from lying on back to sitting on the side of the bed? [ ] 1   [ ] 2   [X] 3   [ ] 4   How much help from another person does the patient currently need. .. Total A Lot A Little None   4. Moving to and from a bed to a chair (including a wheelchair)? [ ] 1   [ ] 2   [X] 3   [ ] 4   5. Need to walk in hospital room? [ ] 1   [ ] 2   [X] 3   [ ] 4   6. Climbing 3-5 steps with a railing? [ ] 1   [ ] 2   [X] 3   [ ] 4   © 2007, Trustees of [address] [place] [address] [address] [id number], under license to [employer]. All rights reserved    Score:  Initial: 18 Most Recent: X (Date: -- )     Interpretation of Tool:  Represents activities that are increasingly more difficult (i.e. Bed mobility, Transfers, Gait). Score 24 23 22-20 19-15 14-10 9-7 6       Modifier CH CI CJ CK CL CM CN         · Mobility - Walking and Moving Around:               - CURRENT STATUS:    CK - 40%-59% impaired, limited or restricted               - GOAL STATUS:           CK - 40%-59% impaired, limited or restricted               - D/C STATUS:                       CK - 40%-59% impaired, limited or restricted  [place] / Product Type: Medicare /       Medical Necessity:     · Patient is expected to demonstrate progress in functional technique to increase independence with mobility and gait. .  Reason for Services/Other Comments:  · Patient continues to require present interventions due to patient's inability to function at baseline.    Use of outcome tool(s) and clinical judgement create a POC that gives a: Questionable prediction of patient's progress: MODERATE COMPLEXITY                 TREATMENT:   (In addition to Assessment/Re-Assessment sessions the following treatments were rendered)   Pre-treatment Symptoms/Complaints:  none  Pain: Initial:   Pain Intensity 1: 0  Post Session:  0/10      Assessment/Reassessment only, no treatment provided today     Braces/Orthotics/Lines/Etc:   · IV  · O2 Device: Nasal cannula  Treatment/Session Assessment:    · Response to Treatment:  good  · Interdisciplinary Collaboration:  · Registered Nurse  · After treatment position/precautions:  · Up in chair  · Call light within reach  · RN notified  · Compliance with Program/Exercises: Will assess as treatment progresses. · Recommendations/Intent for next treatment session: \"Next visit will focus on advancements to more challenging activities and reduction in assistance provided\".   Total Treatment Duration: 14 minutes        Marley Ling, PT

## 2017-03-26 NOTE — PROGRESS NOTES
Verbal bedside report given to ju Gray RN. Patient's situation, background, assessment and recommendations provided. Opportunity for questions provided. Oncoming RN assumed care of patient. Pt had thoracentesis today. 400 ML off left lung. bandaid in place.   No drainage of shadow on bandage

## 2017-03-27 NOTE — PROGRESS NOTES
[facility] CARDIOLOGY PROGRESS NOTE           3/27/2017 8:32 AM    Admit Date: 3/23/2017      Subjective:   Patient still very dyspneic. He has had issues with thick sputum and coughing. ROS:  Cardiovascular:  As noted above    Objective:      Vitals:    03/26/17 2100 03/26/17 2119 03/27/17 0024 03/27/17 0431   BP:  124/63 123/63 129/60   Pulse:  79 86 86   Resp:  17 18 18   Temp:  97 °F (36.1 °C) 97 °F (36.1 °C) 97 °F (36.1 °C)   SpO2: 96% 100% 99% 97%   Weight:    68.8 kg (151 lb 11.2 oz)   Height:           Physical Exam:  General-No Acute Distress  Neck- supple, no JVD  CV- regular rate and rhythm no MRG  Lung- crackles bilaterally  Abd- soft, nontender, nondistended  Ext- no edema bilaterally. Skin- warm and dry    Data Review:   Recent Labs      03/26/17   0410   NA  142   K  3.3*   BUN  28*   CREA  1.69*   GLU  88       Assessment/Plan:     Principal Problem:    Acute on chronic systolic (congestive) heart failure ([address] - restart IV lasix. Medical therapy appropriate. Check labs today. PPD placed and may need STR.       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 - May 2014      CKD (chronic kidney disease) stage 3, GFR 30-59 ml/min (12/26/2016) - This stable and tolerating diuresis      Pleural effusion, bilateral (12/26/2016) - S/P (L)thoracentesis          Karin Officer, MD  3/27/2017 8:32 AM

## 2017-03-27 NOTE — PROGRESS NOTES
[facility] offered a rehab bed for tomorrow. SW informed pt's alexandra and she is in agreement with plan.

## 2017-03-27 NOTE — PROGRESS NOTES
Sylvia Clemente  Admission Date: 3/23/2017             Daily Progress Note: 3/27/2017    The patient's chart is reviewed and the patient is discussed with the staff. 81 yo male with a history of CAD with CABG in 2006 with EF 30-35% on 6/2016, COPD, HTN, chronic systolic heart failure, and CKD stage 3. Last PFT on 7/10/2016 with FVC 60%, FEV1 51%, FEV1/FVC 83%. He is managed with Symbicort, Albuterol via nebulizer prn, and oxygen. He uses oxygen at 2L NC every night and as needed during the day. He has recently had to use his oxygen more frequently during the day. Seen by Pulmonary the day prior to admission with BNP 2765 and creat 1.96 and was advised to go to the ER for further evaluation. In ER patient was hypoxic and CXR with bilateral effusions. He was admitted by Cardiology and aggressively diuresed.    3/26 Thoracentesis Left, 300ml removed     Subjective:     Patient lying in bed, awake and alert  Cough with clear sputum    Current Facility-Administered Medications   Medication Dose Route Frequency    albuterol CONCENTRATE 2.5mg/0.5 mL neb soln  2.5 mg Nebulization Q6HWA RT    And    budesonide (PULMICORT) 500 mcg/2 ml nebulizer suspension  500 mcg Nebulization BID RT    furosemide (LASIX) tablet 40 mg  40 mg Oral ACB&D    diphenhydrAMINE (BENADRYL) capsule 25 mg  25 mg Oral QHS PRN    albuterol (PROVENTIL VENTOLIN) nebulizer solution 2.5 mg  2.5 mg Nebulization Q6H PRN    doxazosin (CARDURA) tablet 1 mg  1 mg Oral QHS    aspirin chewable tablet 81 mg  81 mg Oral DAILY    simvastatin (ZOCOR) tablet 20 mg  20 mg Oral QHS    lisinopril (PRINIVIL, ZESTRIL) tablet 40 mg  40 mg Oral DAILY    risperiDONE (RisperDAL) tablet 3 mg  3 mg Oral QHS    tamsulosin (FLOMAX) capsule 0.4 mg  0.4 mg Oral QHS    metoprolol succinate (TOPROL-XL) XL tablet 50 mg  50 mg Oral Q12H    pantoprazole (PROTONIX) tablet 40 mg  40 mg Oral ACB       Review of Systems  Constitutional: negative for fever, chills, sweats  Cardiovascular: negative for chest pain, palpitations, syncope, + edema  Gastrointestinal:  negative for dysphagia, reflux, vomiting, diarrhea, abdominal pain, or melena  Neurologic:  negative for focal weakness, numbness, headache    Objective:     Vitals:    03/26/17 2100 03/26/17 2119 03/27/17 0024 03/27/17 0431   BP:  124/63 123/63 129/60   Pulse:  79 86 86   Resp:  17 18 18   Temp:  97 °F (36.1 °C) 97 °F (36.1 °C) 97 °F (36.1 °C)   SpO2: 96% 100% 99% 97%   Weight:    151 lb 11.2 oz (68.8 kg)   Height:         Intake and Output:   03/25 1901 - 03/27 0700  In: 510 [P.O.:510]  Out: 850 [Urine:850]       Physical Exam:   Constitution:  the patient is well developed and in no acute distress, on 2.5L (home flow)  EENMT:  Sclera clear, pupils equal, oral mucosa moist  Respiratory: diminished bases, few crackles  Cardiovascular:  RRR without M,G,R  Gastrointestinal: soft and non-tender; with positive bowel sounds. Musculoskeletal: warm without cyanosis. There is 1+ lower leg edema. Skin:  no jaundice or rashes, no open  wounds   Neurologic: no gross neuro deficits     Psychiatric:  alert and oriented x 3     CHEST XRAY 3/26/17:       LAB  No results for input(s): GLUCPOC in the last 72 hours. No lab exists for component: GLPOC   No results for input(s): WBC, HGB, HCT, PLT, INR, HGBEXT, HCTEXT, PLTEXT, HGBEXT, HCTEXT, PLTEXT in the last 72 hours. No lab exists for component: INREXT, INREXT  Recent Labs      03/26/17   0410   NA  142   K  3.3*   CL  95*   CO2  37*   GLU  88   BUN  28*   CREA  1.69*   CA  8.1*     No results for input(s): PH, PCO2, PO2, HCO3 in the last 72 hours. No results for input(s): LCAD, LAC in the last 72 hours.       Assessment:  (Medical Decision Making)     Hospital Problems  Date Reviewed: 3/27/2017          Codes Class Noted POA    * (Principal)Acute on chronic systolic (congestive) heart failure ([facility] ICD-10-CM: I50.23  ICD-9-CM: 428.23, 428.0  12/26/2016 Yes    bnp down to 2777 from 3765     CKD (chronic kidney disease) stage 3, GFR 30-59 ml/min (Chronic) ICD-10-CM: N18.3  ICD-9-CM: 585.3  12/26/2016 Yes    Creatinine 1.69     Pleural effusion, bilateral ICD-10-CM: J90  ICD-9-CM: 511.9  12/26/2016 Yes    Thoracentesis yesterday     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 - May 2014             Hyperlipidemia (Chronic) ICD-10-CM: E78.5  ICD-9-CM: 272.4  9/13/2013 Yes        HTN (hypertension) (Chronic) ICD-10-CM: I10  ICD-9-CM: 401.9  9/13/2013 Yes        CAD (coronary artery disease) (Chronic) ICD-10-CM: I25.10  ICD-9-CM: 414.00  9/13/2013 Yes     11/2006:  LIMA-LAD, SVG-Dx, SVG-OM, SVG-RCA             COPD (chronic obstructive pulmonary disease) (HCC) (Chronic) ICD-10-CM: J44.9  ICD-9-CM: 496  9/13/2013 Yes    No wheezing           Plan:  (Medical Decision Making)     --Albuterol, Pulmicort  --continue lasix  --Continue oxygen at home flow (2.5 L continuous)   --Bmp today, follow labs   --Follow CXR     More than 50% of the time documented was spent in face-to-face contact with the patient and in the care of the patient on the floor/unit where the patient is located. Roderick Al NP          Lungs: decreased in bases. No wheezing  Heart S1 and S2 audible, no murmers or rubs appreciated  Other     Patient having difficulty expectorating. Will add mucinex and flutter valve. Has macroglossia which likely makes it harder to expectorate. I have spoken with and examined the patient. I have reviewed the history, examination, assessment, and plan and agree with the above. Marleny Velazquez MD      This note was signed electronically. Errors are unfortunately her likely due to dictation software.

## 2017-03-27 NOTE — CONSULTS
Palliative Care    Patient: Jeronimo Moore MRN: [id number]  SSN: [id number]    YOB: 1935  Age: 80 y.o. Sex: male       Date of Request: 3/27/2017  Date of Consult:  3/27/2017  Reason for Consult:  assist in chronic disease management  Requesting Physician: Nan Muir     Assessment/Plan:     Principal Diagnosis:    Dyspnea  R06.00    Additional Diagnoses:   · Cough  R05  · Counseling, Encounter for Medical Advice  Z71.9  · Encounter for Palliative Care  Z51.5    Palliative Performance Scale (PPS):  PPS: 79    Medical Decision Making:   Reviewed and summarized admission, progress notes  Discussed case with appropriate providers. Reviewed laboratory and x-ray data. CBC, CMP, ECHO, BNP    Discussed with patient at bedside. Reviewed chronic and progressive nature of CHF, likelihood that exacerbations may get more frequent. Patient's wife  2 weeks ago, he has also lost two sons. His daughter dAri Caicedo is his NOK and surrogate spokesperson. Adri Caicedo is a nurse at a local family practice. He plans to go to [facility] from here and subsequently to go to live with his daughter. His Cardiologist is Dr. Nan Muir and his family physician is Dr. Monica Leonard. Discussed with Case Management, no other Palliative Care needs identified at this time, please call if needed. Will discuss findings with members of the interdisciplinary team.      Thank you for this referral.   The Palliative Care team is available from 8 am to 4:30 pm Monday-Friday. Medical management during other hours is per the primary attending service. .    Subjective:     History obtained from:  Patient and Chart    Chief Complaint: shortness of breath  History of Present Illness:  79 y/o male admitted for exacerbation of chronic systolic heart failure. Patient with CAD, CKD, ICD implantation.      Advance Directive: Not received      Code Status:  Full Code            Health Care Power of : daughter Adri Caicedo is only child and surrogate, patient     Past Medical History:   Diagnosis Date    Arrhythmia     Arthritis     knees and hands    Atelectasis 2/5/2015    CAD (coronary artery disease)     CABG    Carotid stenosis without infarct     Chest trauma     right sided from lathe, remote    CHF (congestive heart failure) ([facility] 9/13/2013    Compensated      Chronic kidney disease     cysts in kidney    Chronic obstructive pulmonary disease (HCC)     Chronic systolic heart failure ([address] 5/30/2014    COPD (chronic obstructive pulmonary disease) ([facility] [address] 9/13/2013    Coronary atherosclerosis of native coronary vessel     Elevated hemidiaphragm     right     GERD (gastroesophageal reflux disease) 9/13/2013    Heart failure ([address]     Heart failure, left, with LVEF >40% ([facility])     60%-65% on cath 11/2006    HTN (hypertension) 9/13/2013    Hyperlipidemia 9/13/2013    Hypertension     ICD (implantable cardioverter-defibrillator) in place 5/30/2014    [facility] - May 2014     Liver disease     Other chest pain 9/13/2013    Atypical, chest tightness     Pleural effusion, left 12/2006    Prostatic hypertrophy, benign 9/13/2013    Psychiatric disorder     anxiety    Restrictive lung disease     Rheumatic aortic insufficiency     Rhinitis 2/5/2015    S/P CABG (coronary artery bypass graft)       Past Surgical History:   Procedure Laterality Date    CARDIAC SURG PROCEDURE UNLIST  11/2006    cabg x 4    HX CATARACT REMOVAL      bilateral    HX PACEMAKER  5/2014    ICD     Family History   Problem Relation Age of Onset    Hypertension Other     Stroke Other      CVA    Other Other      renal failure      Social History   Substance Use Topics    Smoking status: Former Smoker     Packs/day: 1.00     Years: 30.00     Quit date: 1/1/1989    Smokeless tobacco: Never Used    Alcohol use 1.5 oz/week     3 Cans of beer per week      Comment: occasional     Prior to Admission medications    Medication Sig Start Date End Date Taking? Authorizing Provider   lisinopril (PRINIVIL, ZESTRIL) 40 mg tablet TAKE ONE TABLET BY MOUTH ONCE DAILY 2/20/17  Yes Radha Barber MD   diphenhydrAMINE (BENADRYL) 25 mg capsule Take 25 mg by mouth nightly. Yes Historical Provider   budesonide (RHINOCORT AQUA) 32 mcg/actuation nasal spray 2 Sprays by Both Nostrils route. Yes Historical Provider   furosemide (LASIX) 40 mg tablet Take 1 Tab by mouth two (2) times a day. Patient taking differently: Take 40 mg by mouth daily. 12/30/16  Yes Davy Bustillo MD   albuterol (PROVENTIL VENTOLIN) 2.5 mg /3 mL (0.083 %) nebulizer solution 1 vial via nebulizer qid and prn;  Bill to medicare part B, dx COPD - J44.9  Indications: Chronic Obstructive Pulmonary Disease 12/14/16  Yes Orlin Pena MD   ascorbic acid (VITAMIN C) 500 mg tablet Take  by mouth. Yes Historical Provider   albuterol [facility]) 90 mcg/actuation inhaler 2 puffs qid prn 1/20/16  Yes Son White NP   budesonide-formoterol [facility]) 160-4.5 mcg/actuation HFA inhaler 2 puffs bid, rinse mouth after use 1/20/16  Yes Son White NP   Cholecalciferol, Vitamin D3, (VITAMIN D3) 1,000 unit cap Take  by mouth. Yes Historical Provider   aspirin (BABY ASPIRIN) 81 mg chewable tablet Take 81 mg by mouth daily. Yes Historical Provider   doxazosin (CARDURA) 4 mg tablet Take 1 mg by mouth nightly. 10/22/10  Yes Historical Provider   montelukast (SINGULAIR) 10 mg tablet 1 po q hs 3/15/17   Son White NP   risperiDONE (RISPERDAL) 3 mg tablet Take  by mouth nightly. Historical Provider   ZINC ACETATE PO Take  by mouth. Historical Provider   tamsulosin (FLOMAX) 0.4 mg capsule Take 1 Cap by mouth nightly. 12/30/16   Davy Bustillo MD   OXYGEN-AIR DELIVERY SYSTEMS 3 L by Nasal route daily. 3 LPM Continuous every day and QHS    Historical Provider   VITAMIN E, DL,TOCOPHERYL ACET, (VITAMIN E ACETATE) 400 unit cap capsule Take  by mouth daily.     Historical Provider   metoprolol succinate (TOPROL-XL) 50 mg XL tablet Take 1 Tab by mouth every twelve (12) hours. 5/26/16   Radha Barber MD   VIT C/VIT E/LUTEIN/MIN/OMEGA-3 (OCUVITE PO) Take  by mouth. Historical Provider   omeprazole (PRILOSEC) 20 mg capsule Take 20 mg by mouth two (2) times a day. Historical Provider   potassium chloride (KLOR-CON) 20 mEq packet Take 20 mEq by mouth daily. Historical Provider   simvastatin (ZOCOR) 20 mg tablet Take  by mouth nightly. Historical Provider       No Known Allergies     Review of Systems:  A comprehensive review of systems was negative except for: occasional cough, negative for constipation, anorexia, nausea     Objective:     Visit Vitals    /83    Pulse 83    Temp 97.5 °F (36.4 °C)    Resp 20    Ht 5' 5\" (1.651 m)    Wt 68.8 kg (151 lb 11.2 oz)    SpO2 98%    BMI 25.24 kg/m2        Physical Exam:    General:  Cooperative. No acute distress. Eyes:  Conjunctivae/corneas clear    Nose: Nares normal. Septum midline. Neck: Supple, symmetrical, trachea midline, no JVD   Lungs:   Unlabored, intermittent cough   Heart:  Regular rate and rhythm, no murmur    Abdomen:   Soft, non-tender, non-distended   Extremities: Trace LE edema   Skin: Skin color, texture, turgor normal. No rash or lesions.    Neurologic: Nonfocal   Psych: Alert and oriented      Assessment:     Hospital Problems  Date Reviewed: 3/27/2017          Codes Class Noted POA    * (Principal)Acute on chronic systolic (congestive) heart failure (HCC) ICD-10-CM: I50.23  ICD-9-CM: 428.23, 428.0  12/26/2016 Yes        CKD (chronic kidney disease) stage 3, GFR 30-59 ml/min (Chronic) ICD-10-CM: N18.3  ICD-9-CM: 585.3  12/26/2016 Yes        Pleural effusion, bilateral ICD-10-CM: J90  ICD-9-CM: 511.9  12/26/2016 Yes        ICD (implantable cardioverter-defibrillator) in place (Chronic) ICD-10-CM: Z95.810  ICD-9-CM: V45.02  5/30/2014 Yes    Overview Signed 5/30/2014 12:41 PM by Stacy Ratliff MD [facility] - May 2014             Hyperlipidemia (Chronic) ICD-10-CM: E78.5  ICD-9-CM: 272.4  9/13/2013 Yes        HTN (hypertension) (Chronic) ICD-10-CM: I10  ICD-9-CM: 401.9  9/13/2013 Yes        CAD (coronary artery disease) (Chronic) ICD-10-CM: I25.10  ICD-9-CM: 414.00  9/13/2013 Yes    Overview Signed 2/25/2016 11:32 AM by Lesli Clement MD     11/2006:  LIMA-LAD, SVG-Dx, SVG-OM, SVG-RCA             COP[facility]chronic obstructive [facility] disease) [facility] (Chronic) ICD-10-CM: J44.9  ICD-9-CM: 496  9/13/2013 Yes              Signed By: Carlee Oneal MD     March 27, 2017

## 2017-03-27 NOTE — PROGRESS NOTES
Problem: Mobility Impaired (Adult and Pediatric)  Goal: *Acute Goals and Plan of Care (Insert Text)  1. Mr. Beverly Harrison will perform supine to sit and sit to supine independently in 7 days. 2. Mr. Beverly Harrison will perform sit to stand and bed to chair independently in 7 days. 3. Mr. Beverly Harrison will perform gait with least restrictive device 250 ft independently in 7 days. 4. Mr. Beverly Harrison will perform up and down 4 steps with rail independently in 7 days. PHYSICAL THERAPY: Daily Note, Treatment Day: 2nd and AM 3/27/2017  INPATIENT: Hospital Day: 5  [place] / [place] / Product Type: Medicare /      NAME/AGE/GENDER: Fer Morgan is a 80 y.o. male     PRIMARY DIAGNOSIS: Pleural effusion [J90] Acute on chronic systolic (congestive) heart failure (HCC) Acute on chronic systolic (congestive) heart failure (HCC)  Procedure(s) (LRB):  THORACENTESIS (Left)  ULTRASOUND (Bilateral)  1 Day Post-Op  ICD-10: Treatment Diagnosis:       · Generalized Muscle Weakness (M62.81)  · Difficulty in walking, Not elsewhere classified (R26.2)   Precaution/Allergies:  Review of patient's allergies indicates no known allergies. ASSESSMENT:      Mr. Beverly Harrison is supine in bed upon contact and agreeable to PT. Pt requested to return to bed following treatment due to sitting up in chair already this morning. Pt performed exercises in bed for LE strengthening. Pt ambulated in room for 20 ft with no AD and CGA. Pt returned to supine in bed with all needs met and within reach. Pt demonstrated improvements in independence with gait and mobility this session. This section established at most recent assessment   PROBLEM LIST (Impairments causing functional limitations):  1. Decreased Strength  2. Decreased Transfer Abilities  3. Decreased Ambulation Ability/Technique  4. Decreased Activity Tolerance    INTERVENTIONS PLANNED: (Benefits and precautions of physical therapy have been discussed with the patient.)  1.  Bed Mobility  2. Therapeutic Activites  3. Therapeutic Exercise/Strengthening  4. Transfer Training      TREATMENT PLAN: Frequency/Duration: 3 times a week for duration of hospital stay  Rehabilitation Potential For Stated Goals: GOOD      RECOMMENDED REHABILITATION/EQUIPMENT: (at time of discharge pending progress): Continue Skilled Therapy and Rehab. HISTORY:   History of Present Injury/Illness (Reason for Referral): Kiarra Mendez is a 80 y.o. male with a PMH of CAD with CABG in 2006, HTN, chronic systolic HF (last EF 42-61% 6/2016), GERD, COPD, O2 prn and [facility] Dual chamber ICD, who presented with progressive dyspnea, MARSHALL, LE edema, weakness, and 14lb wgt gain. In ED labs showed BNP of 3765, creatinine of 1.96 (baseline around 1.7), and CXR shows bilateral pleural effusions. He was hypoxic on arrival with sats in 80s and was placed on 2L NC. Upon exam the patient is using accessory muscles to breath and has +4 pitting edema. He denies chest pain, palpitations, nausea, vomiting, fever or chills. He received 40 mg of IV lasix in the ED. Past Medical History/Comorbidities:   Mr. Eddie Bey  has a past medical history of Arrhythmia; Arthritis; Atelectasis (2/5/2015); CAD (coronary artery disease); Carotid stenosis without infarct; Chest trauma; CHF (congestive heart failure) ([address] (9/13/2013); Chronic kidney disease; Chronic obstructive pulmonary disease ([address] Chronic systolic heart failure (HCC) (5/30/2014); COPD (chronic obstructive pulmonary disease) ([address] (9/13/2013); Coronary atherosclerosis of native coronary vessel; Elevated hemidiaphragm; GERD (gastroesophageal reflux disease) (9/13/2013); Heart failure ([address] Heart failure, left, with LVEF >40% ([address] HTN (hypertension) (9/13/2013); Hyperlipidemia (9/13/2013); Hypertension; ICD (implantable cardioverter-defibrillator) in place (5/30/2014); Liver disease; Other chest pain (9/13/2013); Pleural effusion, left (12/2006);  Prostatic hypertrophy, benign (9/13/2013); Psychiatric disorder; Restrictive lung disease; Rheumatic aortic insufficiency; Rhinitis (2/5/2015); and S/P CABG (coronary artery bypass graft). Mr. Bautista Gilliland  has a past surgical history that includes cardiac surg procedure unlist (11/2006); pacemaker (5/2014); and cataract removal.  Social History/Living Environment:   Home Environment: Private residence  # Steps to Enter: 4  One/Two Story Residence: One story  Living Alone: Yes  Support Systems: Child(erick)  Patient Expects to be Discharged to[de-identified] Private residence  Current DME Used/Available at Home: Oxygen, portable  Tub or Shower Type: Tub/Shower combination  Prior Level of Function/Work/Activity:  independent  age, CHF, oxygen use   Number of Personal Factors/Comorbidities that affect the Plan of Care: 3+: HIGH COMPLEXITY   EXAMINATION:   Most Recent Physical Functioning:   Gross Assessment:  AROM: Generally decreased, functional  Strength: Generally decreased, functional               Posture:     Balance:  Sitting: Intact; Without support  Standing - Static: Fair  Standing - Dynamic : Fair Bed Mobility:  Supine to Sit: Stand-by asssistance  Sit to Supine: Stand-by asssistance  Wheelchair Mobility:     Transfers:  Sit to Stand: Contact guard assistance  Stand to Sit: Contact guard assistance  Gait:     Base of Support: Widened  Speed/Lyla: Slow;Shuffled  Step Length: Left shortened;Right shortened  Gait Abnormalities: Decreased step clearance;Shuffling gait  Distance (ft): 20 Feet (ft)  Assistive Device:  (none )  Ambulation - Level of Assistance: Contact guard assistance  Interventions: Safety awareness training;Verbal cues; Tactile cues       Body Structures Involved:  1. Muscles Body Functions Affected:  1. Movement Related Activities and Participation Affected:  1. Mobility  2.  Domestic Life   Number of elements that affect the Plan of Care: 4+: HIGH COMPLEXITY   CLINICAL PRESENTATION:   Presentation: Evolving clinical presentation with changing clinical characteristics: MODERATE COMPLEXITY   CLINICAL DECISION MAKIN [facility] Inpatient Short Form  How much difficulty does the patient currently have. .. Unable A Lot A Little None   1. Turning over in bed (including adjusting bedclothes, sheets and blankets)? [ ] 1   [ ] 2   [X] 3   [ ] 4   2. Sitting down on and standing up from a chair with arms ( e.g., wheelchair, bedside commode, etc.)   [ ] 1   [ ] 2   [X] 3   [ ] 4   3. Moving from lying on back to sitting on the side of the bed? [ ] 1   [ ] 2   [X] 3   [ ] 4   How much help from another person does the patient currently need. .. Total A Lot A Little None   4. Moving to and from a bed to a chair (including a wheelchair)? [ ] 1   [ ] 2   [X] 3   [ ] 4   5. Need to walk in hospital room? [ ] 1   [ ] 2   [X] 3   [ ] 4   6. Climbing 3-5 steps with a railing? [ ] 1   [ ] 2   [X] 3   [ ] 4   © , Trustees of [address] [place] [address], under license to [employer]. All rights reserved    Score:  Initial: 18 Most Recent: X (Date: -- )     Interpretation of Tool:  Represents activities that are increasingly more difficult (i.e. Bed mobility, Transfers, Gait). Score 24 23 22-20 19-15 14-10 9-7 6       Modifier CH CI CJ CK CL CM CN         · Mobility - Walking and Moving Around:               - CURRENT STATUS:    CK - 40%-59% impaired, limited or restricted               - GOAL STATUS:           CK - 40%-59% impaired, limited or restricted               - D/C STATUS:                       CK - 40%-59% impaired, limited or restricted  [place] / Product Type: Medicare /       Medical Necessity:     · Patient is expected to demonstrate progress in functional technique to increase independence with mobility and gait. .  Reason for Services/Other Comments:  · Patient continues to require present interventions due to patient's inability to function at baseline. Use of outcome tool(s) and clinical judgement create a POC that gives a: Questionable prediction of patient's progress: MODERATE COMPLEXITY                 TREATMENT:   (In addition to Assessment/Re-Assessment sessions the following treatments were rendered)   Pre-treatment Symptoms/Complaints:  none  Pain: Initial:     0/10 Post Session:  0/10      Therapeutic Activity: (   12 minutes): Therapeutic activities including Bed transfers, Ambulation on level ground and therapeutic exercises to improve mobility, strength, balance and coordination. Required minimal Safety awareness training;Verbal cues; Tactile cues to promote static and dynamic balance in standing. Date:  3/27/17 Date:   Date:     Activity/Exercise Parameters Parameters Parameters   Ankle pumps 15 X B     Quad set 15 X B     Glute set 15 X B     Heel slides 15 X B     Hip abduction 15 X B                        Braces/Orthotics/Lines/Etc:   · IV  · O2 Device: Nasal cannula  Treatment/Session Assessment:    · Response to Treatment:  good  · Interdisciplinary Collaboration:  · Registered Nurse  · After treatment position/precautions:  · Supine in bed, Bed/Chair-wheels locked, Bed in low position, Call light within reach, RN notified and Side rails x 3  · Compliance with Program/Exercises: Will assess as treatment progresses. · Recommendations/Intent for next treatment session: \"Next visit will focus on advancements to more challenging activities and reduction in assistance provided\".   Total Treatment Duration: 14 minutes  PT Patient Time In/Time Out  Time In: 1054  Time Out: [address]

## 2017-03-27 NOTE — ROUTINE PROCESS
CHF teaching continues to pt: emphasis to daily WTs and report WT gain, dyspnea, edema,2 L/D  FR and NA restrictions: 20mins

## 2017-03-28 NOTE — PROGRESS NOTES
[facility] CARDIOLOGY PROGRESS NOTE           3/28/2017 8:32 AM    Admit Date: 3/23/2017      Subjective:   Patient much less dyspneic. He has had issues with thick sputum and coughing. ROS:  Cardiovascular:  As noted above    Objective:      Vitals:    03/27/17 1959 03/28/17 0055 03/28/17 0425 03/28/17 0745   BP: 111/51 122/54 131/68 119/56   Pulse: 77 60 60 60   Resp: 18 18 18 19   Temp: 97.1 °F (36.2 °C) 97.2 °F (36.2 °C) 97 °F (36.1 °C) 97.6 °F (36.4 °C)   SpO2: 99% 99% 99% 97%   Weight:   67 kg (147 lb 12.8 oz)    Height:           Physical Exam:  General-No Acute Distress  Neck- supple, no JVD  CV- regular rate and rhythm no MRG  Lung- crackles bilaterally  Abd- soft, nontender, nondistended  Ext- no edema bilaterally. Skin- warm and dry    Data Review:   Recent Labs      03/28/17   0515  03/27/17   1010   NA  139  137   K  3.9  4.1   MG  2.3  2.3   BUN  26*  22   CREA  1.60*  1.58*   GLU  69  96   WBC  5.1  4.4   HGB  13.5*  13.2*   HCT  43.7  42.9   PLT  114*  123*       Assessment/Plan:     Principal Problem:    Acute on chronic systolic (congestive) heart failure (HCC) - restart PO lasix 80mg BID. Medical therapy appropriate. Check labs in 3 days and needs TC-7. PPD placed and may need STR. He is stable to DC to STR if bed available.       Active Problems:    Hyperlipidemia (9/13/2013) - On statin       HTN (hypertension) (9/13/2013) - This is controlled      CAD (coronary artery disease) (9/13/2013) - No angina      Overview: 11/2006:  LIMA-LAD, SVG-Dx, SVG-OM, SVG-RCA      COPD (chronic obstructive pulmonary disease) ([facility] (9/13/2013)      ICD (implantable cardioverter-defibrillator) in place (5/30/2014)      Overview: [facility] - May 2014      CKD (chronic kidney disease) stage 3, GFR 30-59 ml/min (12/26/2016) - This stable and tolerating diuresis      Pleural effusion, bilateral (12/26/2016) - S/P (L)thoracentesis          Sully Shaffer MD  3/28/2017 8:32 AM

## 2017-03-28 NOTE — DISCHARGE SUMMARY
[facility] Cardiology Discharge Summary     Patient ID:  Yasmeen Marx  [id number]  69 y.o.  1935    Admit date: 3/23/2017    Discharge date:  3/28/17    Admitting Physician: Camden Hirsch MD     Discharge Physician: Renzo Murillo NP/Dr. Smooth Graves    Admission Diagnoses: Pleural effusion [J90]    Discharge Diagnoses:   Patient Active Problem List    Diagnosis Date Noted    Acute on chronic systolic (congestive) heart failure ([address] 12/26/2016    CKD (chronic kidney disease) stage 3, GFR 30-59 ml/min 12/26/2016    Pleural effusion, bilateral 12/26/2016    Acute respiratory failure ([address] 12/26/2016    Urinary retention 12/26/2016    Pulmonary emphysema ([address] 11/02/2016    Hypoxia 11/02/2016    Nonrheumatic aortic valve insufficiency     Carotid stenosis without infarct     S/P CABG (coronary artery bypass graft)     Coronary atherosclerosis of native coronary vessel     Rhinitis 02/05/2015    Elevated hemidiaphragm     Chronic systolic heart failure ([facility] 05/30/2014    ICD (implantable cardioverter-defibrillator) in place 05/30/2014    Hyperlipidemia 09/13/2013    HTN (hypertension) 09/13/2013    CAD (coronary artery disease) 09/13/2013    CHF (congestive heart failure) ([facility] 09/13/2013    COPD (chronic obstructive pulmonary disease) ([facility] 09/13/2013    GERD (gastroesophageal reflux disease) 09/13/2013    Prostatic hypertrophy, benign 09/13/2013       Cardiology Procedures this admission:  EchoCardiogram  Consults: Pulmonary/Intensive care     Hospital Course: Patient presented to the emergency department of [facility] with complaints of progressive shortness of breath and lower extremity edema. Patient was evaluated and subsequently admitted for further cardiac evaluation and treatment. The patient was started on Lasix gtt for diuresis, switched to IV and then po prior to discharge. He diuresed well (total 7.3 liters) and felt better.   An echocardiogram was performed with report as follows: -  Left ventricle: The ventricle was mildly dilated. Systolic function was moderately to markedly reduced. Ejection fraction was estimated in the range of 25 % to 30 %. There was moderate diffuse hypokinesis with regional variations. Right ventricle: Systolic function was reduced. There was mild pulmonary artery hypertension. There was moderate pulmonary artery hypertension. Left atrium: The atrium was mildly dilated. Aortic valve: There was moderate to severe regurgitation. Mitral valve: There was moderate regurgitation. Tricuspid valve: There was mild regurgitation. Aorta, systemic arteries: The root exhibited mild dilatation. Pulmonary was consulted and s/p left thoracentesis (300ml removed). His BNP prior to d/c was 1757 (prior 3765). The morning of 3/28/17, the patient was up feeling well without any complaints shortness of breath. LE edema was much improved. Patient's labs were stable with creatinine of 1.60. Patient was seen and examined by Dr. Martha Lam and determined stable and ready for discharge. Patient was instructed on the importance of medication compliance, low sodium diet, 2 liter per day fluid restriction and daily weights. For maximized medical therapy of congestive heart failure, patient will continue use of BB and ACE-I. The patient will be d/c to [facility] today [facility]). The patient will have close transitional care follow up (TC-7) with [facility] Cardiology Dr. Daryle Balo  On 3/31/17 at 1:30 pm in [place] office. The patient will have labs prior to appt (BNP and BMP). DISPOSITION: The patient is being discharged home in stable condition on a low saturated fat, low cholesterol and low salt diet. The patient is instructed to advance activities as tolerated to the limit of fatigue or shortness of breath. The patient is informed to monitor daily weights and maintain a 2 liter per day fluid restriction.   The patient is instructed to call the office for any shortness of breath, weight gain, or increased peripheral edema. Discharge Exam:   Visit Vitals    /56 (BP 1 Location: Left arm, BP Patient Position: At rest)    Pulse 60    Temp 97.6 °F (36.4 °C)    Resp 19    Ht 5' 5\" (1.651 m)    Wt 67 kg (147 lb 12.8 oz)    SpO2 98%    BMI 24.6 kg/m2     Patient has been seen by Dr. Beti Castano: see his progress note for exam details. Recent Results (from the past 24 hour(s))   METABOLIC PANEL, BASIC    Collection Time: 03/27/17 10:10 AM   Result Value Ref Range    Sodium 137 136 - 145 mmol/L    Potassium 4.1 3.5 - 5.1 mmol/L    Chloride 95 (L) 98 - 107 mmol/L    CO2 35 (H) 21 - 32 mmol/L    Anion gap 7 7 - 16 mmol/L    Glucose 96 65 - 100 mg/dL    BUN 22 8 - 23 MG/DL    Creatinine 1.58 (H) 0.8 - 1.5 MG/DL    GFR est AA 54 (L) >60 ml/min/1.73m2    GFR est non-AA 45 (L) >60 ml/min/1.73m2    Calcium 8.6 8.3 - 10.4 MG/DL   CBC WITH AUTOMATED DIFF    Collection Time: 03/27/17 10:10 AM   Result Value Ref Range    WBC 4.4 4.3 - 11.1 K/uL    RBC 4.52 4.23 - 5.67 M/uL    HGB 13.2 (L) 13.6 - 17.2 g/dL    HCT 42.9 41.1 - 50.3 %    MCV 94.9 79.6 - 97.8 FL    MCH 29.2 26.1 - 32.9 PG    MCHC 30.8 (L) 31.4 - 35.0 g/dL    RDW 14.7 (H) 11.9 - 14.6 %    PLATELET 016 (L) 586 - 450 K/uL    MPV 11.1 10.8 - 14.1 FL    DF AUTOMATED      NEUTROPHILS 71 43 - 78 %    LYMPHOCYTES 20 13 - 44 %    MONOCYTES 7 4.0 - 12.0 %    EOSINOPHILS 2 0.5 - 7.8 %    BASOPHILS 0 0.0 - 2.0 %    IMMATURE GRANULOCYTES 0.2 0.0 - 5.0 %    ABS. NEUTROPHILS 3.1 1.7 - 8.2 K/UL    ABS. LYMPHOCYTES 0.9 0.5 - 4.6 K/UL    ABS. MONOCYTES 0.3 0.1 - 1.3 K/UL    ABS. EOSINOPHILS 0.1 0.0 - 0.8 K/UL    ABS. BASOPHILS 0.0 0.0 - 0.2 K/UL    ABS. IMM.  GRANS. 0.0 0.0 - 0.5 K/UL   MAGNESIUM    Collection Time: 03/27/17 10:10 AM   Result Value Ref Range    Magnesium 2.3 1.8 - 2.4 mg/dL   BNP    Collection Time: 03/27/17 10:10 AM   Result Value Ref Range    BNP 1668 pg/mL   PLEASE READ & DOCUMENT PPD TEST IN 72 HRS    Collection Time: 03/27/17 12:04 PM   Result Value Ref Range    PPD negative Negative    mm Induration 0 mm   METABOLIC PANEL, BASIC    Collection Time: 03/28/17  5:15 AM   Result Value Ref Range    Sodium 139 136 - 145 mmol/L    Potassium 3.9 3.5 - 5.1 mmol/L    Chloride 96 (L) 98 - 107 mmol/L    CO2 34 (H) 21 - 32 mmol/L    Anion gap 9 7 - 16 mmol/L    Glucose 69 65 - 100 mg/dL    BUN 26 (H) 8 - 23 MG/DL    Creatinine 1.60 (H) 0.8 - 1.5 MG/DL    GFR est AA 54 (L) >60 ml/min/1.73m2    GFR est non-AA 44 (L) >60 ml/min/1.73m2    Calcium 8.5 8.3 - 10.4 MG/DL   CBC WITH AUTOMATED DIFF    Collection Time: 03/28/17  5:15 AM   Result Value Ref Range    WBC 5.1 4.3 - 11.1 K/uL    RBC 4.63 4.23 - 5.67 M/uL    HGB 13.5 (L) 13.6 - 17.2 g/dL    HCT 43.7 41.1 - 50.3 %    MCV 94.4 79.6 - 97.8 FL    MCH 29.2 26.1 - 32.9 PG    MCHC 30.9 (L) 31.4 - 35.0 g/dL    RDW 14.8 (H) 11.9 - 14.6 %    PLATELET 519 (L) 854 - 450 K/uL    MPV 11.1 10.8 - 14.1 FL    DF AUTOMATED      NEUTROPHILS 63 43 - 78 %    LYMPHOCYTES 23 13 - 44 %    MONOCYTES 11 4.0 - 12.0 %    EOSINOPHILS 3 0.5 - 7.8 %    BASOPHILS 0 0.0 - 2.0 %    IMMATURE GRANULOCYTES 0.2 0.0 - 5.0 %    ABS. NEUTROPHILS 3.1 1.7 - 8.2 K/UL    ABS. LYMPHOCYTES 1.2 0.5 - 4.6 K/UL    ABS. MONOCYTES 0.6 0.1 - 1.3 K/UL    ABS. EOSINOPHILS 0.2 0.0 - 0.8 K/UL    ABS. BASOPHILS 0.0 0.0 - 0.2 K/UL    ABS. IMM. GRANS. 0.0 0.0 - 0.5 K/UL   MAGNESIUM    Collection Time: 03/28/17  5:15 AM   Result Value Ref Range    Magnesium 2.3 1.8 - 2.4 mg/dL   BNP    Collection Time: 03/28/17  5:15 AM   Result Value Ref Range    BNP 1757 pg/mL         Patient Instructions:   Current Discharge Medication List      START taking these medications    Details   guaiFENesin ER (MUCINEX) 1,200 mg Ta12 ER tablet Take 1 Tab by mouth every twelve (12) hours. Qty: 60 Tab, Refills: 1         CONTINUE these medications which have CHANGED    Details   furosemide (LASIX) 80 mg tablet Take 1 Tab by mouth two (2) times a day.   Qty: 60 Tab, Refills: 11         CONTINUE these medications which have NOT CHANGED    Details   lisinopril (PRINIVIL, ZESTRIL) 40 mg tablet TAKE ONE TABLET BY MOUTH ONCE DAILY  Qty: 30 Tab, Refills: 0      diphenhydrAMINE (BENADRYL) 25 mg capsule Take 25 mg by mouth nightly. budesonide (RHINOCORT AQUA) 32 mcg/actuation nasal spray 2 Sprays by Both Nostrils route. albuterol (PROVENTIL VENTOLIN) 2.5 mg /3 mL (0.083 %) nebulizer solution 1 vial via nebulizer qid and prn;  Bill to medicare part B, dx COPD - J44.9  Indications: Chronic Obstructive Pulmonary Disease  Qty: 120 Each, Refills: 11    Comments: Dispense #120 vials  Associated Diagnoses: Chronic obstructive pulmonary disease, unspecified COPD type ([facility])      ascorbic acid (VITAMIN C) 500 mg tablet Take  by mouth. albuterol (PROAIR HFA) 90 mcg/actuation inhaler 2 puffs qid prn  Qty: 1 Inhaler, Refills: 11      budesonide-formoterol (SYMBICORT) 160-4.5 mcg/actuation HFA inhaler 2 puffs bid, rinse mouth after use  Qty: 1 Inhaler, Refills: 11      Cholecalciferol, Vitamin D3, (VITAMIN D3) 1,000 unit cap Take  by mouth. aspirin (BABY ASPIRIN) 81 mg chewable tablet Take 81 mg by mouth daily. doxazosin (CARDURA) 4 mg tablet Take 1 mg by mouth nightly. montelukast (SINGULAIR) 10 mg tablet 1 po q hs  Qty: 30 Tab, Refills: 11      risperiDONE (RISPERDAL) 3 mg tablet Take  by mouth nightly. ZINC ACETATE PO Take  by mouth. tamsulosin (FLOMAX) 0.4 mg capsule Take 1 Cap by mouth nightly. Qty: 30 Cap, Refills: 6      OXYGEN-AIR DELIVERY SYSTEMS 3 L by Nasal route daily. 3 LPM Continuous every day and QHS    Associated Diagnoses: Hypoxia; Pulmonary emphysema, unspecified emphysema type ([facility] Other rhinitis      VITAMIN E, DL,TOCOPHERYL ACET, (VITAMIN E ACETATE) 400 unit cap capsule Take  by mouth daily. metoprolol succinate (TOPROL-XL) 50 mg XL tablet Take 1 Tab by mouth every twelve (12) hours.   Qty: 180 Tab, Refills: 3    Associated Diagnoses: Chronic systolic heart failure (HCC)      VIT C/VIT E/LUTEIN/MIN/OMEGA-3 (OCUVITE PO) Take  by mouth. omeprazole (PRILOSEC) 20 mg capsule Take 20 mg by mouth two (2) times a day. simvastatin (ZOCOR) 20 mg tablet Take  by mouth nightly.          STOP taking these medications       potassium chloride (KLOR-CON) 20 mEq packet Comments:   Reason for Stopping:                 Signed:  KAYDEN Malik  3/28/2017  9:13 AM  Herman Ornelas MD

## 2017-03-28 NOTE — ROUTINE PROCESS
CHF teaching started post introduction to pt/family via phone; aware of diagnosis. Planner/scale (home)@ BS and will follow. Smoking/ ETOH/Illicit drug use cessation covered. Pt/family aware that I can not prescribe nor adjust  medications: 15mins  Palliative Care score: ssen  Start 2 L/D FR  CHF teaching continues to pt/family. Emphasis on taking prescription meds as ordered, to keep F/U appts and to call MD STAT . CHF teaching completed, verbalize emphasis on monitoring self and report to MD:   If you gain 2 lbs in one day or 5 lbs in a week, and short of breath.  If you can not lay flat without developing short of breath or rapid breathing at night; or if it wakes you up. Develop a cough or wheezing.  If you notice swollen hands/feet/ankles or stomach with a bloated/ full feeling.  If you become confused or mentally fuzzy or dizzy.  If you notice a rapid or change in your heart rate.  If you become more exhausted all the time and unable to do the same level of activity without stopping to catch your breath. Drink no more than 8 cups a day in 8 oz. cups. Limit Cola Drinks. Your Heart can not handle any more. Stay away from salt (limit anything with salt or sodium in it). Limit to 250mg per serving. Exercise needs to be started with your Doctors approval.  Reduce stress  Pass post test via teach back, will make self available post DC ,if an questions arise. Diabetic teaching completed.  Planner/scale @ BS:  60 mins total

## 2017-03-28 NOTE — PROGRESS NOTES
's visit attempted. Spoke with patient briefly; however, the Mr. Nghia Horn took a phone call. I discontinued the visit.       Tray Cevallos 68  Board Certified

## 2017-03-28 NOTE — PROGRESS NOTES
Care Management Interventions  PCP Verified by CM: Yes  Mode of Transport at Discharge: Other (see comment)  Transition of Care Consult (CM Consult): SNF  Physical Therapy Consult: Yes  Current Support Network: [address] [place] [address]  Confirm Follow Up Transport: Other (see comment) Dajuan Perez  Plan discussed with Pt/Family/Caregiver: Yes  Freedom of Choice Offered: Yes     DC to the [facility] via [facility]. One PM . Gwendolyn Holley    Notified daughter and she will meet patient at the facility this afternoon.

## 2017-03-28 NOTE — PROGRESS NOTES
Sylvia Clemente  Admission Date: 3/23/2017             Daily Progress Note: 3/28/2017    The patient's chart is reviewed and the patient is discussed with the staff. 79 yo male with a history of CAD with CABG in 2006 with EF 30-35% on 6/2016, COPD, HTN, chronic systolic heart failure, and CKD stage 3. Last PFT on 7/10/2016 with FVC 60%, FEV1 51%, FEV1/FVC 83%. He is managed with Symbicort, Albuterol via nebulizer prn, and oxygen. He uses oxygen at 2L NC every night and as needed during the day. He has recently had to use his oxygen more frequently during the day. Seen by Pulmonary the day prior to admission with BNP 2765 and creat 1.96 and was advised to go to the ER for further evaluation. In ER patient was hypoxic and CXR with bilateral effusions. He was admitted by Cardiology and aggressively diuresed. 3/26 Thoracentesis Left, 300ml removed     Subjective:     Resting in bed, on 2L NC.  Was eating breakfast   Cough with clear sputum     Current Facility-Administered Medications   Medication Dose Route Frequency    furosemide (LASIX) tablet 80 mg  80 mg Oral Q12H    guaiFENesin ER (MUCINEX) tablet 1,200 mg  1,200 mg Oral Q12H    albuterol CONCENTRATE 2.5mg/0.5 mL neb soln  2.5 mg Nebulization Q6HWA RT    And    budesonide (PULMICORT) 500 mcg/2 ml nebulizer suspension  500 mcg Nebulization BID RT    diphenhydrAMINE (BENADRYL) capsule 25 mg  25 mg Oral QHS PRN    albuterol (PROVENTIL VENTOLIN) nebulizer solution 2.5 mg  2.5 mg Nebulization Q6H PRN    doxazosin (CARDURA) tablet 1 mg  1 mg Oral QHS    aspirin chewable tablet 81 mg  81 mg Oral DAILY    simvastatin (ZOCOR) tablet 20 mg  20 mg Oral QHS    lisinopril (PRINIVIL, ZESTRIL) tablet 40 mg  40 mg Oral DAILY    risperiDONE (RisperDAL) tablet 3 mg  3 mg Oral QHS    tamsulosin (FLOMAX) capsule 0.4 mg  0.4 mg Oral QHS    metoprolol succinate (TOPROL-XL) XL tablet 50 mg  50 mg Oral Q12H    pantoprazole (PROTONIX) tablet 40 mg 40 mg Oral ACB       Review of Systems  Constitutional: negative for fever, chills, sweats  Cardiovascular: negative for chest pain, palpitations, syncope, + edema  Gastrointestinal:  negative for dysphagia, reflux, vomiting, diarrhea, abdominal pain, or melena  Neurologic:  negative for focal weakness, numbness, headache    Objective:     Vitals:    03/27/17 1959 03/28/17 0055 03/28/17 0425 03/28/17 0745   BP: 111/51 122/54 131/68 119/56   Pulse: 77 60 60 60   Resp: 18 18 18 19   Temp: 97.1 °F (36.2 °C) 97.2 °F (36.2 °C) 97 °F (36.1 °C) 97.6 °F (36.4 °C)   SpO2: 99% 99% 99% 97%   Weight:   147 lb 12.8 oz (67 kg)    Height:         Intake and Output:   03/26 1901 - 03/28 0700  In: 1155 [P.O.:1155]  Out: 3150 [Urine:3150]  03/28 0701 - 03/28 1900  In: -   Out: 250 [Urine:250]    Physical Exam:   Constitution:  the patient is well developed and in no acute distress, on 2L (home flow)  EENMT:  Sclera clear, pupils equal, oral mucosa moist  Respiratory: diminished, no wheezing   Cardiovascular:  RRR without M,G,R  Gastrointestinal: soft and non-tender; with positive bowel sounds. Musculoskeletal: warm without cyanosis. There is 1+ lower leg edema. Skin:  no jaundice or rashes, no open wounds   Neurologic: no gross neuro deficits     Psychiatric:  alert and oriented x 3     CHEST XRAY 3/27/17:     Findings: There is a pacemaker. Metallic sternal wires are present. Heart is  upper limits normal in size and mediastinum unremarkable. Vascularity appears  normal. No pulmonary infiltrate. Small right pleural effusion appears stable.     IMPRESSION  Impression: No significant change    LAB  No results for input(s): GLUCPOC in the last 72 hours.     No lab exists for component: [place]   Recent Labs      03/28/17   0515  03/27/17   1010   WBC  5.1  4.4   HGB  13.5*  13.2*   HCT  43.7  42.9   PLT  114*  123*     Recent Labs      03/28/17   0515  03/27/17   1010  03/26/17   0410   NA  139  137  142   K  3.9  4.1  3.3*   CL  96* 95*  95*   CO2  34*  35*  37*   GLU  69  96  88   BUN  26*  22  28*   CREA  1.60*  1.58*  1.69*   MG  2.3  2.3   --    CA  8.5  8.6  8.1*     No results for input(s): PH, PCO2, PO2, HCO3 in the last 72 hours. No results for input(s): LCAD, LAC in the last 72 hours. Assessment:  (Medical Decision Making)     Hospital Problems  Date Reviewed: 3/27/2017          Codes Class Noted POA    * (Principal)Acute on chronic systolic (congestive) heart failure (HCC) ICD-10-CM: I50.23  ICD-9-CM: 428.23, 428.0  12/26/2016 Yes    bnp 1757 today     CKD (chronic kidney disease) stage 3, GFR 30-59 ml/min (Chronic) ICD-10-CM: N18.3  ICD-9-CM: 585.3  12/26/2016 Yes    Creatinine 1.60    Pleural effusion, bilateral ICD-10-CM: J90  ICD-9-CM: 511.9  12/26/2016 Yes    Thoracentesis 3/26    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 - May 2014             Hyperlipidemia (Chronic) ICD-10-CM: E78.5  ICD-9-CM: 272.4  9/13/2013 Yes        HTN (hypertension) (Chronic) ICD-10-CM: I10  ICD-9-CM: 401.9  9/13/2013 Yes        CAD (coronary artery disease) (Chronic) ICD-10-CM: I25.10  ICD-9-CM: 414.00  9/13/2013 Yes     11/2006:  LIMA-LAD, SVG-Dx, SVG-OM, SVG-RCA             COPD (chronic obstructive pulmonary disease) (HCC) (Chronic) ICD-10-CM: J44.9  ICD-9-CM: 496  9/13/2013 Yes    No wheezing           Plan:  (Medical Decision Making)     --Albuterol, Pulmicort, mucinex, flutter valve   --Lasix 80mg bid restarted per cardiology    BNP 1757 today   --Continue oxygen at home flow (2.5 L continuous)     More than 50% of the time documented was spent in face-to-face contact with the patient and in the care of the patient on the floor/unit where the patient is located. Dixie Chapa NP        Lungs:  clear  Heart:  RRR with no Murmur/Rubs/Gallops    Additional Comments:   Will follow up as outpatient   Stable from pulmonary stand point    I have spoken with and examined the patient. I agree with the above assessment and plan as documented.     Yolanda Lea MD

## 2017-03-28 NOTE — PROGRESS NOTES
Problem: Falls - Risk of  Goal: *Absence of falls  Outcome: Progressing Towards Goal  Fall precautions in place. Bed alarm on. Yellow socks on. Instructed to only get OOB with assistance. Voiced understanding. Call light in reach. Goal: *Knowledge of fall prevention  Outcome: Progressing Towards Goal  Pt educated on fall prevention and to use call light for assistance. Pt verbalizes understanding and will use call light for assistance. Problem: Pressure Ulcer - Risk of  Goal: *Prevention of pressure ulcer  Outcome: Progressing Towards Goal    03/27/17 2010   Wound Prevention and Protection Methods   Orientation of Wound Prevention Mid;Posterior   Location of Wound Prevention Sacrum/Coccyx   Dressing Present  Yes; Intact, not due to be changed   Dressing Status Intact   Read Only, Retired: Wound Treatment (non-mechanical)   Wound Offloading (Prevention Methods) Bed, pressure reduction mattress;Pillows;Repositioning;Turning

## 2017-03-28 NOTE — PROGRESS NOTES
Verbal bedside report given to [facility], oncoming RN. Patient's situation, background, assessment and recommendations provided. Opportunity for questions provided. Oncoming RN assumed care of patient.

## 2017-03-28 NOTE — PROGRESS NOTES
TRANSFER - OUT REPORT:    Verbal report given to Reggie Luna RN on Render Mort  being transferred to [facility] for routine progression of care       Report consisted of patients Situation, Background, Assessment and   Recommendations(SBAR). Information from the following report(s) SBAR, Kardex, Intake/Output, MAR and Recent Results was reviewed with the receiving nurse. Lines:       Opportunity for questions and clarification was provided. Patient and daughter aware of upcoming transfer. Patient's $242.00 returned to him from the security safe, belongings inventory signed and placed on chart. Patient dressed and packed for transfer.

## 2017-03-29 PROBLEM — I50.9 CONGESTIVE HEART FAILURE (HCC): Chronic | Status: ACTIVE | Noted: 2017-01-01

## 2017-03-29 NOTE — PROGRESS NOTES
Per [facility] patient discharged to [facility] ([facility]) 03/28/2017, follow up call scheduled in 21 days post acute discharge to home. No JOHNATHON Outreach This note will not be viewable in MyChart.

## 2017-06-08 PROBLEM — I48.0 PAROXYSMAL ATRIAL FIBRILLATION (HCC): Chronic | Status: ACTIVE | Noted: 2017-01-01

## 2017-07-11 NOTE — PERIOP NOTES
Recent Results (from the past 12 hour(s))   URINALYSIS W/ RFLX MICROSCOPIC    Collection Time: 07/11/17  2:20 PM   Result Value Ref Range    Color YELLOW      Appearance CLEAR      Specific gravity 1.006 1.001 - 1.023      pH (UA) 6.0 5.0 - 9.0      Protein NEGATIVE  NEG mg/dL    Glucose NEGATIVE  mg/dL    Ketone NEGATIVE  NEG mg/dL    Bilirubin NEGATIVE  NEG      Blood NEGATIVE  NEG      Urobilinogen 0.2 0.2 - 1.0 EU/dL    Nitrites NEGATIVE  NEG      Leukocyte Esterase NEGATIVE  NEG     CBC W/O DIFF    Collection Time: 07/11/17  2:30 PM   Result Value Ref Range    WBC 5.7 4.3 - 11.1 K/uL    RBC 4.00 (L) 4.23 - 5.67 M/uL    HGB 12.0 (L) 13.6 - 17.2 g/dL    HCT 38.1 (L) 41.1 - 50.3 %    MCV 95.3 79.6 - 97.8 FL    MCH 30.0 26.1 - 32.9 PG    MCHC 31.5 31.4 - 35.0 g/dL    RDW 14.3 11.9 - 14.6 %    PLATELET 604 (L) 972 - 450 K/uL    MPV 9.8 (L) 10.8 - 59.8 FL   METABOLIC PANEL, BASIC    Collection Time: 07/11/17  2:30 PM   Result Value Ref Range    Sodium 140 136 - 145 mmol/L    Potassium 3.8 3.5 - 5.1 mmol/L    Chloride 97 (L) 98 - 107 mmol/L    CO2 36 (H) 21 - 32 mmol/L    Anion gap 7 7 - 16 mmol/L    Glucose 102 (H) 65 - 100 mg/dL    BUN 15 8 - 23 MG/DL    Creatinine 1.91 (H) 0.8 - 1.5 MG/DL    GFR est AA 44 (L) >60 ml/min/1.73m2    GFR est non-AA 36 (L) >60 ml/min/1.73m2    Calcium 8.5 8.3 - 10.4 MG/DL

## 2017-07-11 NOTE — PERIOP NOTES
Patient verified name, , and surgery as listed in [facility]. TYPE  CASE:ll  Orders per surgeon: were Received  Labs per surgeon:cbc, bmp and urinalysis and urine culture   Labs per anesthesia protocol: T&S per verbal order from Dr Juve Salguero   EKG  :  Ekg and echo with low EF and pacer/ICD check and last cardiac note and old records all reviewed by Dr Juve Salguero. No new orders obtained accept he wanted patient to start back on asa 81 mg today and take daily to include am of surgery      Patient provided with handouts including guide to surgery , transfusions, pain management and hand hygiene for the family and community. Pt verbalizes understanding of all pre-op instructions . Instructed that family must be present in building at all times. Nothing to eat or drink after midnight the night prior to surgery. 2017 and instructions given per hospital policy. Instructed patient to continue  previous medications as prescribed prior to surgery and  to take the following medications the day of surgery according to anesthesia guidelines : albuterol, albuterol nebulizer, amiodarone, asa 81 mg, symbicort, metoprolol, omeprazole       Original medication prescription bottles were visualized during patient appointment. Continue all previous medications unless otherwise directed. Instructed patient to hold  the following medications prior to surgery: eliquis since 2017 per Dr Cami Lee office      Patient verbalized understanding of all instructions and provided all medical/health information to the best of their ability.

## 2017-07-11 NOTE — ANESTHESIA PREPROCEDURE EVALUATION
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 dependent), CAD and CABG    Exercise tolerance: <4 METS  Comments: EF 25-30%   GI/Hepatic/Renal     GERD: well controlled           Endo/Other        Arthritis     Other Findings              Physical Exam    Airway  Mallampati: III      Mouth opening: Diminished (comment)     Cardiovascular    Rhythm: regular  Rate: normal         Dental    Dentition: Full lower dentures and Full upper dentures     Pulmonary      Decreased breath sounds: bilateral      Prolonged expiration     Abdominal         Other Findings            Anesthetic Plan    ASA: 4  Anesthesia type: spinal            Anesthetic plan and risks discussed with: Patient and Son / Daughter      Low EF with ICD.  Also significant COPD and poor pulmonary function- discussed SAB for case- has been off Eliquis but remains on low dose aspirin

## 2017-07-14 PROBLEM — N40.0 BPH (BENIGN PROSTATIC HYPERPLASIA): Status: ACTIVE | Noted: 2017-01-01

## 2017-07-14 NOTE — IP AVS SNAPSHOT
[address] 
[phone] Patient: Javid Akers MRN: [id number] You are allergic to the following No active allergies Recent Documentation Height Weight BMI Smoking Status 1.702 m 68.6 kg 23.68 kg/m2 Former Smoker Emergency Contacts Name Discharge Info Relation Home Work Mobile Lucy Mayes CAREGIVER [3] Daughter [21] [phone] About your hospitalization You were admitted on:  2017 You last received care in the:  [facility] 6 MED SURG You were discharged on:  2017 Unit phone number:  [phone] Why you were hospitalized Your primary diagnosis was:  Not on File Your diagnoses also included:  Bph (Benign Prostatic Hyperplasia), Bph With Obstruction/Lower Urinary Tract Symptoms Providers Seen During Your Hospitalizations Provider Role Specialty Primary office phone Azar Pemberton MD Attending Provider Urology [phone] Your Primary Care Physician (PCP) Primary Care Physician Office Phone Office Fax [facility] [phone] Follow-up Information Follow up With Details Comments Contact Info María Wang MD   [address] [place] [address] Austen Simon 
[phone] Azar Pemberton MD  office will call you with follow up appt [address] [address] 
[phone] Your Appointments 2017 11:40 AM EDT  
REMOTE DEVICE CHECK ORDERS ONLY ENCOUNTER with JR HENDERSON 62 [facility] ([address] [place] [address]) 2 Ajith Reyn[address]   
[phone] Please remember THIS REMOTE CHECK IS COMPLETED FROM HOME - YOU WILL NOT COME TO THE OFFICE FOR THIS APPOINTMENT.  In preparation for this check, please ensure your monitor box is working appropriately. If your monitor requires you to send a transmission, please make sure it is sent by 11:00AM on this day so we can have it processed and resulted to your doctor without delay. If you have a question or problem with the monitor box, please contact your respective company:   [address] [place] [address]/[employer] - [phone]  [employer] - [phone]  [employer] - [phone]  [employer]/[facility] - [phone]  If you have any further questions or need to move this appointment, we are happy to help and can be reached at [phone]. Tuesday August 08, 2017  1:50 PM EDT Office Visit with Scarlett Snyder MD  
[facility] Urology 48 ([facility] [address]) [address]  
[phone] Friday August 18, 2017  3:15 PM EDT Office Visit with Cheyanne López MD  
[facility] ([facility]) [address] 
[address] [facility]  
[phone] Current Discharge Medication List  
  
START taking these medications Dose & Instructions Dispensing Information Comments Morning Noon Evening Bedtime HYDROcodone-acetaminophen 5-325 mg per tablet Commonly known as:  Fredrica Flanagan Your next dose is:  As needed Dose:  1 Tab Take 1 Tab by mouth every six (6) hours as needed. Max Daily Amount: 4 Tabs. Quantity:  20 Tab Refills:  0  
     
   
   
   
  
 nitrofurantoin 100 mg capsule Commonly known as:  MACRODANTIN Your next dose is: Today with supper Dose:  100 mg Take 1 Cap by mouth two (2) times a day. Quantity:  6 Cap Refills:  0 CONTINUE these medications which have CHANGED Dose & Instructions Dispensing Information Comments Morning Noon Evening Bedtime  
 furosemide 80 mg tablet Commonly known as:  LASIX What changed:  when to take this Your next dose is: Today with supper Dose:  80 mg Take 1 Tab by mouth two (2) times a day. Quantity:  180 Tab Refills:  3  
     
   
   
  
   
  
 lisinopril 40 mg tablet Commonly known as:  Inderjit Delgado What changed:   
- how much to take - when to take this 
- additional instructions Your next dose is:  7/18 TAKE ONE TABLET BY MOUTH ONCE DAILY Quantity:  90 Tab Refills:  3  
     
   
   
   
  
 montelukast 10 mg tablet Commonly known as:  TAMIULAIR What changed:   
- how much to take - when to take this 
- additional instructions Your next dose is: Tonight atbedtime 1 po q hs Quantity:  30 Tab Refills:  11  
     
   
   
   
  
  
 tamsulosin 0.4 mg capsule Commonly known as:  FLOMAX What changed:  when to take this Your next dose is:  7/18 Dose:  0.4 mg Take 1 Cap by mouth daily. Quantity:  60 Cap Refills:  11 CONTINUE these medications which have NOT CHANGED Dose & Instructions Dispensing Information Comments Morning Noon Evening Bedtime * albuterol 90 mcg/actuation inhaler Commonly known as:  PROAIR HFA Your next dose is:  As needed 2 puffs qid prn Quantity:  1 Inhaler Refills:  11  
     
   
   
   
  
 * albuterol 2.5 mg /3 mL (0.083 %) nebulizer solution Commonly known as:  PROVENTIL VENTOLIN Your next dose is:  As needed 1 vial via nebulizer qid and prn;  Bill to medicare part B, dx COPD - J44.9  Indications: Chronic Obstructive Pulmonary Disease Quantity:  120 Each Refills:  11 Dispense #120 vials  
    
   
   
   
  
 amiodarone 200 mg tablet Commonly known as:  CORDARONE Your next dose is: Tonight at bedtime Dose:  200 mg Take 1 Tab by mouth every twelve (12) hours. Quantity:  60 Tab Refills:  5  
     
   
   
   
  
  
 ascorbic acid (vitamin C) 500 mg tablet Commonly known as:  VITAMIN C  
 Your next dose is:  7/18 Take  by mouth. Refills:  0  
     
   
   
   
  
 BABY ASPIRIN 81 mg chewable tablet Generic drug:  aspirin Your next dose is:  7/18 Dose:  81 mg Take 81 mg by mouth daily. Refills:  0  
     
   
   
   
  
 BENADRYL 25 mg capsule Generic drug:  diphenhydrAMINE Your next dose is: Tonight at bedtime Dose:  25 mg Take 25 mg by mouth nightly. Refills:  0  
     
   
   
   
  
  
 budesonide-formoterol 160-4.5 mcg/actuation HFA inhaler Commonly known as:  SYMBICORT Your next dose is: At supper today 2 puffs bid, rinse mouth after use Quantity:  1 Inhaler Refills:  11  
     
   
   
  
   
  
 melatonin 5 mg Cap capsule Your next dose is: Tonight at bedtime Dose:  5 mg Take 5 mg by mouth nightly. Refills:  0  
     
   
   
   
  
  
 metOLazone 5 mg tablet Commonly known as:  Sihvam Lapidus Your next dose is:  As needed Take  by mouth as needed. Refills:  0  
     
   
   
   
  
 metoprolol succinate 50 mg XL tablet Commonly known as:  TOPROL-XL Your next dose is: Tonight at bedtime Dose:  50 mg Take 1 Tab by mouth every twelve (12) hours. Quantity:  180 Tab Refills:  3 OCUVITE PO Your next dose is:  7/18 Take  by mouth. Holding for surgery Refills:  0  
     
   
   
   
  
 omeprazole 20 mg capsule Commonly known as:  PRILOSEC Your next dose is: Tonight at bedtime Dose:  20 mg Take 20 mg by mouth nightly. Refills:  0 OXYGEN-AIR DELIVERY SYSTEMS Dose:  3 L  
3 L by Nasal route nightly. Refills:  0  
     
   
   
   
  
 potassium chloride 20 mEq packet Commonly known as:  KLOR-CON Your next dose is:  7/18 Dose:  20 mEq Take 20 mEq by mouth daily. Refills:  0 RisperDAL 4 mg tablet Generic drug:  risperiDONE Your next dose is: Tonight at bedtime Dose:  4 mg Take 4 mg by mouth nightly. Refills:  0  
     
   
   
   
  
  
 simvastatin 20 mg tablet Commonly known as:  ZOCOR Your next dose is: Tonight at bedtime Dose:  20 mg Take 1 Tab by mouth nightly. Quantity:  90 Tab Refills:  3 VITAMIN D3 1,000 unit Cap Generic drug:  cholecalciferol Your next dose is:  7/18 Take  by mouth. Refills:  0  
     
   
   
   
  
 vitamin E acetate 400 unit Cap capsule Your next dose is:  7/18 Take  by mouth daily. Holding for surgery Refills:  0 ZINC ACETATE PO Your next dose is:  7/18 Take  by mouth. Holding for surgery Refills:  0  
     
   
   
   
  
 * Notice: This list has 2 medication(s) that are the same as other medications prescribed for you. Read the directions carefully, and ask your doctor or other care provider to review them with you. STOP taking these medications   
 apixaban 2.5 mg tablet Commonly known as:  Obadiah Severe Notes to Patient:  Silvio Blaise ON Thursday!! Where to Get Your Medications Information on where to get these meds will be given to you by the nurse or doctor. ! Ask your nurse or doctor about these medications HYDROcodone-acetaminophen 5-325 mg per tablet  
 nitrofurantoin 100 mg capsule Discharge Instructions DISCHARGE SUMMARY from Nurse The following personal items are in your possession at time of discharge: 
 
Dental Appliances: Uppers, Lowers Visual Aid: Glasses, With patient Home Medications: None Jewelry: None Clothing: Shirt, Pants, Undergarments, Footwear Other Valuables: Sascha Oris PATIENT INSTRUCTIONS: 
 
 
F-face looks uneven A-arms unable to move or move unevenly S-speech slurred or non-existent T-time-call 911 as soon as signs and symptoms begin-DO NOT go Back to bed or wait to see if you get better-TIME IS BRAIN. Warning Signs of HEART ATTACK Call 911 if you have these symptoms: 
? Chest discomfort. Most heart attacks involve discomfort in the center of the chest that lasts more than a few minutes, or that goes away and comes back. It can feel like uncomfortable pressure, squeezing, fullness, or pain. ? Discomfort in other areas of the upper body. Symptoms can include pain or discomfort in one or both arms, the back, neck, jaw, or stomach. ? Shortness of breath with or without chest discomfort. ? Other signs may include breaking out in a cold sweat, nausea, or lightheadedness. Don't wait more than five minutes to call [address]! Fast action can save your life. Calling 911 is almost always the fastest way to get lifesaving treatment. Emergency Medical Services staff can begin treatment when they arrive  up to an hour sooner than if someone gets to the hospital by car. The discharge information has been reviewed with the patient. The patient verbalized understanding. Discharge medications reviewed with the patient and appropriate educational materials and side effects teaching were provided. Transurethral Resection of the Prostate (TURP): What to Expect at [facility] Your Recovery Transurethral resection of the prostate (TURP) is surgery to remove prostate tissue. It is done when an overgrown prostate gland is pressing on the urethra and making it hard for a man to urinate. You may need a urinary catheter for a short time.  It is a flexible plastic tube used to drain urine from your bladder when you can't urinate on your own. If it is still in place when you go home, your doctor will give you instructions on how to care for your catheter. For several days after surgery, you may feel burning when you urinate. Your urine may be pink for 1 to 3 weeks after surgery. You also may have bladder cramps, or spasms. Your doctor may give you medicine to help control the spasms. You may still feel like you need to urinate often in the weeks after your surgery. It often takes up to 6 weeks for this to get better. After you have healed, you may have less trouble urinating. You may have better control over starting and stopping your urine stream. And you may feel like you get more relief when you urinate. Most men can return to work or many of their usual tasks in 1 to 3 weeks. But for about 6 weeks, try to avoid heavy lifting and strenuous activities that might put extra pressure on your bladder. Most men still can have erections after surgery (if they were able to have them before surgery). But they may not ejaculate when they have an orgasm. Semen may go into the bladder instead of out through the penis. This is called retrograde ejaculation. It does not hurt and is not harmful to your health. This care sheet gives you a general idea about how long it will take for you to recover. But each person recovers at a different pace. Follow the steps below to get better as quickly as possible. How can you care for yourself at home? Activity · Rest when you feel tired. · Be active. Walking is a good choice. · Allow your body to heal. Don't move quickly or lift anything heavy until you are feeling better. · Ask your doctor when you can drive again. · Many people are able to return to work within 1 to 3 weeks after surgery. It depends on the type of work you do and how you feel. · Do not put anything in your rectum, such as an enema or suppository, for 4 to 6 weeks after the surgery. · You may shower and take baths when your doctor says it is okay. · Ask your doctor when it is okay for you to have sex. Diet · You can eat your normal diet. If your stomach is upset, try bland, low-fat foods like plain rice, broiled chicken, toast, and yogurt. · If your bowel movements are not regular right after surgery, try to avoid constipation and straining. Drink plenty of water. Your doctor may suggest fiber, a stool softener, or a mild laxative. Medicines · Your doctor will tell you if and when you can restart your medicines. He or she will also give you instructions about taking any new medicines. · If you take aspirin or some other blood thinner, be sure to talk to your doctor. He or she will tell you if and when to start taking those medicines again. Make sure that you understand exactly what your doctor wants you to do. · Be safe with medicines. Read and follow all instructions on the label. ¨ If the doctor gave you a prescription medicine for pain, take it as prescribed. ¨ If you are not taking a prescription pain medicine, ask your doctor if you can take an over-the-counter medicine. · Take your antibiotics as directed. Do not stop taking them just because you feel better. You need to take the full course of antibiotics. Follow-up care is a key part of your treatment and safety. Be sure to make and go to all appointments, and call your doctor if you are having problems. It's also a good idea to know your test results and keep a list of the medicines you take. When should you call for help? Call 911 anytime you think you may need emergency care. For example, call if: 
· You passed out (lost consciousness). · You have symptoms of a blood clot in your lung (called a pulmonary embolism). These may include: 
¨ Sudden chest pain. ¨ Trouble breathing. ¨ Coughing up blood. Call your doctor now or seek immediate medical care if: 
· You have symptoms of infection, such as: ¨ Increased pain, swelling, warmth, or redness around the cut. ¨ Red streaks leading from the cut. ¨ Pus draining from the cut. ¨ A fever. · You have new or more blood or blood clots in your urine. · You have pain in the flank, which is just below the rib cage and above the waist on either side of the back. · You have new or worse pain or burning when you urinate. · You cannot urinate or have trouble urinating. · You have a new or worse problem with controlling your bladder. · You have signs of a blood clot, such as: 
¨ Pain in your calf, back of the knee, thigh, or groin. ¨ Redness and swelling in your leg or groin. Watch closely for changes in your health, and be sure to contact your doctor if: 
· You are not getting better as expected. · You do not have a bowel movement after taking a laxative. Where can you learn more? Go to http://maggie-martine.info/. Enter S797 in the search box to learn more about \"Transurethral Resection of the Prostate (TURP): What to Expect at Home. \" Current as of: March 14, 2017 Content Version: 11.3 © 0320-7070 [employer]. Care instructions adapted under license by [employer] (which disclaims liability or warranty for this information). If you have questions about a medical condition or this instruction, always ask your healthcare professional. [place] [address] any warranty or liability for your use of this information. Discharge Orders None Introducing [facility]! Dear Jaime Waller: Thank you for requesting a [employer] account. Our records indicate that you already have an active [employer] account. You can access your account anytime at [email or web address] Did you know that you can access your hospital and ER discharge instructions at any time in [employer]? You can also review all of your test results from your hospital stay or ER visit. Additional Information If you have questions, please visit the Frequently Asked Questions section of the [employer] website at [email or web address] Remember, MyChart is NOT to be used for urgent needs. For medical emergencies, dial 911. Now available from your iPhone and Android! General Information Please provide this summary of care documentation to your next provider. Patient Signature:  ____________________________________________________________ Date:  ____________________________________________________________  
  
Fayrene Retort Provider Signature:  ____________________________________________________________ Date:  ____________________________________________________________

## 2017-07-14 NOTE — ANESTHESIA PROCEDURE NOTES
Spinal Block    Start time: 7/14/2017 3:53 PM  End time: 7/14/2017 3:57 PM  Performed by: Ryan More  Authorized by: Ryan More     Pre-procedure:   Indications: primary anesthetic  Preanesthetic Checklist: patient identified, risks and benefits discussed, anesthesia consent, site marked, patient being monitored and timeout performed    Timeout Time: 15:53          Spinal Block:   Patient Position:  Seated  Prep Region:  Lumbar  Prep: chlorhexidine      Location:  L3-4  Technique:  Single shot  Local:  Lidocaine 1% (1 mL)  Local Dose (mL):  1    Needle:   Needle Type:  Pencan  Needle Gauge:  25 G  Attempts:  1      Events: CSF confirmed, no blood with aspiration and no paresthesia        Assessment:  Insertion:  Uncomplicated  Patient tolerance:  Patient tolerated the procedure well with no immediate complications  Anesthetic medications:  Marcaine: 12 mg in 8.25% Dextrose

## 2017-07-14 NOTE — ANESTHESIA POSTPROCEDURE EVALUATION
Post-Anesthesia Evaluation and Assessment    Patient: Travis Patricia MRN: [id number]  SSN: [id number]    YOB: 1935  Age: 80 y.o. Sex: male       Cardiovascular Function/Vital Signs  Visit Vitals    /58 (BP 1 Location: Right arm, BP Patient Position: At rest)    Pulse 63    Temp 36.6 °C (97.8 °F)    Resp 16    Ht 5' 7\" (1.702 m)    Wt 68.7 kg (151 lb 9 oz)    SpO2 100%    BMI 23.74 kg/m2       Patient is status post spinal anesthesia for Procedure(s):  TRANSURETHRAL RESECTION OF PROSTATE WITH BIPOLAR. Nausea/Vomiting: None    Postoperative hydration reviewed and adequate. Pain:  Pain Scale 1: Numeric (0 - 10) (07/14/17 1724)  Pain Intensity 1: 0 (07/14/17 1724)   Managed    Neurological Status:   Neuro (WDL): Exceptions to WDL (07/14/17 1724)  Neuro  LUE Motor Response: Purposeful (07/14/17 1724)  LLE Motor Response: Numbness (07/14/17 1724)  RUE Motor Response: Purposeful (07/14/17 1724)  RLE Motor Response: Numbness (07/14/17 1724)   At baseline    Mental Status and Level of Consciousness: Arousable    Pulmonary Status:   O2 Device: Oxygen mask (07/14/17 1724)   Adequate oxygenation and airway patent    Complications related to anesthesia: None    Post-anesthesia assessment completed.  No concerns    Signed By: Tana Paul MD     July 14, 2017

## 2017-07-14 NOTE — BRIEF OP NOTE
BRIEF OPERATIVE NOTE    Date of Procedure: 7/14/2017   Preoperative Diagnosis: Benign nodular prostatic hyperplasia, presence of lower urinary tract symptoms unspecified [N40.0]  Postoperative Diagnosis: Benign nodular prostatic hyperplasia, presence of lower urinary tract symptoms unspecified [N40.0]    Procedure(s):  TRANSURETHRAL RESECTION OF PROSTATE WITH BIPOLAR  Surgeon(s) and Role:     * Jonnathan Mercer MD - Primary         Assistant Staff:       Surgical Staff:  Circ-1: Juno Redd RN  Circ-2: Jane Santana RN  Event Time In   Incision Start 1612   Incision Close 1637     Anesthesia: General   Estimated Blood Loss: <3ml  Specimens: * No specimens in log *   Findings: small gland--good channel created  Complications: 0  Implants: * No implants in log *    Op note- I have discussed the medical therapy for  outlet obstruction from the prostate. I also discussed with the patient surgical alternatives such as a TURP, bipolar vaporization of the prostate, green light laser vaporization of the prostate. Risks not exclusive of death, infection, bleeding, incontinence, erectile dysfunction, bladder neck contracture, urethral stricture, retrograde ejaculation,and the possible need for additional procedures were all discussed with the patient. Patient states he understands and wishes to proceed with planned surgical intervention.   [id number]

## 2017-07-14 NOTE — PERIOP NOTES
TRANSFER - OUT REPORT:    Verbal report given to Aaron Salvador (name) on [facility]  being transferred to [facility](unit) for routine post - op       Report consisted of patients Situation, Background, Assessment and   Recommendations(SBAR). Information from the following report(s) OR Summary was reveiwed with the receiving nurse. Opportunity for questions and clarification was provided. VTE prophylaxis orders have  been written for this patient.

## 2017-07-15 NOTE — PROGRESS NOTES
TRANSFER - IN REPORT:    Verbal report received from [place], RN on Ky Moran  being received from PACU for routine progression of care      Report consisted of patients Situation, Background, Assessment and   Recommendations(SBAR). Information from the following report(s) SBAR was reviewed with the receiving nurse. Opportunity for questions and clarification was provided. Assessment completed upon patients arrival to unit and care assumed.

## 2017-07-15 NOTE — PROGRESS NOTES
Rounded on pt hourly throughout shift. Needs met at this time. Will continue to monitor and report to day shift RN.

## 2017-07-15 NOTE — PROGRESS NOTES
Primary Nurse [place] and Laney Solares RN performed a dual skin assessment on this patient. Area of blanchable redness noted on sacrum. Allevan applied. Zimmerman draining clear, bloody urine. Denies any pain at this time. Pt oriented to room and call light. Denies any further needs at this time.

## 2017-07-15 NOTE — PROGRESS NOTES
16F coude cath inserted using sterile technique. Pt tolerated well. Serosanguinous urine noted with some clots.

## 2017-07-15 NOTE — PROGRESS NOTES
Pt asking RN to find his bag of clothes he says was with him when he was in PACU. Attempted to call PACU but no answer. Will try again later and report to dayshift RN.

## 2017-07-15 NOTE — PROGRESS NOTES
END OF SHIFT NOTE:    INTAKE/OUTPUT  07/14 0701 - 07/15 0700  In: 36388 [P.O.:100; I.V.:1100]  Out: 17818 [Urine:50466]  Voiding: NO  Catheter: YES  Color: bloody  Drain:              DIET  regular    Flatus: Patient does not have flatus present. Stool:  0 occurrences. Characteristics:       Ambulating  No    Emesis: 0 occurrences.     Characteristics:          VITAL SIGNS  Patient Vitals for the past 12 hrs:   Temp Pulse Resp BP SpO2   07/15/17 0334 97.4 °F (36.3 °C) 74 18 135/73 97 %   07/15/17 0220 - - - - 92 %   07/14/17 2327 97.3 °F (36.3 °C) 63 18 144/64 90 %   07/14/17 2018 - - - - 93 %   07/14/17 1957 97.4 °F (36.3 °C) 60 16 118/52 94 %   07/14/17 1920 - 60 16 139/67 96 %   07/14/17 1901 - 62 16 119/60 94 %   07/14/17 1900 - - 16 - -   07/14/17 1831 - 62 16 126/67 97 %   07/14/17 1816 - 61 16 127/60 97 %   07/14/17 1801 - 61 16 129/62 96 %       Pain Assessment  Pain Intensity 1: 6 (07/15/17 0334)  Pain Location 1: Groin  Pain Intervention(s) 1: Medication (see MAR)  Patient Stated Pain Goal: 0            Isaak Kunz

## 2017-07-15 NOTE — PROGRESS NOTES
Admit Date: 7/14/2017    Subjective:     Patient has no new complaint. Objective:     Patient Vitals for the past 8 hrs:   BP Temp Pulse Resp SpO2   07/15/17 0807 138/62 97.3 °F (36.3 °C) 69 18 90 %   07/15/17 0334 135/73 97.4 °F (36.3 °C) 74 18 97 %   07/15/17 0220 - - - - 92 %        07/13 1901 - 07/15 0700  In: 86126 [P.O.:100; I.V.:1100]  Out: 24300 [Urine:00539]    Physical Exam: abd soft, urine lightest pink/peach on slow cbi. Data Review   Recent Results (from the past 24 hour(s))   TYPE & SCREEN    Collection Time: 07/14/17  2:00 PM   Result Value Ref Range    Crossmatch Expiration 07/17/2017     ABO/Rh(D) Evelyn Frazier POSITIVE     Antibody screen NEG            Assessment:     Active Problems:    BPH (benign prostatic hyperplasia) (7/14/2017)    POD 1 s/p plasmabutton vaporization of the prostate    Plan: Zimmerman removed this AM.    Home later today when voiding. No eliquis until Wednesday.     Lashay Santacruz MD

## 2017-07-16 PROBLEM — N13.8 BPH WITH OBSTRUCTION/LOWER URINARY TRACT SYMPTOMS: Status: ACTIVE | Noted: 2017-01-01

## 2017-07-16 PROBLEM — N40.1 BPH WITH OBSTRUCTION/LOWER URINARY TRACT SYMPTOMS: Status: ACTIVE | Noted: 2017-01-01

## 2017-07-16 NOTE — PROGRESS NOTES
Admit Date: 7/14/2017    Subjective:     Patient has no new complaint. He is currently alert and oriented x 3. Objective:     Patient Vitals for the past 8 hrs:   BP Temp Pulse Resp SpO2 Weight   07/16/17 0816 - - - - 93 % -   07/16/17 0713 135/57 97.2 °F (36.2 °C) 72 20 91 % -   07/16/17 0643 - - - - - 150 lb 3.2 oz (68.1 kg)   07/16/17 0342 147/62 97.3 °F (36.3 °C) 77 20 94 % -        07/14 1901 - 07/16 0700  In: 9000   Out: 00922 [Urine:30990]    Physical Exam: rrr, ctab, abd soft, urine pink/light red with cantu in place. Data Review No results found for this or any previous visit (from the past 24 hour(s)). Assessment:     Active Problems:    BPH (benign prostatic hyperplasia) (7/14/2017)    POD 2 s/p bipolar plasmabutton vaporization of the prostate    Plan:       Urine is still pink/red via cantu. It may require hand irrigation and therefore patient will need to remain in the hospital at least until tomorrow. CHANGE ADMIT STATUS TO INPATIENT. Mental status this AM is baseline I suspect. He is A&Ox3.      Africa Suero MD

## 2017-07-16 NOTE — PROGRESS NOTES
Patient confused throughout the night, but easily reoriented. Patient confused about waking up away from home. Zimmerman draining pink urine. No BM, patient is passing gas and has active bowel sounds. No complaints of pain throughout the night.

## 2017-07-17 NOTE — PROGRESS NOTES
Problem: Gas Exchange - Impaired  Goal: *Absence of hypoxia  Outcome: Progressing Towards Goal  95%on 2.5L NC  -states he wears 2.5-3L at home. Did not wean.   Diminished BBS

## 2017-07-17 NOTE — PROGRESS NOTES
Discharge instructions and prescriptions given and reviewed with pt, verbalizes understanding, medication side effect sheet reviewed with pt, pt to ne discharged home when voiding well.

## 2017-07-17 NOTE — PROGRESS NOTES
Dr. Srinivas Zimmer says ok to dc to home with daughter. She reassures us patient is not left alone.  Foely had to be replaced and patient has a follow up appointment on Monday July 24 at 10:00 am.  LAZARO Izaguirre

## 2017-07-17 NOTE — PROGRESS NOTES
[facility]  Face to Face Encounter    Patients Name: Romain Arreola    YOB: 1935    Ordering Physician: Gabbie Bah    Primary Diagnosis: Benign nodular prostatic hyperplasia, presence of lower urinary tract symptoms unspecified [N40.0]    Date of Face to Face:   7/17/2017                                  Face to Face Encounter findings are related to primary reason for home care:   yes. 1. I certify that the patient needs intermittent care as follows: skilled nursing care:  skilled observation/assessment, patient education  physical therapy: strengthening    2. I certify that this patient is homebound, that is: 1) patient requires the use of a walker device, special transportation, or assistance of another to leave the home; or 2) patient's condition makes leaving the home medically contraindicated; and 3) patient has a normal inability to leave the home and leaving the home requires considerable and taxing effort. Patient may leave the home for infrequent and short duration for medical reasons, and occasional absences for non-medical reasons. Homebound status is due to the following functional limitations: Patient with strength deficits limiting the performance of all ADL's without caregiver assistance or the use of an assistive device. 3. I certify that this patient is under my care and that I, or a nurse practitioner or [facility], or clinical nurse specialist, or certified nurse midwife, working with me, had a Face-to-Face Encounter that meets the physician Face-to-Face Encounter requirements.   The following are the clinical findings from the [address] [place] [address] encounter that support the need for skilled services and is a summary of the encounter:     See Hospital chart      [facility]  7/17/2017      THE FOLLOWING TO BE COMPLETED BY THE COMMUNITY PHYSICIAN:    I concur with the findings described above from the [facility] encounter that this patient is homebound and in need of a skilled service.     Certifying Physician: _____________________________________      Printed Certifying Physician Name: _____________________________________    Date: _________________

## 2017-07-17 NOTE — PROGRESS NOTES
Care Management Interventions  PCP Verified by CM: Yes  Transition of Care Consult (CM Consult): [address]: No  Reason Outside [place]: Unable to staff case  Physical Therapy Consult: Yes  Current Support Network: Relative's Home (daughter and son in law)  Confirm Follow Up Transport: Family  Plan discussed with Pt/Family/Caregiver: Yes  Freedom of Choice Offered: Yes  Discharge Location  Discharge Placement: Home with home health     Met with patient at the bedside. He is s/p a TURP and is scheduled for dc today. Patient expressing concern about going home. Feels weak. Will be alone during the day. Lives with daughter and son in law. Has o2 at home for nocturnal use and uses [facility]. He is receptive to [facility] services and feels that support will help him back to baseline. Has to home his bloos thinner. Needs to void before he can go. Referred to [facility] Margaretha Runner Bettejane Distel

## 2017-07-17 NOTE — PROGRESS NOTES
Patient rounded on throughout the night, all needs met. No complaints of pain, and very little confusion, much improved from the previous night. Zimmerman draining pink urine. No BM on this shift.

## 2017-07-17 NOTE — DISCHARGE INSTRUCTIONS
DISCHARGE SUMMARY from Nurse    The following personal items are in your possession at time of discharge:    Dental Appliances: Uppers, Lowers  Visual Aid: Glasses, With patient     Home Medications: None  Jewelry: None  Clothing: Shirt, Pants, Undergarments, Footwear  Other Valuables: Cane             PATIENT INSTRUCTIONS:    After general anesthesia or intravenous sedation, for 24 hours or while taking prescription Narcotics:  · Limit your activities  · Do not drive and operate hazardous machinery  · Do not make important personal or business decisions  · Do  not drink alcoholic beverages  · If you have not urinated within 8 hours after discharge, please contact your surgeon on call. Report the following to your surgeon:  · Excessive pain, swelling, redness or odor of or around the surgical area  · Temperature over 100.5  · Nausea and vomiting lasting longer than 4 hours or if unable to take medications  · Any signs of decreased circulation or nerve impairment to extremity: change in color, persistent  numbness, tingling, coldness or increase pain  · Any questions        What to do at Home:  Recommended activity: No lifting, Driving, or Strenuous exercise for 2 weeks. If you experience any of the following symptoms difficulty urinating, fever greater than 101, nausea/vomiting, or pain, please follow up with Dr. Cristian Vang. *  Please give a list of your current medications to your Primary Care Provider. *  Please update this list whenever your medications are discontinued, doses are      changed, or new medications (including over-the-counter products) are added. *  Please carry medication information at all times in case of emergency situations. These are general instructions for a healthy lifestyle:    No smoking/ No tobacco products/ Avoid exposure to second hand smoke    Surgeon General's Warning:  Quitting smoking now greatly reduces serious risk to your health.     Obesity, smoking, and sedentary lifestyle greatly increases your risk for illness    A healthy diet, regular physical exercise & weight monitoring are important for maintaining a healthy lifestyle    You may be retaining fluid if you have a history of heart failure or if you experience any of the following symptoms:  Weight gain of 3 pounds or more overnight or 5 pounds in a week, increased swelling in our hands or feet or shortness of breath while lying flat in bed. Please call your doctor as soon as you notice any of these symptoms; do not wait until your next office visit. Recognize signs and symptoms of STROKE:    F-face looks uneven    A-arms unable to move or move unevenly    S-speech slurred or non-existent    T-time-call 911 as soon as signs and symptoms begin-DO NOT go       Back to bed or wait to see if you get better-TIME IS BRAIN. Warning Signs of HEART ATTACK     Call 911 if you have these symptoms:   Chest discomfort. Most heart attacks involve discomfort in the center of the chest that lasts more than a few minutes, or that goes away and comes back. It can feel like uncomfortable pressure, squeezing, fullness, or pain.  Discomfort in other areas of the upper body. Symptoms can include pain or discomfort in one or both arms, the back, neck, jaw, or stomach.  Shortness of breath with or without chest discomfort.  Other signs may include breaking out in a cold sweat, nausea, or lightheadedness. Don't wait more than five minutes to call 911 - MINUTES MATTER! Fast action can save your life. Calling 911 is almost always the fastest way to get lifesaving treatment. Emergency Medical Services staff can begin treatment when they arrive -- up to an hour sooner than if someone gets to the hospital by car. The discharge information has been reviewed with the patient. The patient verbalized understanding.     Discharge medications reviewed with the patient and appropriate educational materials and side effects teaching were provided. Transurethral Resection of the Prostate (TURP): What to Expect at [facility]    Transurethral resection of the prostate (TURP) is surgery to remove prostate tissue. It is done when an overgrown prostate gland is pressing on the urethra and making it hard for a man to urinate. You may need a urinary catheter for a short time. It is a flexible plastic tube used to drain urine from your bladder when you can't urinate on your own. If it is still in place when you go home, your doctor will give you instructions on how to care for your catheter. For several days after surgery, you may feel burning when you urinate. Your urine may be pink for 1 to 3 weeks after surgery. You also may have bladder cramps, or spasms. Your doctor may give you medicine to help control the spasms. You may still feel like you need to urinate often in the weeks after your surgery. It often takes up to 6 weeks for this to get better. After you have healed, you may have less trouble urinating. You may have better control over starting and stopping your urine stream. And you may feel like you get more relief when you urinate. Most men can return to work or many of their usual tasks in 1 to 3 weeks. But for about 6 weeks, try to avoid heavy lifting and strenuous activities that might put extra pressure on your bladder. Most men still can have erections after surgery (if they were able to have them before surgery). But they may not ejaculate when they have an orgasm. Semen may go into the bladder instead of out through the penis. This is called retrograde ejaculation. It does not hurt and is not harmful to your health. This care sheet gives you a general idea about how long it will take for you to recover. But each person recovers at a different pace. Follow the steps below to get better as quickly as possible. How can you care for yourself at home? Activity  · Rest when you feel tired. · Be active. Walking is a good choice. · Allow your body to heal. Don't move quickly or lift anything heavy until you are feeling better. · Ask your doctor when you can drive again. · Many people are able to return to work within 1 to 3 weeks after surgery. It depends on the type of work you do and how you feel. · Do not put anything in your rectum, such as an enema or suppository, for 4 to 6 weeks after the surgery. · You may shower and take baths when your doctor says it is okay. · Ask your doctor when it is okay for you to have sex. Diet  · You can eat your normal diet. If your stomach is upset, try bland, low-fat foods like plain rice, broiled chicken, toast, and yogurt. · If your bowel movements are not regular right after surgery, try to avoid constipation and straining. Drink plenty of water. Your doctor may suggest fiber, a stool softener, or a mild laxative. Medicines  · Your doctor will tell you if and when you can restart your medicines. He or she will also give you instructions about taking any new medicines. · If you take aspirin or some other blood thinner, be sure to talk to your doctor. He or she will tell you if and when to start taking those medicines again. Make sure that you understand exactly what your doctor wants you to do. · Be safe with medicines. Read and follow all instructions on the label. ¨ If the doctor gave you a prescription medicine for pain, take it as prescribed. ¨ If you are not taking a prescription pain medicine, ask your doctor if you can take an over-the-counter medicine. · Take your antibiotics as directed. Do not stop taking them just because you feel better. You need to take the full course of antibiotics. Follow-up care is a key part of your treatment and safety. Be sure to make and go to all appointments, and call your doctor if you are having problems. It's also a good idea to know your test results and keep a list of the medicines you take.   When should you call for help?  Call 911 anytime you think you may need emergency care. For example, call if:  · You passed out (lost consciousness). · You have symptoms of a blood clot in your lung (called a pulmonary embolism). These may include:  ¨ Sudden chest pain. ¨ Trouble breathing. ¨ Coughing up blood. Call your doctor now or seek immediate medical care if:  · You have symptoms of infection, such as:  ¨ Increased pain, swelling, warmth, or redness around the cut. ¨ Red streaks leading from the cut. ¨ Pus draining from the cut. ¨ A fever. · You have new or more blood or blood clots in your urine. · You have pain in the flank, which is just below the rib cage and above the waist on either side of the back. · You have new or worse pain or burning when you urinate. · You cannot urinate or have trouble urinating. · You have a new or worse problem with controlling your bladder. · You have signs of a blood clot, such as:  ¨ Pain in your calf, back of the knee, thigh, or groin. ¨ Redness and swelling in your leg or groin. Watch closely for changes in your health, and be sure to contact your doctor if:  · You are not getting better as expected. · You do not have a bowel movement after taking a laxative. Where can you learn more? Go to http://maggie-martine.info/. Enter W023 in the search box to learn more about \"Transurethral Resection of the Prostate (TURP): What to Expect at Home. \"  Current as of: March 14, 2017  Content Version: 11.3  © 5380-2703 [employer]. Care instructions adapted under license by [employer] (which disclaims liability or warranty for this information). If you have questions about a medical condition or this instruction, always ask your healthcare professional. [address] any warranty or liability for your use of this information.

## 2017-07-18 NOTE — PROGRESS NOTES
Date/Time of Call:   07/18/17 1:30 PM   What was the patient hospitalized for? Patient was hospitalized for BPH   Does the patient understand his/her diagnosis and/or treatment and what happened during the hospitalization? Patient verbalized understanding of treatment and diagnosis. Did the patient receive discharge instructions? Patient states d/c instructions received. Review any discharge instructions (see notes in [facility]). Ask patient if they understand these. Do they have any questions? Patient discussed discharge plan and instruction as she understood it to be with Care Coordinator. Which I have documented in the pertinent areas throughout this progress note. Were home services ordered (nursing, PT, OT, ST, etc.)? HH was ordered at d/ for nursing and PT via [facility]. If so, has the first visit occurred? If not, why? (Assist with coordination of services if necessary.) Patient states a nurse from [facility] called this morning, but did not say when someone will be out according to patient. Will notify Monika Mensah RN Director. Was any DME ordered? Patient states he uses a walker. If so, has it been received? If not, why?  (Assist with coordination of arranging DME orders if necessary.) N/A   Complete a review of all medications (new, continued and discontinued meds per the D/C instructions and medication tab in [facility]). Watchung and Macrodantin were prescribed at d/c, and all other meds reviewed with Care Coordinator and Patient. Were all new prescriptions filled? If not, why?  (Assist with obtainment of medications if necessary.) Yes. Does the patient understand the purpose and dosing instructions for all medications? (If patient has questions, provide explanation and education.) Indicated by patient to care coordinator, the purpose and dosing instructions for all medications were understood.    Does the patient have any problems in performing ADLs? (If patient is unable to perform ADLs  what is the limiting factor(s)? Do they have a support system that can assist? If no support system is present, discuss possible assistance that they may be able to obtain.)  Patient states he is independent most ADLs. Patient states his son in law assist him with showers. Does the patient have all follow-up appointments scheduled? 7 day f/up with PCP?    7-14 day f/up with specialist?    If f/up has not been made  what actions has the care coordinator made to accomplish this? Has transportation been arranged? [facility] follow-up should be within 7 days of discharge; all others should have PCP follow-up within 7 days of discharge; follow-ups with other specialists as appropriate or ordered.)  Patient has f/u appt. with Urology 7/24 and that he has transportation. Patient accepted assistance in scheduling hospital f/u appt. Placed call to Dr. Andre Crane office to request scheduling. [place] [facility] [place] until 2pm for lunch. Will attempt to call back today, if not call to schedule as well as f/u call to patient will be placed tomorrow. Any other questions or concerns expressed by the patient? Patient stated gratitude for care and call. No further needs identified. JOHNATHON Call Completed By: Kate Pagan LPN  [facility] [address] Coordinator          This note will not be viewable in [address]

## 2017-07-20 NOTE — OP NOTES
[address]   OPERATIVE REPORT       Name:  Angela Peck   MR#:  [id number]   :  1935   Account #:  [de-identified]   Date of Adm:  2017       DATE OF PROCEDURE: 2017    PREOPERATIVE DIAGNOSIS: BPH with lower urinary tract symptoms. POSTOPERATIVE DIAGNOSIS: BPH with lower urinary tract symptoms. SURGEON: Corby Ramirez MD    ANESTHESIA: Spinal.     PROCEDURE PERFORMED: Bipolar vaporization of the prostate. INDICATIONS FOR PROCEDURE: The patient is an 72-year-old, white   male with progressive lower urinary tract symptoms. On   cystoscopy, he was found to have a short but obstructing   prostate gland. The patient was offered bipolar   vaporization, especially in light of the fact that he is   on Eliquis and will need to resume anticoagulation fairly   soon. Risks, benefits and alternatives were fully discussed. DESCRIPTION OF PROCEDURE: The patient received prophylactic IV   antibiotics. Spinal anesthesia was attained. The patient was   placed in the dorsal lithotomy position, prepped and draped   sterilely. The 24-Danish Olympus continuous flow resectoscope   and obturator were inserted into the bladder without difficulty. The 30-degree lens and video camera were utilized to perform   cystoscopy. The prostate was short in length, but there was   lateral lobe coaptation and an elevated median bar. There were no   tumors seen within the bladder. The settings on the bipolar were   200 for vaporization and 120 for coag. A widely patent channel   was created from the bladder neck to the verumontanum. There was   minimal bleeding encountered. All bleeding was stopped as it was   encountered. Upon completion of the procedure, the fluid was   turned off, and at the verumontanum there was a widely patent   channel with no active bleeding. A 22-Danish 3-way 30 mL balloon   catheter then was inserted and irrigated clear. This was connected   to continuous irrigation. There were no specimens to Pathology. There were no apparent complications. There was very minimal blood   loss.         MD Lisa Kay   D:  07/14/2017   17:18   T:  07/20/2017   10:07   Job #:  [id number]

## 2017-07-22 NOTE — TELEPHONE ENCOUNTER
I talked with daughter . He had TURP recently and now has increasing pedal edema and dyspnea  They have metolazone and will take 5mg before lasix today and tomorrow    bmp bnp on Monday f/u with office Dr Nathan Brood nurse.

## 2017-07-24 NOTE — DISCHARGE SUMMARY
[address] SUMMARY       Name:  Jim Patel   MR#:  [id number]   :  1935   Account #:  [de-identified]   Date of Adm:  2017       DISCHARGE DIAGNOSES   1. Benign prostatic hyperplasia with lower urinary tract symptoms. 2. Cardiac arrhythmia. 3. Coronary artery disease, status post coronary artery bypass. 4. Congestive heart failure. 5. Renal cyst.   6. Chronic obstructive pulmonary disease. 7. Gastroesophageal reflux disease. 8. Hypertension. 9. Dyslipidemia. 10. Aortic insufficiency. HISTORY OF PRESENT ILLNESS:   The patient is an 70-year-old white   male with progressive lower urinary tract symptoms. He is on   Eliquis for cardiac arrhythmia. The patient has had continued   lower urinary tract symptoms despite medical therapy. Cystoscopy was performed revealing a shortened length but   obstructing prostate and recommendation was made for bipolar   vaporization of the prostate and the patient was brought in for   this on . PAST MEDICAL HISTORY:  Significant for coronary artery bypass,   bilateral cataract removal and an ICD implantation. MEDICATIONS AT HOME INCLUDE   1. Amiodarone 200 mg b.i.d.   2. Eliquis 2.5 mg b.i.d.   3. Zaroxolyn 5 mg as needed. 4. Melatonin 5 mg at night with potassium chloride 20 mEq daily. 5. Flomax 0.4 mg daily. 6. Lasix 80 mg daily. 7. Lisinopril 40 mg daily. 8. Zocor 20 mg nightly. 9. Risperdal 3 mg nightly. 10. Metoprolol XL 50 mg b.i.d.   11. Singulair 1 at bedtime. 12. Benadryl 25 mg nightly. 13. Albuterol inhaler. 14. Vitamin D. 15. Vitamin E.   16. Symbicort inhaler. 17. Vitamin D3.   18. Baby aspirin 81 mg. 19. Prilosec 20 mg daily. ALLERGIES:  THE PATIENT HAS NO KNOWN DRUG ALLERGIES. SOCIAL HISTORY:  The patient is . He is a former smoker. He stopped in . He uses a light amount of alcohol.       REVIEW OF SYSTEMS:  Positive for frequency, urgency, decreased   strain without hematuria. He does have chronic shortness of   breath. HOSPITAL COURSE:  The patient underwent an uneventful bipolar   vaporization of the prostate. Postoperatively, the catheter was   removed the first postoperative day as the patient could not   void. With reinsertion of the catheter, there was more bleeding   noted and the patient was kept over. At this time, the urine is   virtually clear. Our plans are to remove the catheter today,   discharge the patient. He is to followup in 3 to 4 weeks. He is discharged on his usual medications, except he resumed   Eliquis on Thursday. He will also be discharged on antibiotics. This will be Cipro 250 mg. He has limited activity. No lifting   over 15 pounds for the next month. He is discharged on his usual   diet.                    MD CELIA Valdes / MAURICIO   D:  07/17/2017   07:17   T:  07/24/2017   07:07   Job #:  [id number]

## 2017-07-28 NOTE — ED TRIAGE NOTES
PT arrived to ED c/o chest pain x 2 days with SOB. Pt reports a periodic cough with occasional clear phlegm. Pt has PmHx of COPD, CHF, CABG x4, and HTN. Pt had prostate surgery on 7/14. Pt reports he was re-cathed today and has been passing blood and blood clots in his urine. Pt reports he wears oxygen at 3 L via NC at all times. Pt's room air Sp02 93%.

## 2017-07-28 NOTE — ED PROVIDER NOTES
HPI Comments: Pt with CHF and COPD. Has had weakness for past month. Has TURP 7/14 with cantu removed Monday and urinary obstruction yesterday. Cantu placed this a.m. Has had good urine output since then. Patient has been more weak for the past week with near syncopal episode prior to arrival.  Patient has also had left lower chest pain since yesterday. Very tender to touch worse with deep inspiration. No injury. No increase in shortness of breath. On 3 L of oxygen at home all the time. Patient is a 80 y.o. male presenting with chest pain. The history is provided by the patient. No  was used. Chest Pain (Angina)    This is a new problem. The current episode started yesterday. The problem has not changed since onset. The problem occurs constantly. The pain is associated with normal activity. The pain is present in the left side. The pain is mild. The pain does not radiate. The symptoms are aggravated by palpation and deep breathing. Associated symptoms include near-syncope and weakness. Pertinent negatives include no abdominal pain, no back pain, no cough, no diaphoresis, no dizziness, no exertional chest pressure, no fever, no headaches, no irregular heartbeat, no leg pain, no lower extremity edema, no malaise/fatigue, no nausea, no numbness, no palpitations, no shortness of breath and no vomiting. He has tried nothing for the symptoms. Risk factors include male gender and hypertension. His past medical history is significant for HTN and CHF. Procedural history includes AICD and CABG.        Past Medical History:   Diagnosis Date    Arrhythmia     paroxysmal a fib    Arthritis     knees and hands    Atelectasis 2/5/2015    CAD (coronary artery disease) 2006    CABG with 4 grafts, no Mi, no stents    Carotid stenosis without infarct     Chest trauma     right sided from [facility], remote    CHF (congestive heart failure) ([facility] [address] 9/13/2013    Compensated      Chronic kidney disease stage lll CKD last creatinine 2.0, today 1.9    Chronic obstructive pulmonary disease (HCC)     Chronic pain     arthritic    Chronic systolic heart failure ([facility] [address] 5/30/2014    COPD (chronic obstructive pulmonary disease) ([facility] [address] 9/13/2013    Coronary atherosclerosis of native coronary vessel     Elevated hemidiaphragm     right     GERD (gastroesophageal reflux disease) 9/13/2013    Heart failure ([facility] [address]     EF 25-30%    Heart failure, left, with LVEF >40% (HCC)     60%-65% on cath 11/2006    HTN (hypertension) 9/13/2013    Hyperlipidemia 9/13/2013    Hypertension     ICD (implantable cardioverter-defibrillator) in place 5/30/2014    [facility] - May 2014     Liver disease     patient denies    Other chest pain 9/13/2013    Atypical, chest tightness     Pleural effusion, left 12/2006    Prostatic hypertrophy, benign 9/13/2013    Psychiatric disorder     anxiety    Restrictive lung disease     Rheumatic aortic insufficiency     Rhinitis 2/5/2015    S/P CABG (coronary artery bypass graft)        Past Surgical History:   Procedure Laterality Date    CARDIAC SURG PROCEDURE UNLIST  11/2006    cabg x 4    CHEST SURGERY PROCEDURE UNLISTED  03/2017    fluid removed from left lung    HX CATARACT REMOVAL      bilateral    HX PACEMAKER  5/2014    ICD         Family History:   Problem Relation Age of Onset    Hypertension Other     Stroke Other      CVA    Other Other      renal failure       Social History     Social History    Marital status:      Spouse name: N/A    Number of children: N/A    Years of education: N/A     Occupational History    , retired      Social History Main Topics    Smoking status: Former Smoker     Packs/day: 1.00     Years: 30.00     Quit date: 1/1/1989    Smokeless tobacco: Never Used    Alcohol use 1.5 oz/week     3 Cans of beer per week      Comment: occasional    Drug use: No    Sexual activity: Yes     Partners: Female     Other Topics Concern    Not on file     Social History Narrative    He is  and has two children. He is a retired .          ALLERGIES: Review of patient's allergies indicates no known allergies. Review of Systems   Constitutional: Positive for fatigue. Negative for chills, diaphoresis, fever and malaise/fatigue. HENT: Negative for rhinorrhea and sore throat. Eyes: Negative for pain and redness. Respiratory: Negative for cough, chest tightness, shortness of breath and wheezing. Cardiovascular: Positive for chest pain and near-syncope. Negative for palpitations and leg swelling. Gastrointestinal: Negative for abdominal pain, diarrhea, nausea and vomiting. Genitourinary: Positive for hematuria. Negative for dysuria. Musculoskeletal: Negative for back pain, gait problem, neck pain and neck stiffness. Skin: Negative for color change and rash. Neurological: Positive for weakness and light-headedness. Negative for dizziness, syncope, numbness and headaches. Vitals:    07/27/17 2322   BP: 97/40   Pulse: 70   Resp: 18   Temp: 98.7 °F (37.1 °C)   SpO2: 93%   Weight: 63.5 kg (140 lb)   Height: 5' 7\" (1.702 m)            Physical Exam   Constitutional: He is oriented to person, place, and time. He appears well-developed and well-nourished. No distress. HENT:   Head: Normocephalic and atraumatic. Eyes: Conjunctivae and EOM are normal. Pupils are equal, round, and reactive to light. Neck: Normal range of motion. Neck supple. Cardiovascular: Normal rate and regular rhythm. Pulmonary/Chest: Effort normal and breath sounds normal. He has no wheezes. He exhibits tenderness (left lower chest wall towards axilla with TTP over ribs. no ecchymosis or crepitus. ). Abdominal: Soft. Bowel sounds are normal. There is no tenderness. Musculoskeletal: Normal range of motion. He exhibits no edema. Neurological: He is alert and oriented to person, place, and time. No cranial nerve deficit.  He exhibits normal muscle tone. Coordination normal.   Skin: Skin is warm and dry. Nursing note and vitals reviewed. MDM  Number of Diagnoses or Management Options  Diagnosis management comments: Mild volume overload with weakness and fatigue. Also hypokalemia. On potassium at home with PCP monitoring. Will discharge. Amount and/or Complexity of Data Reviewed  Clinical lab tests: ordered and reviewed  Tests in the radiology section of CPT®: ordered and reviewed  Tests in the medicine section of CPT®: ordered and reviewed    Patient Progress  Patient progress: stable    ED Course       Procedures         EKG: nonspecific ST and T waves changes. Paced rhythm rate 70. XR CHEST PA LAT (Final result) Result time: 07/28/17 00:32:50     Final result by Elysia Mullen MD (07/28/17 00:32:50)     Narrative:     Frontal and lateral views of the chest     COMPARISON: March 27, 2017. CLINICAL HISTORY: Chest pain. FINDINGS:    There is a stable left-sided cardiac pacer. There is suggestion of tiny  bilateral pleural effusions. No focal pulmonary consolidation, pneumothorax or  pulmonary edema. Cardiomediastinal contour appears within normal limits. There  are postsurgical changes of sternotomy. Bones are osteopenic. IMPRESSION:    Suggestion of tiny bilateral pleural effusions.  No pulmonary consolidation.          Results Include:    Recent Results (from the past 24 hour(s))   EKG, 12 LEAD, INITIAL    Collection Time: 07/27/17 11:23 PM   Result Value Ref Range    Ventricular Rate 96 BPM    Atrial Rate 69 BPM    QRS Duration 94 ms    Q-T Interval 450 ms    QTC Calculation (Bezet) 568 ms    Calculated R Axis 15 degrees    Calculated T Axis -173 degrees    Diagnosis       Atrial-paced rhythm with frequent ventricular-paced complexes and Premature   supraventricular complexes  ST & T wave abnormality, consider inferior ischemia  ST & T wave abnormality, consider anterolateral ischemia  Abnormal ECG  When compared with ECG of 23-MAR-2017 17:05,  Previous ECG has undetermined rhythm, needs review     CBC WITH AUTOMATED DIFF    Collection Time: 07/27/17 11:49 PM   Result Value Ref Range    WBC 9.3 4.3 - 11.1 K/uL    RBC 4.06 (L) 4.23 - 5.67 M/uL    HGB 12.4 (L) 13.6 - 17.2 g/dL    HCT 37.1 (L) 41.1 - 50.3 %    MCV 91.4 79.6 - 97.8 FL    MCH 30.5 26.1 - 32.9 PG    MCHC 33.4 31.4 - 35.0 g/dL    RDW 13.8 11.9 - 14.6 %    PLATELET 388 873 - 146 K/uL    MPV 9.6 (L) 10.8 - 14.1 FL    DF AUTOMATED      NEUTROPHILS 82 (H) 43 - 78 %    LYMPHOCYTES 9 (L) 13 - 44 %    MONOCYTES 8 4.0 - 12.0 %    EOSINOPHILS 1 0.5 - 7.8 %    BASOPHILS 0 0.0 - 2.0 %    IMMATURE GRANULOCYTES 0.2 0.0 - 5.0 %    ABS. NEUTROPHILS 7.6 1.7 - 8.2 K/UL    ABS. LYMPHOCYTES 0.8 0.5 - 4.6 K/UL    ABS. MONOCYTES 0.7 0.1 - 1.3 K/UL    ABS. EOSINOPHILS 0.1 0.0 - 0.8 K/UL    ABS. BASOPHILS 0.0 0.0 - 0.2 K/UL    ABS. IMM. GRANS. 0.0 0.0 - 0.5 K/UL   METABOLIC PANEL, COMPREHENSIVE    Collection Time: 07/27/17 11:49 PM   Result Value Ref Range    Sodium 131 (L) 136 - 145 mmol/L    Potassium 2.8 (LL) 3.5 - 5.1 mmol/L    Chloride 79 (L) 98 - 107 mmol/L    CO2 41 (HH) 21 - 32 mmol/L    Anion gap 11 7 - 16 mmol/L    Glucose 115 (H) 65 - 100 mg/dL    BUN 31 (H) 8 - 23 MG/DL    Creatinine 2.65 (H) 0.8 - 1.5 MG/DL    GFR est AA 30 (L) >60 ml/min/1.73m2    GFR est non-AA 25 (L) >60 ml/min/1.73m2    Calcium 8.3 8.3 - 10.4 MG/DL    Bilirubin, total 0.6 0.2 - 1.1 MG/DL    ALT (SGPT) 13 12 - 65 U/L    AST (SGOT) 13 (L) 15 - 37 U/L    Alk.  phosphatase 73 50 - 136 U/L    Protein, total 6.4 6.3 - 8.2 g/dL    Albumin 2.8 (L) 3.2 - 4.6 g/dL    Globulin 3.6 (H) 2.3 - 3.5 g/dL    A-G Ratio 0.8 (L) 1.2 - 3.5     TROPONIN I    Collection Time: 07/27/17 11:49 PM   Result Value Ref Range    Troponin-I, Qt. 0.06 (H) 0.02 - 0.05 NG/ML   POC TROPONIN-I    Collection Time: 07/27/17 11:50 PM   Result Value Ref Range    Troponin-I (POC) 0.07 0.0 - 0.08 ng/ml        POC TROPONIN-I (Final result) Component (Lab Inquiry)       Collection Time Result Time TNIPOC     07/28/17 04:17:00 07/28/17 04:33:42 (NOTE)   Values ranging from 0.00 to 0.08 ng/ml represent a 99 percentile    ranking of individuals from a test population that demonstrates a    healthy clinical picture. The ACC recommends that the term myocardial infarction   should be used when there is evidence of myocardial necrosis   in a clinical setting consistent with myocardial ischemia. Under these conditions, any of the following meet the   diagnosis for myocardial infarction:      Detection of rise and/or fall of cardiac biomarkers   (preferably Troponin-I) with at least one value above the   99th percentile of the upper reference limit (URL) together   with evidence of myocardial ischemia with one of the   following: symptoms of ischemia, ECG changes indicative of   new ischemia, development of pathological Q waves, imaging   evidence of new loss of viable myocardium or new regional   wall motion abnormality. Sequential testing is recommended. Cardiac Troponin-I has a    relatively long half-life and may b e present well after the CK MB has    returned to baseline.    ' data-bubble=\"IP_[email or web address]_BUBBLE_SERVICE\">0.07  (NOTE)   Values ranging. ..

## 2017-07-28 NOTE — DISCHARGE INSTRUCTIONS
Musculoskeletal Chest Pain: Care Instructions  Your Care Instructions  Chest pain is not always a sign that something is wrong with your heart or that you have another serious problem. The doctor thinks your chest pain is caused by strained muscles or ligaments, inflamed chest cartilage, or another problem in your chest, rather than by your heart. You may need more tests to find the cause of your chest pain. Follow-up care is a key part of your treatment and safety. Be sure to make and go to all appointments, and call your doctor if you are having problems. Its also a good idea to know your test results and keep a list of the medicines you take. How can you care for yourself at home? · Take pain medicines exactly as directed. ¨ If the doctor gave you a prescription medicine for pain, take it as prescribed. ¨ If you are not taking a prescription pain medicine, ask your doctor if you can take an over-the-counter medicine. · Rest and protect the sore area. · Stop, change, or take a break from any activity that may be causing your pain or soreness. · Put ice or a cold pack on the sore area for 10 to 20 minutes at a time. Try to do this every 1 to 2 hours for the next 3 days (when you are awake) or until the swelling goes down. Put a thin cloth between the ice and your skin. · After 2 or 3 days, apply a heating pad set on low or a warm cloth to the area that hurts. Some doctors suggest that you go back and forth between hot and cold. · Do not wrap or tape your ribs for support. This may cause you to take smaller breaths, which could increase your risk of lung problems. · Mentholated creams such as Bengay or Icy Hot may soothe sore muscles. Follow the instructions on the package. · Follow your doctor's instructions for exercising. · Gentle stretching and massage may help you get better faster. Stretch slowly to the point just before pain begins, and hold the stretch for at least 15 to 30 seconds.  Do this 3 or 4 times a day. Stretch just after you have applied heat. · As your pain gets better, slowly return to your normal activities. Any increased pain may be a sign that you need to rest a while longer. When should you call for help? Call 911 anytime you think you may need emergency care. For example, call if:  · You have chest pain or pressure. This may occur with:  ¨ Sweating. ¨ Shortness of breath. ¨ Nausea or vomiting. ¨ Pain that spreads from the chest to the neck, jaw, or one or both shoulders or arms. ¨ Dizziness or lightheadedness. ¨ A fast or uneven pulse. After calling 911, chew 1 adult-strength aspirin. Wait for an ambulance. Do not try to drive yourself. · You have sudden chest pain and shortness of breath, or you cough up blood. Call your doctor now or seek immediate medical care if:  · You have any trouble breathing. · Your chest pain gets worse. · Your chest pain occurs consistently with exercise and is relieved by rest.  Watch closely for changes in your health, and be sure to contact your doctor if:  · Your chest pain does not get better after 1 week. Where can you learn more? Go to http://maggie-martine.info/. Enter V293 in the search box to learn more about \"Musculoskeletal Chest Pain: Care Instructions. \"  Current as of: March 20, 2017  Content Version: 11.3  © 7182-6040 [employer]. Care instructions adapted under license by [employer] (which disclaims liability or warranty for this information). If you have questions about a medical condition or this instruction, always ask your healthcare professional. [place] [address] any warranty or liability for your use of this information. Electrolyte Imbalance: Care Instructions  Your Care Instructions  Electrolytes are minerals in your blood. They include sodium, potassium, calcium, and magnesium. When they are not at the right levels, you can feel very ill.  You may not know what is causing it, but you know something is wrong. You may feel weak or numb, have muscle spasms, or twitch. Your heart may beat fast. Symptoms are different with each mineral. Too much is as bad as too little. Minerals help keep your body working as it should. Vomiting, diarrhea, and fever can cause you to lose minerals. A problem with your kidneys can tip a mineral out of balance. So can taking certain medicines. Your doctor may do more tests. He or she may change your medicine and diet. If you are low in one or more minerals, they may be given through a tube into your vein (IV). Your doctor may have you take or drink special fluids or pills. If your kidneys are failing, your blood may be filtered. This is called dialysis. It can put the minerals back in balance. Follow-up care is a key part of your treatment and safety. Be sure to make and go to all appointments, and call your doctor if you are having problems. It's also a good idea to know your test results and keep a list of the medicines you take. How can you care for yourself at home? · Take your medicines exactly as prescribed. Call your doctor if you have any problems with your medicines. You will get more details on the specific medicines your doctor prescribes. · Do not take any medicine without talking to your doctor first. This includes prescription, over-the-counter, and herbal medicines. · If you have kidney, heart, or liver disease and have to limit fluids, talk with your doctor before you increase the amount of fluids you drink. · Your doctor or dietitian may give you a diet plan to help balance your minerals. Follow the diet carefully. When should you call for help? Call 911 anytime you think you may need emergency care. For example, call if:  · You passed out (lost consciousness). · Your heart seems to be speeding up and then slowing down or skipping beats.   Call your doctor now or seek immediate medical care if:  · You are very tired and have no energy. · You have trouble thinking or concentrating. Watch closely for changes in your health, and be sure to contact your doctor if:  · You want advice on what to do to keep your minerals in balance. · You do not get better as expected. Where can you learn more? Go to http://maggie-martine.info/. Enter T900 in the search box to learn more about \"Electrolyte Imbalance: Care Instructions. \"  Current as of: July 26, 2016  Content Version: 11.3  © 7646-4289 [employer]. Care instructions adapted under license by [employer] (which disclaims liability or warranty for this information). If you have questions about a medical condition or this instruction, always ask your healthcare professional. [address] any warranty or liability for your use of this information. Fatigue: Care Instructions  Your Care Instructions  Fatigue is a feeling of tiredness, exhaustion, or lack of energy. You may feel fatigue because of too much or not enough activity. It can also come from stress, lack of sleep, boredom, and poor diet. Many medical problems, such as viral infections, can cause fatigue. Emotional problems, especially depression, are often the cause of fatigue. Fatigue is most often a symptom of another problem. Treatment for fatigue depends on the cause. For example, if you have fatigue because you have a certain health problem, treating this problem also treats your fatigue. If depression or anxiety is the cause, treatment may help. Follow-up care is a key part of your treatment and safety. Be sure to make and go to all appointments, and call your doctor if you are having problems. It's also a good idea to know your test results and keep a list of the medicines you take. How can you care for yourself at home? · Get regular exercise. But don't overdo it. Go back and forth between rest and exercise.   · Get plenty of rest.  · Eat a healthy diet. Do not skip meals, especially breakfast.  · Reduce your use of caffeine, tobacco, and alcohol. Caffeine is most often found in coffee, tea, cola drinks, and chocolate. · Limit medicines that can cause fatigue. This includes tranquilizers and cold and allergy medicines. When should you call for help? Watch closely for changes in your health, and be sure to contact your doctor if:  · You have new symptoms such as fever or a rash. · Your fatigue gets worse. · You have been feeling down, depressed, or hopeless. Or you may have lost interest in things that you usually enjoy. · You are not getting better as expected. Where can you learn more? Go to http://maggie-martine.info/. Enter K872 in the search box to learn more about \"Fatigue: Care Instructions. \"  Current as of: March 20, 2017  Content Version: 11.3  © 4578-7193 [employer]. Care instructions adapted under license by [employer] (which disclaims liability or warranty for this information). If you have questions about a medical condition or this instruction, always ask your healthcare professional. [place] [address] any warranty or liability for your use of this information.

## 2017-08-11 NOTE — PROGRESS NOTES
UTR pt. Pauline Mcginnis LPN/ Care Coordinator  [address]  [address] [place] [address]. [facility] [place], [address] [facility]  [email or web address] note will not be viewable in [address]

## 2017-08-18 PROBLEM — I34.0 NON-RHEUMATIC MITRAL REGURGITATION: Status: ACTIVE | Noted: 2017-01-01

## 2017-09-27 PROBLEM — N39.0 UTI (URINARY TRACT INFECTION): Status: ACTIVE | Noted: 2017-01-01

## 2017-09-27 PROBLEM — R29.6 FREQUENT FALLS: Status: ACTIVE | Noted: 2017-01-01

## 2017-09-27 PROBLEM — J81.1 PULMONARY EDEMA: Status: ACTIVE | Noted: 2017-01-01

## 2017-09-28 PROBLEM — Z66 DNR (DO NOT RESUSCITATE): Status: ACTIVE | Noted: 2017-01-01

## 2017-09-28 NOTE — PROGRESS NOTES
Problem: Mobility Impaired (Adult and Pediatric)  Goal: *Acute Goals and Plan of Care (Insert Text)  STG:  (1.)Mr. Eddie Bey will move from supine to sit and sit to supine , scoot up and down and roll side to side with STAND BY ASSIST within 3 day(s). (2.)Mr. Eddie Bey will transfer from bed to chair and chair to bed with STAND BY ASSIST using the least restrictive device within 3 day(s). (3.)Mr. Eddie Bey will ambulate with CONTACT GUARD ASSIST for 50 feet with the least restrictive device within 3 day(s). LTG:  (1.)Mr. Eddie Bey will move from supine to sit and sit to supine , scoot up and down and roll side to side in bed with INDEPENDENT within 7 day(s). (2.)Mr. Eddie Bey will transfer from bed to chair and chair to bed with MODIFIED INDEPENDENCE using the least restrictive device within 7 day(s). (3.)Mr. Eddie Bey will ambulate with STAND BY ASSIST for 200+ feet with the least restrictive device within 7 day(s). ________________________________________________________________________________________________      PHYSICAL THERAPY: INITIAL ASSESSMENT, TREATMENT DAY: DAY OF ASSESSMENT, AM 9/28/2017  INPATIENT: Hospital Day: 2  [place] / Product Type: Medicare /      NAME/AGE/GENDER: Kiarra Mendez is a 80 y.o. male     PRIMARY DIAGNOSIS: Frequent falls UTI (urinary tract infection) UTI (urinary tract infection)        ICD-10: Treatment Diagnosis:       · Generalized Muscle Weakness (M62.81)  · Difficulty in walking, Not elsewhere classified (R26.2)  · History of falling (Z91.81)   Precaution/Allergies:  Review of patient's allergies indicates no known allergies. ASSESSMENT:      Mr. Eddie Bey is sitting EOB finishing work with OT upon contact and agreeable to PT evaluation this morning. Pt reports increased R flank pain 7-8/10 secondary to recent fall. Bruising noted at R flank region.  Pt reports living in 1 story home with his daughter and her family with 3 steps to enter with 0 railing. Pt states he hold onto his son in law for entering the home. Pt reports ambulating household distances with use of rollator and requires assist with dressing and bathing from son in law at baseline. Pt reports 3 falls in past 6 months and requires continuous supplemental O2. Pt is [facility] with sit to stand to RW and ambulated 15 ft in room with [facility]. Pt demonstrates some unsteadiness on feet with gait this session. Pt returned to sitting EOB and assisted supine into bed requiring modA to lift B LE into bed. Pt left supine with all needs met and within reach. Sravani Delarosa will benefit from skilled PT (medically necessary) to address decreased strength, decreased balance, decreased functional tolerance, decreased cardiopulmonary endurance affecting participation in basic ADLs and functional tasks. This section established at most recent assessment   PROBLEM LIST (Impairments causing functional limitations):  1. Decreased Strength  2. Decreased ADL/Functional Activities  3. Decreased Transfer Abilities  4. Decreased Ambulation Ability/Technique  5. Decreased Balance  6. Increased Pain  7. Decreased Activity Tolerance  8. Increased Fatigue  9. Increased Shortness of Breath  10. Decreased [place] with Home Exercise Program    INTERVENTIONS PLANNED: (Benefits and precautions of physical therapy have been discussed with the patient.)  1. Balance Exercise  2. Bed Mobility  3. Family Education  4. Gait Training  5. Home Exercise Program (HEP)  6. Neuromuscular Re-education/Strengthening  7. Range of Motion (ROM)  8. Therapeutic Activites  9. Therapeutic Exercise/Strengthening  10. Transfer Training  11.  Group Therapy      TREATMENT PLAN: Frequency/Duration: 3 times a week for duration of hospital stay  Rehabilitation Potential For Stated Goals: Aleida Vizcaino REHABILITATION/EQUIPMENT: (at time of discharge pending progress): Due to the probability of continued deficits (see above) this patient will likely need continued skilled physical therapy after discharge. Equipment:   · None at this time                   HISTORY:   History of Present Injury/Illness (Reason for Referral):  See H&P below  Rangel Du is a 80 y.o. male with past medical history of PAF, CAD with remote CABG, chronic systolic heart failure, COPD, HTN, hyperlipidemia, ICD in place and chronic CKD who presented to the ER with complaints of frequent falls. Patient's daughter is present at the bedside and states that since Monday the patient has started falling. He does report some shortness of breath, but states he has been dealing with this for years and has not recognized a huge change. He does admitted to sleeping upright at times. He was seen by his PCP on Monday of this week and diagnosed with an UTI. Per his daughter he was started on Bactrim. Upon arrival to the ER, his O2 sat was recorded as 89% on 3LNC. CXR showed moderate bilateral pleural effusions with pulmonary edema. BNP noted to be 1967. While in the ER, he was treated with 60mg IV lasix with little urine output. Past Medical History/Comorbidities:   Mr. Evelia Jain  has a past medical history of Arrhythmia; Arthritis; Atelectasis (2/5/2015); CAD (coronary artery disease) (2006); Carotid stenosis without infarct; Chest trauma; CHF (congestive heart failure) ([address] (9/13/2013); Chronic kidney disease; Chronic obstructive pulmonary disease ([address] Chronic pain; Chronic systolic heart failure (HCC) (5/30/2014); COPD (chronic obstructive pulmonary disease) ([address] (9/13/2013); Coronary atherosclerosis of native coronary vessel; Elevated hemidiaphragm; GERD (gastroesophageal reflux disease) (9/13/2013); Heart failure ([address] Heart failure, left, with LVEF >40% ([address] HTN (hypertension) (9/13/2013); Hyperlipidemia (9/13/2013); Hypertension; ICD (implantable cardioverter-defibrillator) in place (5/30/2014); Liver disease; Other chest pain (9/13/2013);  Pleural effusion, left (12/2006); Prostatic hypertrophy, benign (9/13/2013); Psychiatric disorder; Restrictive lung disease; Rheumatic aortic insufficiency; Rhinitis (2/5/2015); and S/P CABG (coronary artery bypass graft). Mr. Laverne Puentes  has a past surgical history that includes cataract removal; chest surgery procedure unlisted (03/2017); pacemaker (5/2014); and cardiac surg procedure unlist (11/2006). Social History/Living Environment:   Home Environment: Private residence  # Steps to Enter: 3  Rails to Enter: No  One/Two Story Residence: One story  Living Alone: No  Support Systems: Family member(s)  Patient Expects to be Discharged to[de-identified] Private residence  Current DME Used/Available at Home: Levonne Abelson, rollator, Oxygen, portable, Shower chair  Tub or Shower Type: Tub/Shower combination  Prior Level of Function/Work/Activity:  Lives with daughter and her family, ambulates with use of rollator for household distances, requires assist with bathing and dressing, 3 falls   Number of Personal Factors/Comorbidities that affect the Plan of Care: 3+: HIGH COMPLEXITY   EXAMINATION:   Most Recent Physical Functioning:   Gross Assessment:  AROM: Generally decreased, functional  Strength: Generally decreased, functional  Coordination: Generally decreased, functional  Sensation: Intact               Posture:     Balance:  Sitting: Intact; Without support  Standing: Impaired;Pull to stand; With support Bed Mobility:  Sit to Supine: Moderate assistance  Wheelchair Mobility:     Transfers:  Sit to Stand: Minimum assistance  Stand to Sit: Minimum assistance  Gait:     Base of Support: Narrowed; Center of gravity altered  Speed/Lyla: Shuffled; Slow  Step Length: Left shortened;Right shortened  Gait Abnormalities: Decreased step clearance; Path deviations  Distance (ft): 15 Feet (ft)  Assistive Device: Walker, rolling  Ambulation - Level of Assistance: Contact guard assistance;Minimal assistance  Interventions: Safety awareness training; Tactile cues; Verbal cues Body Structures Involved:  1. Nerves  2. Heart  3. Lungs  4. Bones  5. Joints  6. Muscles  7. Ligaments Body Functions Affected:  1. Sensory/Pain  2. Cardio  3. Respiratory  4. Neuromusculoskeletal  5. Movement Related Activities and Participation Affected:  1. General Tasks and Demands  2. Mobility  3. Self Care  4. Interpersonal Interactions and Relationships   Number of elements that affect the Plan of Care: 4+: HIGH COMPLEXITY   CLINICAL PRESENTATION:   Presentation: Stable and uncomplicated: LOW COMPLEXITY   CLINICAL DECISION MAKIN[address] [place] [address] AM-PAC 6 Clicks   Basic Mobility Inpatient Short Form  How much difficulty does the patient currently have. .. Unable A Lot A Little None   1. Turning over in bed (including adjusting bedclothes, sheets and blankets)? [ ] 1   [ ] 2   [X] 3   [ ] 4   2. Sitting down on and standing up from a chair with arms ( e.g., wheelchair, bedside commode, etc.)   [ ] 1   [ ] 2   [X] 3   [ ] 4   3. Moving from lying on back to sitting on the side of the bed? [ ] 1   [ ] 2   [X] 3   [ ] 4   How much help from another person does the patient currently need. .. Total A Lot A Little None   4. Moving to and from a bed to a chair (including a wheelchair)? [ ] 1   [ ] 2   [X] 3   [ ] 4   5. Need to walk in hospital room? [ ] 1   [X] 2   [ ] 3   [ ] 4   6. Climbing 3-5 steps with a railing? [ ] 1   [X] 2   [ ] 3   [ ] 4   © , Trustees of [address] [place] [address], under license to [email or web address]. All rights reserved    Score:  Initial: 16 Most Recent: X (Date: -- )     Interpretation of Tool:  Represents activities that are increasingly more difficult (i.e. Bed mobility, Transfers, Gait).        Score 24 23 22-20 19-15 14-10 9-7 6       Modifier CH CI CJ CK CL CM CN         · Mobility - Walking and Moving Around:               - CURRENT STATUS:    CK - 40%-59% impaired, limited or restricted               - GOAL STATUS:           CJ - 20%-39% impaired, limited or restricted               - D/C STATUS:                       ---------------To be determined---------------  [place] / [place] / Product Type: Medicare /       Medical Necessity:     · Patient is expected to demonstrate progress in strength, balance, coordination and functional technique to decrease assistance required with gait, transfers, and functional mobility. Reason for Services/Other Comments:  · Patient continues to require skilled intervention due to decreased strength, decreased balance, decreased functional tolerance, decreased cardiopulmonary endurance affecting participation in basic ADLs and functional tasks. Use of outcome tool(s) and clinical judgement create a POC that gives a: Clear prediction of patient's progress: LOW COMPLEXITY                 TREATMENT:   (In addition to Assessment/Re-Assessment sessions the following treatments were rendered)   Pre-treatment Symptoms/Complaints:  R side pain  Pain: Initial:   Pain Intensity 1: 8  Pain Location 1: Flank  Pain Orientation 1: Right  Post Session:  8/10, unchanged with activity. Assessment/Reassessment only, no treatment provided today     Braces/Orthotics/Lines/Etc:   · cantu catheter  · O2 Device: Nasal cannula  Treatment/Session Assessment:    · Response to Treatment:  Ambulated 15 ft in room with [facility]. Some unsteadiness on feet. · Interdisciplinary Collaboration:  · Physical Therapist  · Occupational Therapist  · Registered Nurse  · After treatment position/precautions:  · Supine in bed  · Bed alarm/tab alert on  · Bed/Chair-wheels locked  · Bed in low position  · Call light within reach  · RN notified  · Compliance with Program/Exercises: Will assess as treatment progresses. · Recommendations/Intent for next treatment session: \"Next visit will focus on advancements to more challenging activities and reduction in assistance provided\".   Total Treatment Duration:  PT Patient Time In/Time Out  Time In: 1001  Time Out: 2600 Arya Alvarez

## 2017-09-28 NOTE — PROGRESS NOTES
Bedside shift change report given to Sheridan Belcher [facility]. Report included the following information SBAR, Kardex, MAR and Recent Results.

## 2017-09-28 NOTE — PROGRESS NOTES
Problem: Falls - Risk of  Goal: *Absence of Falls  Document Giselle Fall Risk and appropriate interventions in the flowsheet.    Outcome: Progressing Towards Goal  Fall Risk Interventions:  Mobility Interventions: Patient to call before getting OOB, PT Consult for mobility concerns, Strengthening exercises (ROM-active/passive)           Medication Interventions: Bed/chair exit alarm, Patient to call before getting OOB, Teach patient to arise slowly     Elimination Interventions: Call light in reach, Patient to call for help with toileting needs, Toileting schedule/hourly rounds     History of Falls Interventions: Bed/chair exit alarm, Consult care management for discharge planning, Door open when patient unattended

## 2017-09-28 NOTE — PROGRESS NOTES
9/28/2017 9:58 AM    Admit Date: 9/27/2017    Admit Diagnosis: Frequent falls      Subjective:   No cp- breathing better but still sob      Objective:      Visit Vitals    BP 92/42    Pulse 63    Temp 99.4 °F (37.4 °C)  Comment: PATIENT WAS DRINKING HOT COFFEE.  Resp 20    Ht 5' 6\" (1.676 m)    Wt 68.5 kg (151 lb)    SpO2 93%    BMI 24.37 kg/m2       Physical Exam:  Gwinda David, Well Nourished, No Acute Distress, Alert & Oriented x 3, appropriate mood. Neck- supple, no JVD  CV- regular rate and rhythm no MRG  Lung- rhonchi bilaterally  Abd- soft, nontender, nondistended  Ext- no edema bilaterally. Skin- warm and dry        Data Review:   Recent Labs      09/28/17   0534   NA  135*   K  4.2   BUN  25*   CREA  2.26*   WBC  5.3   HGB  10.5*   HCT  31.7*   PLT  133*       Assessment/Plan:     Principal Problem:    UTI (urinary tract infection) (9/27/2017)    Active Problems:    Hyperlipidemia (9/13/2013)      HTN (hypertension) (9/13/2013)Stable. Continue current medical therapy. 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 - May 2014      Pulmonary emphysema ([facility] (11/2/2016) pulm to see today- may need thoracentesis  Acute systiolic chf- Improved with current therapy.  Will continue medications  Continue iv lasix- order am labs to monitor cr    CKD (chronic kidney disease) stage 3, GFR 30-59 ml/min (12/26/2016)cr stable- daily cr      Pleural effusion, bilateral (12/26/2016)      PAF (paroxysmal atrial fibrillation) ([address] (6/8/2017)      Frequent falls (9/27/2017)      Pulmonary edema (9/27/2017)      DNR (do not resuscitate) (9/28/2017)

## 2017-09-28 NOTE — PROGRESS NOTES
Problem: Self Care Deficits Care Plan (Adult)  Goal: *Acute Goals and Plan of Care (Insert Text)  1. Patient will complete upper body bathing and dressing with set-up and adaptive equipment as needed. 2. Patient will complete toileting with supervision. 3. Patient will tolerate 25 minutes of OT treatment with minimal rest breaks to increase activity tolerance for ADLs. 4. Patient will complete functional transfers with supevision and adaptive equipment as needed. 5. Patient will complete functional mobility for household distances with supervision and minimal cues for safety awareness. 6. Patient will complete ADL tasks for 15 minutes with supervision for dynamic standing balance to demonstrate decreased risk for falls. Timeframe: 7 visits       OCCUPATIONAL THERAPY: Initial Assessment 9/28/2017  INPATIENT: Hospital Day: 2  [place] / [place] / Product Type: Medicare /      NAME/AGE/GENDER: Jhoan Gomez is a 80 y.o. male     PRIMARY DIAGNOSIS:  Frequent falls UTI (urinary tract infection) UTI (urinary tract infection)        ICD-10: Treatment Diagnosis:        · Generalized Muscle Weakness (M62.81)  · Other lack of cordination (R27.8)  · Repeated Falls (R29.6)   Precautions/Allergies:         Review of patient's allergies indicates no known allergies. ASSESSMENT:      Mr. Bharat Flower presents to the hospital with UTI and frequent falls. Pt sitting on BSC upon arrival with nursing assistance at bedside. Pt was alert and oriented. Pt able to answer questions appropriately throughout evaluation. Pt leans to the L due to pain along his R side from recent fall. Pt reports he has had 3 recent falls. Pt needing minimal assistance to stand from [facility] with additional time. Pt noted to have some decreased standing balance, losing his balance while nursing assistant assisting with hygiene.  Pt needs assistance with turning with the use of the walker with impaired balance and overall decreased activity tolerance. Pt was needing assistance with ADL prior to admission as well. Pt has multiple bruises on his body from falls. Pt is currently functioning below baseline for ADL/functional transfers and will benefit from OT services to address stated goals and plan of care. This section established at most recent assessment   PROBLEM LIST (Impairments causing functional limitations):  1. Decreased Strength  2. Decreased ADL/Functional Activities  3. Decreased Transfer Abilities  4. Decreased Ambulation Ability/Technique  5. Decreased Balance  6. Increased Pain  7. Decreased Activity Tolerance  8. Decreased Flexibility/Joint Mobility  9. Decreased Skin Integrity/Hygeine  10. Decreased Smith with Home Exercise Program  11. Decreased Cognition    INTERVENTIONS PLANNED: (Benefits and precautions of occupational therapy have been discussed with the patient.)  1. Activities of daily living training  2. Adaptive equipment training  3. Balance training  4. Clothing management  5. Cognitive training  6. Donning&doffing training  7. Group therapy  8. Neuromuscular re-eduation  9. Therapeutic activity  10. Therapeutic exercise      TREATMENT PLAN: Frequency/Duration: Follow patient 3 times to address above goals. Rehabilitation Potential For Stated Goals: GOOD      RECOMMENDED REHABILITATION/EQUIPMENT: (at time of discharge pending progress): Due to the probability of continued deficits (see above) this patient will likely need continued skilled occupational therapy after discharge. Equipment:   · TBD                      OCCUPATIONAL PROFILE AND HISTORY:   History of Present Injury/Illness (Reason for Referral):  See H&P  Past Medical History/Comorbidities:   Mr. Nghia Horn  has a past medical history of Arrhythmia; Arthritis; Atelectasis (2/5/2015); CAD (coronary artery disease) (2006); Carotid stenosis without infarct; Chest trauma; [facility]congestive [facility]) ([facility] (9/13/2013);  Chronic kidney disease; Chronic obstructive pulmonary disease ([facility] Chronic pain; Chronic systolic heart failure (HCC) (5/30/2014); COPD (chronic obstructive pulmonary disease) ([facility] (9/13/2013); Coronary atherosclerosis of native coronary vessel; Elevated hemidiaphragm; GERD (gastroesophageal reflux disease) (9/13/2013); [facility] ([facility] Heart failure, left, with LVEF >40% ([facility] HTN (hypertension) (9/13/2013); Hyperlipidemia (9/13/2013); Hypertension; 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 Mr. Radha Marquez  has a past surgical history that includes cataract removal; chest surgery procedure unlisted (03/2017); pacemaker (5/2014); and cardiac surg procedure unlist (11/2006). Social History/Living Environment:   Home Environment: Private residence  # Steps to Enter: 3  Rails to Enter: No  One/Two Story Residence: One story  Living Alone: No  Support Systems: Family member(s)  Patient Expects to be Discharged to[de-identified] Private residence  Current DME Used/Available at Home: Walker, rollator, Oxygen, portable, Shower chair  Tub or Shower Type: Tub/Shower combination  Prior Level of Function/Work/Activity:  Pt lives with his daughter and son-in-law. Pt has been using a rollator with functional mobility with frequent falls. Pt was needing assistance for bathing/dressing from son-in-law. Pt reports he was getting to the bathroom on his own.    Personal Factors:          Social Background:  Frequent falls at home        Past/Current Experience:  Pt has been requiring assistance with ADLs        Other factors that influence how disability is experienced by the patient:  Multiple co-morbidities    Number of Personal Factors/Comorbidities that affect the Plan of Care: Extensive review of physical, cognitive, and psychosocial performance (3+):  HIGH COMPLEXITY   ASSESSMENT OF OCCUPATIONAL PERFORMANCE[de-identified]   Activities of Daily Living:           Basic ADLs (From Assessment) Complex ADLs (From Assessment)   Basic ADL  Feeding: Stand-by assistance  Oral Facial Hygiene/Grooming: Minimum assistance  Bathing: Moderate assistance  Upper Body Dressing: Moderate assistance  Lower Body Dressing: Maximum assistance  Toileting: Total assistance Instrumental ADL  Meal Preparation: Total assistance  Homemaking: Total assistance   Grooming/Bathing/Dressing Activities of Daily Living     Cognitive Retraining  Safety/Judgement: Decreased awareness of need for safety; Fall prevention;Home safety                 Functional Transfers  Toilet Transfer : Minimum assistance  Tub Transfer: Maximum assistance  Shower Transfer: Moderate assistance     Bed/Mat Mobility  Sit to Supine: Moderate assistance  Sit to Stand: Minimum assistance  Bed to Chair: Minimum assistance          Most Recent Physical Functioning:   Gross Assessment:  AROM: Generally decreased, functional (B UE)  Strength: Generally decreased, functional (B UE)               Posture:     Balance:  Sitting: Impaired  Sitting - Static: Good (unsupported)  Sitting - Dynamic: Fair (occasional)  Standing: Impaired  Standing - Static: Poor  Standing - Dynamic : Poor Bed Mobility:  Sit to Supine:  Moderate assistance  Wheelchair Mobility:     Transfers:  Sit to Stand: Minimum assistance  Stand to Sit: Minimum assistance  Bed to Chair: Minimum assistance              Patient Vitals for the past 6 hrs:       BP SpO2 O2 Flow Rate (L/min) Pulse   09/28/17 0452 113/51 96 % - 60   09/28/17 0730 - 95 % 3 l/min -   09/28/17 0918 92/42 93 % 3 l/min 63        Mental Status  Neurologic State: Alert, Appropriate for age  Orientation Level: Oriented X4  Cognition: Appropriate for age attention/concentration, Follows commands  Perception: Appears intact  Perseveration: No perseveration noted  Safety/Judgement: Decreased awareness of need for safety, Fall prevention, Home safety                               Physical Skills Involved:  1. Range of Motion  2. Balance  3. Strength  4. Activity Tolerance  5. Fine Motor Control  6. Gross Motor Control  7. Pain (acute)  8. Skin Integrity Cognitive Skills Affected (resulting in the inability to perform in a timely and safe manner):  1. Executive Function Psychosocial Skills Affected:  1. Habits/Routines  2. Environmental Adaptation   Number of elements that affect the Plan of Care: 5+:  HIGH COMPLEXITY   CLINICAL DECISION MAKIN[address] [place] [address] AM-PAC 6 Clicks   Daily Activity Inpatient Short Form  How much help from another person does the patient currently need. .. Total A Lot A Little None   1. Putting on and taking off regular lower body clothing?   [ ] 1   [X] 2   [ ] 3   [ ] 4   2. Bathing (including washing, rinsing, drying)? [ ] 1   [X] 2   [ ] 3   [ ] 4   3. Toileting, which includes using toilet, bedpan or urinal?   [X] 1   [ ] 2   [ ] 3   [ ] 4   4. Putting on and taking off regular upper body clothing?   [ ] 1   [X] 2   [ ] 3   [ ] 4   5. Taking care of personal grooming such as brushing teeth? [ ] 1   [ ] 2   [X] 3   [ ] 4   6. Eating meals? [ ] 1   [ ] 2   [X] 3   [ ] 4   © , Trustees of [address] [place] [address], under license to [employer]. All rights reserved    Score:  Initial: 13 Most Recent: X (Date: -- )     Interpretation of Tool:  Represents activities that are increasingly more difficult (i.e. Bed mobility, Transfers, Gait).        Score 24 23 22-20 19-15 14-10 9-7 6       Modifier CH CI CJ CK CL CM CN         · Self Care:               - CURRENT STATUS:    CL - 60%-79% impaired, limited or restricted               - GOAL STATUS:           CK - 40%-59% impaired, limited or restricted               - D/C STATUS:                       ---------------To be determined---------------  [place] / [place] / Product Type: Medicare /       Medical Necessity:     · Patient demonstrates excellent rehab potential due to higher previous functional level. Reason for Services/Other Comments:  · Patient continues to require skilled intervention due to decreased independence with ADL. Use of outcome tool(s) and clinical judgement create a POC that gives a: MODERATE COMPLEXITY             TREATMENT:   (In addition to Assessment/Re-Assessment sessions the following treatments were rendered)      Pre-treatment Symptoms/Complaints:    Pain: Initial:   Pain Intensity 1: 8  Pain Location 1: Abdomen  Pain Orientation 1: Right  Pain Intervention(s) 1: Repositioned  Post Session:  same      Assessment/Reassessment only, no treatment provided today     Braces/Orthotics/Lines/Etc:   · cantu catheter  · O2 Device: Nasal cannula  Treatment/Session Assessment:    · Response to Treatment:  Evaluation only. · Interdisciplinary Collaboration:  · Occupational Therapist  · Registered Nurse  · Certified Nursing Assistant/Patient Care Technician  · After treatment position/precautions:  · Supine in bed  · Bed alarm/tab alert on  · Bed/Chair-wheels locked  · Call light within reach  · RN notified  · Compliance with Program/Exercises: Will assess as treatment progresses. · Recommendations/Intent for next treatment session: \"Next visit will focus on advancements to more challenging activities and reduction in assistance provided\".   Total Treatment Duration:  OT Patient Time In/Time Out  Time In: 0952  Time Out: [facility]

## 2017-09-28 NOTE — PROGRESS NOTES
Spoke with SHEELA Goodman about patient's blood pressure 93/42. Per PA, orders received to hold 50 mg  toprol xl.

## 2017-09-28 NOTE — PROGRESS NOTES
Care Management Interventions  PCP Verified by CM: Yes (3 weeks ago )  Transition of Care Consult (CM Consult): Discharge Planning  Current Support Network: Relative's Home  Confirm Follow Up Transport: Family  Plan discussed with Pt/Family/Caregiver: Yes  Freedom of Choice Offered: Yes    Met with patient for discharge planning. Patient alert and oriented x 3 , independent ADL's, lives with his daughter who does the cooking. He has O2 at 3 liters NC at night at home, has 2 walkers at home and able to obtain medications with his prescription drug plan. He wanted me to speak with his daughter Yuliana Hall at [phone]. I called his daughter Ana Ramirez and spoke withe  her about discharge planning. Daughter is concerned that he has been having frequent falls, and weaker than normal recently. States interested in [address] PT vs SNF. Explained would see how he does with PT/OT and their recommendations. If patient needs SNF daughter perfers [facility] or [facility]. PPD has been ordered so will follow with progress of PT/OT.

## 2017-09-28 NOTE — PROGRESS NOTES
Patient resting quietly in bed. Slept at short intervals throughout the shift, no distress noted during hourly bedside checks. No request or needs at present.

## 2017-09-28 NOTE — H&P
[address] Cardiology History & Physical      Date of  Admission: 9/27/2017  6:54 PM     Primary Care Physician: Dr. Nilsa Braswell  Primary Cardiologist: Dr. Beti Castano  Admitting Physician: Dr. Domi Pereira    CC: frequent falls, UTI    HPI:  Saritha Castano is a 80 y.o. male with past medical history of PAF, CAD with remote CABG, chronic systolic heart failure, COPD, HTN, hyperlipidemia, ICD in place and chronic CKD who presented to the ER with complaints of frequent falls. Patient's daughter is present at the bedside and states that since Monday the patient has started falling. He does report some shortness of breath, but states he has been dealing with this for years and has not recognized a huge change. He does admitted to sleeping upright at times. He was seen by his PCP on Monday of this week and diagnosed with an UTI. Per his daughter he was started on Bactrim. Upon arrival to the ER, his O2 sat was recorded as 89% on 3LNC. CXR showed moderate bilateral pleural effusions with pulmonary edema. BNP noted to be 1967. While in the ER, he was treated with 60mg IV lasix with little urine output.       Past Medical History:   Diagnosis Date    Arrhythmia     paroxysmal a fib    Arthritis     knees and hands    Atelectasis 2/5/2015    CAD (coronary artery disease) 2006    CABG with 4 grafts, no Mi, no stents    Carotid stenosis without infarct     Chest trauma     right sided from [facility], remote    CHF (congestive heart failure) ([facility] 9/13/2013    Compensated      Chronic kidney disease     stage lll CKD last creatinine 2.0, today 1.9    Chronic obstructive pulmonary disease (HCC)     Chronic pain     arthritic    Chronic systolic heart failure ([address] 5/30/2014    COPD (chronic obstructive pulmonary disease) ([address] 9/13/2013    Coronary atherosclerosis of native coronary vessel     Elevated hemidiaphragm     right     GERD (gastroesophageal reflux disease) 9/13/2013    Heart failure (HCC)     EF 25-30%    Heart failure, left, with LVEF >40% (HCC)     60%-65% on cath 11/2006    HTN (hypertension) 9/13/2013    Hyperlipidemia 9/13/2013    Hypertension     ICD (implantable cardioverter-defibrillator) in place 5/30/2014    [facility] - May 2014     Liver disease     patient denies    Other chest pain 9/13/2013    Atypical, chest tightness     Pleural effusion, left 12/2006    Prostatic hypertrophy, benign 9/13/2013    Psychiatric disorder     anxiety    Restrictive lung disease     Rheumatic aortic insufficiency     Rhinitis 2/5/2015    S/P CABG (coronary artery bypass graft)       Past Surgical History:   Procedure Laterality Date    CARDIAC SURG PROCEDURE UNLIST  11/2006    cabg x 4    CHEST SURGERY PROCEDURE UNLISTED  03/2017    fluid removed from left lung    HX CATARACT REMOVAL      bilateral    HX PACEMAKER  5/2014    ICD       No Known Allergies   Social History     Social History    Marital status:      Spouse name: N/A    Number of children: N/A    Years of education: N/A     Occupational History    , retired      Social History Main Topics    Smoking status: Former Smoker     Packs/day: 1.00     Years: 30.00     Quit date: 1/1/1989    Smokeless tobacco: Never Used    Alcohol use 1.5 oz/week     3 Cans of beer per week      Comment: occasional    Drug use: No    Sexual activity: Yes     Partners: Female     Other Topics Concern    Not on file     Social History Narrative    He is  and has two children. He is a retired .      Family History   Problem Relation Age of Onset    Hypertension Other     Stroke Other      CVA    Other Other      renal failure        No current facility-administered medications for this encounter. Current Outpatient Prescriptions   Medication Sig    amiodarone (CORDARONE) 200 mg tablet Take 1 Tab by mouth every twelve (12) hours.     albuterol (PROVENTIL VENTOLIN) 2.5 mg /3 mL (0.083 %) nebulizer solution 1 vial via nebulizer qid and prn;  Bill to medicare part B, dx COPD - J44.9  Indications: Chronic Obstructive Pulmonary Disease    apixaban (ELIQUIS) 2.5 mg tablet Take 2.5 mg by mouth two (2) times a day.  melatonin 5 mg cap capsule Take 1 Cap by mouth nightly.  risperiDONE (RISPERDAL) 4 mg tablet Take 4 mg by mouth nightly.  potassium chloride (KLOR-CON) 20 mEq packet Take 20 mEq by mouth two (2) times a day.  furosemide (LASIX) 80 mg tablet Take 1 Tab by mouth two (2) times a day. (Patient taking differently: Take 80 mg by mouth daily.)    lisinopril (PRINIVIL, ZESTRIL) 40 mg tablet TAKE ONE TABLET BY MOUTH ONCE DAILY (Patient taking differently: 40 mg every morning. TAKE ONE TABLET BY MOUTH ONCE DAILY)    simvastatin (ZOCOR) 20 mg tablet Take 1 Tab by mouth nightly.  metoprolol succinate (TOPROL-XL) 50 mg XL tablet Take 1 Tab by mouth every twelve (12) hours. (Patient taking differently: Take 50 mg by mouth daily.)    montelukast (SINGULAIR) 10 mg tablet 1 po q hs (Patient taking differently: 10 mg nightly. 1 po q hs)    diphenhydrAMINE (BENADRYL) 25 mg capsule Take 25 mg by mouth nightly.  ZINC ACETATE PO Take  by mouth. Holding for surgery    OXYGEN-AIR DELIVERY SYSTEMS 3 L by Nasal route nightly.  VITAMIN E, DL,TOCOPHERYL ACET, (VITAMIN E ACETATE) 400 unit cap capsule Take  by mouth daily. Holding for surgery    ascorbic acid (VITAMIN C) 500 mg tablet Take  by mouth.  VIT C/VIT E/LUTEIN/MIN/OMEGA-3 (OCUVITE PO) Take  by mouth. Holding for surgery    albuterol (PROAIR HFA) 90 mcg/actuation inhaler 2 puffs qid prn    budesonide-formoterol (SYMBICORT) 160-4.5 mcg/actuation HFA inhaler 2 puffs bid, rinse mouth after use    Cholecalciferol, Vitamin D3, (VITAMIN D3) 1,000 unit cap Take  by mouth.  aspirin (BABY ASPIRIN) 81 mg chewable tablet Take 81 mg by mouth daily.  omeprazole (PRILOSEC) 20 mg capsule Take 20 mg by mouth nightly.        Review of Systems    Review of Systems Respiratory: Positive for shortness of breath. Cardiovascular: Positive for orthopnea and leg swelling. Genitourinary: Positive for frequency. Musculoskeletal: Positive for falls. Neurological: Positive for weakness. Subjective:     Visit Vitals    /61    Pulse 70    Temp 97.5 °F (36.4 °C)    Resp 22    Ht 5' 7\" (1.702 m)    Wt 65.8 kg (145 lb)    SpO2 94%    BMI 22.71 kg/m2     Physical Exam   Constitutional: He is oriented to person, place, and time and well-developed, well-nourished, and in no distress. Eyes: Pupils are equal, round, and reactive to light. Neck: JVD present. Cardiovascular: Normal rate and regular rhythm. Pulmonary/Chest: Effort normal. He has rales. Diminished bases bilaterally   Abdominal: Soft. Bowel sounds are normal.   Musculoskeletal: Normal range of motion. He exhibits edema. Neurological: He is alert and oriented to person, place, and time. Skin: Skin is warm and dry. Psychiatric: Affect normal.       Cardiographics  Telemetry: normal sinus rhythm  ECG: not done  Echocardiogram: from 3/23/17  * EF 25-30%  * moderate pulmonary hypertension  * moderate to severe aortic regurgitation  * moderate mitral regurgitation  * right ventricular systolic function is reduced    Labs:   Recent Results (from the past 24 hour(s))   CBC WITH AUTOMATED DIFF    Collection Time: 09/27/17  4:07 PM   Result Value Ref Range    WBC 5.6 4.3 - 11.1 K/uL    RBC 3.63 (L) 4.23 - 5.67 M/uL    HGB 11.4 (L) 13.6 - 17.2 g/dL    HCT 34.0 (L) 41.1 - 50.3 %    MCV 93.7 79.6 - 97.8 FL    MCH 31.4 26.1 - 32.9 PG    MCHC 33.5 31.4 - 35.0 g/dL    RDW 14.6 11.9 - 14.6 %    PLATELET 779 (L) 409 - 450 K/uL    MPV 10.1 (L) 10.8 - 14.1 FL    DF AUTOMATED      NEUTROPHILS 83 (H) 43 - 78 %    LYMPHOCYTES 8 (L) 13 - 44 %    MONOCYTES 9 4.0 - 12.0 %    EOSINOPHILS 0 (L) 0.5 - 7.8 %    BASOPHILS 0 0.0 - 2.0 %    IMMATURE GRANULOCYTES 0.2 0.0 - 5.0 %    ABS.  NEUTROPHILS 4.7 1.7 - 8.2 K/UL ABS. LYMPHOCYTES 0.5 0.5 - 4.6 K/UL    ABS. MONOCYTES 0.5 0.1 - 1.3 K/UL    ABS. EOSINOPHILS 0.0 0.0 - 0.8 K/UL    ABS. BASOPHILS 0.0 0.0 - 0.2 K/UL    ABS. IMM. GRANS. 0.0 0.0 - 0.5 K/UL   METABOLIC PANEL, COMPREHENSIVE    Collection Time: 09/27/17  4:07 PM   Result Value Ref Range    Sodium 132 (L) 136 - 145 mmol/L    Potassium 4.8 3.5 - 5.1 mmol/L    Chloride 95 (L) 98 - 107 mmol/L    CO2 30 21 - 32 mmol/L    Anion gap 7 7 - 16 mmol/L    Glucose 102 (H) 65 - 100 mg/dL    BUN 25 (H) 8 - 23 MG/DL    Creatinine 2.36 (H) 0.8 - 1.5 MG/DL    GFR est AA 34 (L) >60 ml/min/1.73m2    GFR est non-AA 28 (L) >60 ml/min/1.73m2    Calcium 8.2 (L) 8.3 - 10.4 MG/DL    Bilirubin, total 0.5 0.2 - 1.1 MG/DL    ALT (SGPT) 27 12 - 65 U/L    AST (SGOT) 19 15 - 37 U/L    Alk. phosphatase 76 50 - 136 U/L    Protein, total 7.1 6.3 - 8.2 g/dL    Albumin 3.1 (L) 3.2 - 4.6 g/dL    Globulin 4.0 (H) 2.3 - 3.5 g/dL    A-G Ratio 0.8 (L) 1.2 - 3.5     BNP    Collection Time: 09/27/17  4:07 PM   Result Value Ref Range    BNP 1967 pg/mL       Patient has been seen and examined by Dr. Carol Garcia and he agrees with the following assessment and plan:     Assessment/Plan:        UTI (urinary tract infection) -- admit to telemetry. Stop bactrim and start IV Rocephin. Check urine culture      Hyperlipidemia -- continue statin therapy. HTN (hypertension) -- controlled. Continue home medications. Holding ACEI with elevated creatinine. Monitor BP closely. Titrate medications as needed. ICD (implantable cardioverter-defibrillator) in place       Overview: [facility] - May 2014      Pulmonary emphysema -- continue home medications. Followed by [facility] as outpatient      Pleural effusion, bilateral -- start IV lasix 40mg BID. Consult pulmonary in the AM for possible thoracentesis      Frequent falls -- possibly related to acute infection. Place PPD on admission, consult PT/OT and social work for possible rehab placement at discharge. Pulmonary edema -- start IV Lasix BID. Chronic systolic heart failure -- EF 25-30% by last echo. Continue Toprol XL. Holding lisinopril due top elevated creatinine. CKD stage 3 -- holding ACEi. Monitor renal function closely while on IV Lasix. PAF -- on amiodarone and Eliquis as outpatient. Hold AM dose of Eliquis on 9/28 for possible thoracentesis.        Do Not Resuscitate     Rickey Lew NP  9/27/2017 10:02 PM

## 2017-09-28 NOTE — PROGRESS NOTES
TRANSFER - IN REPORT:    Verbal report received from Esperanza Razo RN (name) on Sravani Delarosa  being received from ED(unit) for routine progression of care      Report consisted of patients Situation, Background, Assessment and   Recommendations(SBAR). Information from the following report(s) SBAR, ED Summary and Cardiac Rhythm PACED. was reviewed with the receiving nurse. Opportunity for questions and clarification was provided. Assessment completed upon patients arrival to unit and care assumed. Received to [facility], via cart from ED. Admitted to room 304, oriented to room, surroundings and staff, voiced understanding. Patient reports recently had multiple falls d/t weakness. Patient informed, Posey pad bed alarm applied to bed for safety, voiced understanding. Head to toe skin assessment completed. Skin warm, dry and intact. Multiple bruised areas scattered over bilateral forearms, large bruise on rt rib cage, 2 medium size bruised areas on mid thoracic back and another medium size bruise on lt hip.  2 skin tears on rt forearm. Sacrum clear, no break down noted.

## 2017-09-28 NOTE — PROGRESS NOTES
Initial visit by  to convey care and concern and encourage patient that  services are available if desired. Offered prayer during the visit. Provided business card for future reference.      Tray Grimes 68  Board Certified

## 2017-09-28 NOTE — CONSULTS
CONSULT NOTE    Sanjeev Jimenez    9/28/2017    Date of Admission:  9/27/2017    The patient's chart is reviewed and the patient is discussed with the staff. Subjective:     Patient is a 80 y.o.  male seen and evaluated at the request of Dr. Sheila De Jesus for bilateral pleural effusions. He presented to the ER with shortness of breath, productive cough with white sputum and peripheral edema. Was diagnosed with UTI by PCP and placed on Bactrim. Had had recent falls and orthopnea. Chronic medical problems:  PAF and on Eliquis, CAD with remote CABG, chronic s-CHF with ICD, CKD, HTN, HLD. In the ER BNP was 1967 and started on IV Lasix. Was hypoxic on 3L with sat 89%. He has chronic COPD, on home O2 at 3L and followed in our office at [facility] and last seen 9/15/17.       Review of Systems  A comprehensive review of systems was negative except for: Constitutional: positive for fatigue and malaise  Respiratory: positive for cough, sputum or dyspnea on exertion  Cardiovascular: positive for dyspnea, irregular heart beats, fatigue, orthopnea, lower extremity edema, dyspnea on exertion  Gastrointestinal: positive for reflux symptoms  Genitourinary: positive for UTI  Musculoskeletal: positive for muscle weakness    Patient Active Problem List   Diagnosis Code    Hyperlipidemia E78.5    HTN (hypertension) I10    CAD (coronary artery disease) I25.10    COPD (chronic obstructive pulmonary disease) ([facility]) J44.9    GERD (gastroesophageal reflux disease) K21.9    Prostatic hypertrophy, benign N40.0    Chronic systolic heart failure ([facility] [address] I50.22    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    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    Rhinitis J31.0    Nonrheumatic aortic valve insufficiency I35.1    Bilateral carotid artery disease (HCC) I77.9    S/P CABG (coronary artery bypass graft) Z95.1    Coronary atherosclerosis of native coronary vessel I25.10    Pulmonary emphysema (HCC) J43.9    Hypoxia R09.02    Acute on chronic systolic (congestive) heart failure ([facility]) I50.23    CKD (chronic kidney disease) stage 3, GFR 30-59 ml/min N18.3    Pleural effusion, bilateral J90    Acute respiratory failure ([facility]) J96.00    Urinary retention R33.9    Congestive heart failure ([facility]) I50.9    PAF (paroxysmal atrial fibrillation) ([facility]) I48.0    BPH (benign prostatic hyperplasia) N40.0    BPH with obstruction/lower urinary tract symptoms N40.1, N13.8    Non-rheumatic mitral regurgitation I34.0    Frequent falls R29.6    Pulmonary edema J81.1    UTI (urinary tract infection) N39.0    DNR (do not resuscitate) Z66       Home O2 3L. Prior to Admission Medications   Prescriptions Last Dose Informant Patient Reported? Taking? Cholecalciferol, Vitamin D3, (VITAMIN D3) 1,000 unit cap   Yes No   Sig: Take  by mouth. OXYGEN-AIR DELIVERY SYSTEMS   Yes No   Sig: 3 L by Nasal route nightly. VIT C/VIT E/LUTEIN/MIN/OMEGA-3 (OCUVITE PO)   Yes No   Sig: Take  by mouth. Holding for surgery   VITAMIN E, DL,TOCOPHERYL ACET, (VITAMIN E ACETATE) 400 unit cap capsule   Yes No   Sig: Take  by mouth daily. Holding for surgery   ZINC ACETATE PO   Yes No   Sig: Take  by mouth. Holding for surgery   albuterol (PROAIR HFA) 90 mcg/actuation inhaler   No No   Si puffs qid prn   albuterol (PROVENTIL VENTOLIN) 2.5 mg /3 mL (0.083 %) nebulizer solution   No No   Si vial via nebulizer qid and prn;  Bill to medicare part B, dx COPD - J44.9  Indications: Chronic Obstructive Pulmonary Disease   amiodarone (CORDARONE) 200 mg tablet   No No   Sig: Take 1 Tab by mouth every twelve (12) hours. apixaban (ELIQUIS) 2.5 mg tablet   Yes No   Sig: Take 2.5 mg by mouth two (2) times a day. ascorbic acid (VITAMIN C) 500 mg tablet   Yes No   Sig: Take  by mouth. aspirin (BABY ASPIRIN) 81 mg chewable tablet   Yes No   Sig: Take 81 mg by mouth daily.    budesonide-formoterol (SYMBICORT) 160-4.5 mcg/actuation HFA inhaler   No No   Si puffs bid, rinse mouth after use   diphenhydrAMINE (BENADRYL) 25 mg capsule   Yes No   Sig: Take 25 mg by mouth nightly. furosemide (LASIX) 80 mg tablet   No No   Sig: Take 1 Tab by mouth two (2) times a day. Patient taking differently: Take 80 mg by mouth daily. lisinopril (PRINIVIL, ZESTRIL) 40 mg tablet   No No   Sig: TAKE ONE TABLET BY MOUTH ONCE DAILY   Patient taking differently: 40 mg every morning. TAKE ONE TABLET BY MOUTH ONCE DAILY   melatonin 5 mg cap capsule   No No   Sig: Take 1 Cap by mouth nightly. metoprolol succinate (TOPROL-XL) 50 mg XL tablet   No No   Sig: Take 1 Tab by mouth every twelve (12) hours. Patient taking differently: Take 50 mg by mouth daily. montelukast (SINGULAIR) 10 mg tablet   No No   Si po q hs   Patient taking differently: 10 mg nightly. 1 po q hs   omeprazole (PRILOSEC) 20 mg capsule   Yes No   Sig: Take 20 mg by mouth nightly. potassium chloride (KLOR-CON) 20 mEq packet   Yes No   Sig: Take 20 mEq by mouth two (2) times a day. risperiDONE (RISPERDAL) 4 mg tablet   Yes No   Sig: Take 4 mg by mouth nightly. simvastatin (ZOCOR) 20 mg tablet   No No   Sig: Take 1 Tab by mouth nightly.       Facility-Administered Medications: None       Past Medical History:   Diagnosis Date    Arrhythmia     paroxysmal a fib    Arthritis     knees and hands    Atelectasis 2015    CAD (coronary artery disease) 2006    CABG with 4 grafts, no Mi, no stents    Carotid stenosis without infarct     Chest trauma     right sided from [facility], remote    CHF (congestive heart failure) ([facility] [address] 2013    Compensated      Chronic kidney disease     stage lll CKD last creatinine 2.0, today 1.9    Chronic obstructive pulmonary disease (HCC)     Chronic pain     arthritic    Chronic systolic heart failure ([facility] [address] 2014    COPD (chronic obstructive pulmonary disease) ([facility] [address] 2013    Coronary atherosclerosis of native coronary vessel     Elevated hemidiaphragm     right     GERD (gastroesophageal reflux disease) 9/13/2013    Heart failure (HCC)     EF 25-30%    Heart failure, left, with LVEF >40% (HCC)     60%-65% on cath 11/2006    HTN (hypertension) 9/13/2013    Hyperlipidemia 9/13/2013    Hypertension     ICD (implantable cardioverter-defibrillator) in place 5/30/2014    [facility] - May 2014     Liver disease     patient denies    Other chest pain 9/13/2013    Atypical, chest tightness     Pleural effusion, left 12/2006    Prostatic hypertrophy, benign 9/13/2013    Psychiatric disorder     anxiety    Restrictive lung disease     Rheumatic aortic insufficiency     Rhinitis 2/5/2015    S/P CABG (coronary artery bypass graft)      Past Surgical History:   Procedure Laterality Date    CARDIAC SURG PROCEDURE UNLIST  11/2006    cabg x 4    CHEST SURGERY PROCEDURE UNLISTED  03/2017    fluid removed from left lung    HX CATARACT REMOVAL      bilateral    HX PACEMAKER  5/2014    ICD     Social History     Social History    Marital status:      Spouse name: N/A    Number of children: N/A    Years of education: N/A     Occupational History    , retired      Social History Main Topics    Smoking status: Former Smoker     Packs/day: 1.00     Years: 30.00     Quit date: 1/1/1989    Smokeless tobacco: Never Used    Alcohol use 1.5 oz/week     3 Cans of beer per week      Comment: occasional    Drug use: No    Sexual activity: Yes     Partners: Female     Other Topics Concern    Not on file     Social History Narrative    He is  and has two children.  He is a retired .      Family History   Problem Relation Age of Onset    Hypertension Other     Stroke Other      CVA    Other Other      renal failure     No Known Allergies    Current Facility-Administered Medications   Medication Dose Route Frequency    albuterol (PROVENTIL VENTOLIN) nebulizer solution 2.5 mg  2.5 mg Nebulization Q6H RT    amiodarone (CORDARONE) tablet 200 mg  200 mg Oral Q12H    apixaban (ELIQUIS) tablet 2.5 mg  2.5 mg Oral BID    aspirin chewable tablet 81 mg  81 mg Oral DAILY    metoprolol succinate (TOPROL-XL) XL tablet 50 mg  50 mg Oral DAILY    pantoprazole (PROTONIX) tablet 40 mg  40 mg Oral ACB    risperiDONE (RisperDAL) tablet 4 mg  4 mg Oral QHS    simvastatin (ZOCOR) tablet 20 mg  20 mg Oral QHS    potassium chloride (KLOR-CON) packet 20 mEq  20 mEq Oral BID    furosemide (LASIX) injection 40 mg  40 mg IntraVENous BID    cefTRIAXone (ROCEPHIN) 1 g in 0.9% sodium chloride (MBP/ADV) 50 mL  1 g IntraVENous Q24H    tuberculin injection 5 Units  5 Units IntraDERMal ONCE    influenza vaccine 2017-18 (3 yrs+)(PF) (FLUZONE QUAD/FLUARIX QUAD) injection 0.5 mL  0.5 mL IntraMUSCular PRIOR TO DISCHARGE    budesonide (PULMICORT) 500 mcg/2 ml nebulizer suspension  500 mcg Nebulization BID RT         Objective:     Vitals:    09/28/17 0730 09/28/17 0918 09/28/17 1406 09/28/17 1427   BP:  92/42  119/54   Pulse:  63  69   Resp:  20  20   Temp:  99.4 °F (37.4 °C)  97.4 °F (36.3 °C)   SpO2: 95% 93% 94%    Weight:       Height:           PHYSICAL EXAM     Constitutional:  the patient is well developed and in no acute distress, NC 3L, sat 94%  EENMT:  Sclera clear, pupils equal, oral mucosa moist  Respiratory: diminished in bases, few anterior crackles, no wheezing  Cardiovascular:  RRR without M,G,R  Gastrointestinal: soft and non-tender; with positive bowel sounds. Musculoskeletal: warm without cyanosis. There is trace lower leg edema.   Skin:  no jaundice or rashes, no wounds   Neurologic: no gross neuro deficits     Psychiatric:  alert and oriented x 3    CXR 9/27/17:        Recent Labs      09/28/17   0534  09/27/17   1607   WBC  5.3  5.6   HGB  10.5*  11.4*   HCT  31.7*  34.0*   PLT  133*  137*     Recent Labs      09/28/17   0534  09/27/17   1607   NA  135*  132*   K  4.2  4.8   CL  96*  95*   GLU  73  102*   CO2  32  30 BUN  25*  25*   CREA  2.26*  2.36*   MG  2.2   --    CA  7.8*  8.2*   ALB   --   3.1*   TBILI   --   0.5   ALT   --   27   SGOT   --   19     No results for input(s): PH, PCO2, PO2, HCO3 in the last 72 hours. No results for input(s): LCAD, LAC in the last 72 hours. Assessment:  (Medical Decision Making)     Hospital Problems  Date Reviewed: 9/28/2017          Codes Class Noted POA    DNR (do not resuscitate) ICD-10-CM: Z66  ICD-9-CM: V49.86  9/28/2017 Yes    unchanged    Frequent falls ICD-10-CM: R29.6  ICD-9-CM: V15.88  9/27/2017 Unknown    unchanged    Pulmonary edema ICD-10-CM: J81.1  ICD-9-CM: 425  9/27/2017 Yes    diuresing    * (Principal)UTI (urinary tract infection) ICD-10-CM: N39.0  ICD-9-CM: 599.0  9/27/2017 Yes    Recently started on Bactrim--currently on Rocephin    PAF (paroxysmal atrial fibrillation) (HCC) (Chronic) ICD-10-CM: I48.0  ICD-9-CM: 427.31  6/8/2017 Yes    controlled    CKD (chronic kidney disease) stage 3, GFR 30-59 ml/min (Chronic) ICD-10-CM: N18.3  ICD-9-CM: 585.3  12/26/2016 Yes    chronic    Pleural effusion, bilateral ICD-10-CM: J90  ICD-9-CM: 511.9  12/26/2016 Yes    Stop Eliquis for possible tap tomorrow    Pulmonary emphysema ([facility] [address] ICD-10-CM: J43.9  ICD-9-CM: 492.8  11/2/2016 Yes    chronic    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 - May 2014         chronic    Hyperlipidemia (Chronic) ICD-10-CM: E78.5  ICD-9-CM: 272.4  9/13/2013 Yes    chronic    HTN (hypertension) (Chronic) ICD-10-CM: I10  ICD-9-CM: 401.9  9/13/2013 Yes    chronic          Plan:  (Medical Decision Making)     --Eliquis--stop  --check with US tomorrow for possible tap  --Albuterol, Pulmicort  --Lasix 40mg IV BID    More than 50% of the time documented was spent in face-to-face contact with the patient and in the care of the patient on the floor/unit where the patient is located.     Thank you very much for this referral. We appreciate the opportunity to participate in this patient's care. Will follow along with above stated plan.     Gege Drake NP

## 2017-09-28 NOTE — ED PROVIDER NOTES
Patient is a 80 y.o. male presenting with cough. The history is provided by the patient and a relative. Cough   This is a new problem. The problem occurs constantly. The problem has not changed since onset. The cough is non-productive. There has been no fever. Associated symptoms comments: weakness. He has tried nothing for the symptoms. He is not a smoker. His past medical history is significant for CHF.         Past Medical History:   Diagnosis Date    Arrhythmia     paroxysmal a fib    Arthritis     knees and hands    Atelectasis 2/5/2015    CAD (coronary artery disease) 2006    CABG with 4 grafts, no Mi, no stents    Carotid stenosis without infarct     Chest trauma     right sided from [facility], remote    CHF (congestive heart failure) ([facility] [address] 9/13/2013    Compensated      Chronic kidney disease     stage lll CKD last creatinine 2.0, today 1.9    Chronic obstructive pulmonary disease (HCC)     Chronic pain     arthritic    Chronic systolic heart failure ([address] 5/30/2014    COPD (chronic obstructive pulmonary disease) ([facility] [address] 9/13/2013    Coronary atherosclerosis of native coronary vessel     Elevated hemidiaphragm     right     GERD (gastroesophageal reflux disease) 9/13/2013    Heart failure ([address]     EF 25-30%    Heart failure, left, with LVEF >40% (HCC)     60%-65% on cath 11/2006    HTN (hypertension) 9/13/2013    Hyperlipidemia 9/13/2013    Hypertension     ICD (implantable cardioverter-defibrillator) in place 5/30/2014    [facility] - May 2014     Liver disease     patient denies    Other chest pain 9/13/2013    Atypical, chest tightness     Pleural effusion, left 12/2006    Prostatic hypertrophy, benign 9/13/2013    Psychiatric disorder     anxiety    Restrictive lung disease     Rheumatic aortic insufficiency     Rhinitis 2/5/2015    S/P CABG (coronary artery bypass graft)        Past Surgical History:   Procedure Laterality Date    CARDIAC SURG PROCEDURE UNLIST  11/2006    cabg x 4  CHEST SURGERY PROCEDURE UNLISTED  03/2017    fluid removed from left lung    HX CATARACT REMOVAL      bilateral    HX PACEMAKER  5/2014    ICD         Family History:   Problem Relation Age of Onset    Hypertension Other     Stroke Other      CVA    Other Other      renal failure       Social History     Social History    Marital status:      Spouse name: N/A    Number of children: N/A    Years of education: N/A     Occupational History    , retired      Social History Main Topics    Smoking status: Former Smoker     Packs/day: 1.00     Years: 30.00     Quit date: 1/1/1989    Smokeless tobacco: Never Used    Alcohol use 1.5 oz/week     3 Cans of beer per week      Comment: occasional    Drug use: No    Sexual activity: Yes     Partners: Female     Other Topics Concern    Not on file     Social History Narrative    He is  and has two children. He is a retired .          ALLERGIES: Review of patient's allergies indicates no known allergies. Review of Systems   Constitutional: Negative. HENT: Negative. Respiratory: Negative. Cardiovascular: Negative. Gastrointestinal: Negative. Endocrine: Negative. Genitourinary: Negative. Musculoskeletal: Negative. Skin: Negative. Neurological: Negative. Hematological: Negative. Vitals:    09/27/17 1601 09/27/17 1949 09/27/17 2114 09/27/17 2227   BP: 127/65 129/60 124/61 129/62   Pulse: 70 80 70 71   Resp: 18  22 20   Temp: 97.4 °F (36.3 °C)  97.5 °F (36.4 °C) 98.2 °F (36.8 °C)   SpO2: (!) 89%  94% 97%   Weight: 65.8 kg (145 lb)   68.5 kg (151 lb)   Height: 5' 7\" (1.702 m)   5' 6\" (1.676 m)            Physical Exam   Constitutional: He is oriented to person, place, and time. He appears well-developed and well-nourished. HENT:   Head: Normocephalic and atraumatic. Cardiovascular: Intact distal pulses. Pulmonary/Chest: Effort normal. No respiratory distress.  He has decreased breath sounds in the right lower field and the left lower field. Abdominal: Soft. He exhibits no distension. There is no tenderness. Musculoskeletal: He exhibits no edema. Neurological: He is alert and oriented to person, place, and time. Skin: Skin is warm and dry. Psychiatric: He has a normal mood and affect. His behavior is normal.   Nursing note and vitals reviewed. MDM  Number of Diagnoses or Management Options  Accidental fall, initial encounter: new and requires workup  Acute on chronic systolic congestive heart failure ([facility] [address]): new and requires workup  Chronic kidney disease (CKD), unspecified stage: new and requires workup  Pleural effusion: new and requires workup  Diagnosis management comments: Multiple co-morbidities. Currently on antibiotic for UTI (Bactrim) and suggested stopping due to CKD and using less nephrotoxic medication until cultures available. Patient feels more weak and SOB which appears related to his CHF exacerbation, but understandably has many medical issues all of which are likely additive to his symptoms. BNP elevated from baseline. New, bilateral pleural effusion as compared to previous CXR. Suspect decompensated CHF with last EF 30-35%. Stable CKD. Family at bedside said very similar symptom \"package\" with CHF in past and usually bounced back with diuresis although did have diagnostic thoracentesis last time (300ml) and still thought CHF related. No evidence of failure of antibiotic therapy, but still needs to change therapy due to nephrotoxic risks of Bactrim. Spoke with Cardiology, Dr. Clay Hanna, and said would gladly evaluate at bedside.        Amount and/or Complexity of Data Reviewed  Clinical lab tests: ordered and reviewed (Results for orders placed or performed during the hospital encounter of 09/27/17  -CBC WITH AUTOMATED DIFF       Result                                            Value                         Ref Range                       WBC 5.6                           4.3 - 11.1 K/uL                 RBC                                               3.63 (L)                      4.23 - 5.67 M/uL                HGB                                               11.4 (L)                      13.6 - 17.2 g/dL                HCT                                               34.0 (L)                      41.1 - 50.3 %                   MCV                                               93.7                          79.6 - 97.8 FL                  MCH                                               31.4                          26.1 - 32.9 PG                  MCHC                                              33.5                          31.4 - 35.0 g/dL                RDW                                               14.6                          11.9 - 14.6 %                   PLATELET                                          137 (L)                       150 - 450 K/uL                  MPV                                               10.1 (L)                      10.8 - 14.1 FL                  DF                                                AUTOMATED                                                     NEUTROPHILS                                       83 (H)                        43 - 78 %                       LYMPHOCYTES                                       8 (L)                         13 - 44 %                       MONOCYTES                                         9                             4.0 - 12.0 %                    EOSINOPHILS                                       0 (L)                         0.5 - 7.8 %                     BASOPHILS                                         0                             0.0 - 2.0 %                     IMMATURE GRANULOCYTES                             0.2                           0.0 - 5.0 %                     ABS.  NEUTROPHILS                                  4.7 1.7 - 8.2 K/UL                  ABS. LYMPHOCYTES                                  0.5                           0.5 - 4.6 K/UL                  ABS. MONOCYTES                                    0.5                           0.1 - 1.3 K/UL                  ABS. EOSINOPHILS                                  0.0                           0.0 - 0.8 K/UL                  ABS. BASOPHILS                                    0.0                           0.0 - 0.2 K/UL                  ABS. IMM.  GRANS.                                  0.0                           0.0 - 0.5 K/UL             -METABOLIC PANEL, COMPREHENSIVE       Result                                            Value                         Ref Range                       Sodium                                            132 (L)                       136 - 145 mmol/L                Potassium                                         4.8                           3.5 - 5.1 mmol/L                Chloride                                          95 (L)                        98 - 107 mmol/L                 CO2                                               30                            21 - 32 mmol/L                  Anion gap                                         7                             7 - 16 mmol/L                   Glucose                                           102 (H)                       65 - 100 mg/dL                  BUN                                               25 (H)                        8 - 23 MG/DL                    Creatinine                                        2.36 (H)                      0.8 - 1.5 MG/DL                 GFR est AA                                        34 (L)                        >60 ml/min/1.73m2               GFR est non-AA                                    28 (L)                        >60 ml/min/1.73m2               Calcium                                           8.2 (L) 8.3 - 10.4 MG/DL                Bilirubin, total                                  0.5                           0.2 - 1.1 MG/DL                 ALT (SGPT)                                        27                            12 - 65 U/L                     AST (SGOT)                                        19                            15 - 37 U/L                     Alk. phosphatase                                  76                            50 - 136 U/L                    Protein, total                                    7.1                           6.3 - 8.2 g/dL                  Albumin                                           3.1 (L)                       3.2 - 4.6 g/dL                  Globulin                                          4.0 (H)                       2.3 - 3.5 g/dL                  A-G Ratio                                         0.8 (L)                       1.2 - 3.5                  -BNP       Result                                            Value                         Ref Range                       BNP                                               1967                          pg/mL                      )  Tests in the radiology section of CPT®: ordered and reviewed (Xr Chest Pa Lat    Result Date: 9/27/2017  Frontal and lateral views of the chest Comparison: 7/28/2017 Indication: Cough, shortness of breath FINDINGS: Cardiac enlargement, median sternotomy wires, dual-lead left chest AICD stable in appearance. Progressive bilateral pleural effusions which are now moderate in size. Pulmonary congestion. No pneumothorax. No discrete acute osseous lesion seen.      IMPRESSION: Pulmonary edema.     )      ED Course       Procedures

## 2017-09-29 PROBLEM — J43.9 PULMONARY EMPHYSEMA (HCC): Chronic | Status: ACTIVE | Noted: 2017-01-01

## 2017-09-29 NOTE — PROGRESS NOTES
Patient has referral to cardiac rehab for diagnosis of heart failure. EF is 25-30%. HF is chronic. Discussed benefits of CR program with patient. He states he would like to participate in CR after discharge if he is able. We will contact the patient after discharge to attempt to schedule an orientation appointment.

## 2017-09-29 NOTE — PROGRESS NOTES
9/29/2017 8:14 AM    Admit Date: 9/27/2017    Admit Diagnosis: Frequent falls      Subjective:   No cp- breathing better      Objective:      Visit Vitals    /53    Pulse 60    Temp 98 °F (36.7 °C)    Resp 20    Ht 5' 6\" (1.676 m)    Wt 66.4 kg (146 lb 4.8 oz)    SpO2 98%    BMI 23.61 kg/m2       Physical Exam:  Gopal Heady, Well Nourished, No Acute Distress, Alert & Oriented x 3, appropriate mood. Neck- supple, no JVD  CV- regular rate and rhythm no MRG  Lung- clear bilaterally  Abd- soft, nontender, nondistended  Ext- no edema bilaterally. Skin- warm and dry        Data Review:   Recent Labs      09/29/17   0335  09/28/17   0534   NA  132*  135*   K  4.6  4.2   BUN  28*  25*   CREA  2.62*  2.26*   WBC   --   5.3   HGB   --   10.5*   HCT   --   31.7*   PLT   --   133*       Assessment/Plan:     Principal Problem:    UTI (urinary tract infection) (9/27/2017)    Active Problems:    Hyperlipidemia (9/13/2013)Stable. Continue current medical therapy. Acute systolic chf- Improved with current therapy. Will continue medications  Cr increased -change to po lasix- monitor cr- no ace/arb due to ARF      HTN (hypertension) (9/13/2013)Stable. Continue current medical therapy. 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 - May 2014      Pulmonary emphysema ([facility] [address] (11/2/2016)      CKD (chronic kidney disease) stage 3, GFR 30-59 ml/min (12/26/2016)      Pleural effusion, bilateral (12/26/2016) pulm to eval for tap      PAF (paroxysmal atrial fibrillation) ([facility] [address] (6/8/2017)Stable. Continue current medical therapy.   No anti-coagulation due to falls      Frequent falls (9/27/2017)      Pulmonary edema (9/27/2017)      DNR (do not resuscitate) (9/28/2017)

## 2017-09-29 NOTE — PROGRESS NOTES
Problem: Self Care Deficits Care Plan (Adult)  Goal: *Acute Goals and Plan of Care (Insert Text)  1. Patient will complete upper body bathing and dressing with set-up and adaptive equipment as needed. 2. Patient will complete toileting with supervision. 3. Patient will tolerate 25 minutes of OT treatment with minimal rest breaks to increase activity tolerance for ADLs. 4. Patient will complete functional transfers with supevision and adaptive equipment as needed. 5. Patient will complete functional mobility for household distances with supervision and minimal cues for safety awareness. 6. Patient will complete ADL tasks for 15 minutes with supervision for dynamic standing balance to demonstrate decreased risk for falls. Timeframe: 7 visits       OCCUPATIONAL THERAPY: Initial Assessment 9/29/2017  INPATIENT: Hospital Day: 3  [place] / [place] / Product Type: Medicare /      NAME/AGE/GENDER: Heydi Lawrence is a 80 y.o. male     PRIMARY DIAGNOSIS:  Frequent falls UTI (urinary tract infection) UTI (urinary tract infection)        ICD-10: Treatment Diagnosis:        · Generalized Muscle Weakness (M62.81)  · Other lack of cordination (R27.8)  · Repeated Falls (R29.6)   Precautions/Allergies:         Review of patient's allergies indicates no known allergies. ASSESSMENT:      Mr. Qi Christopher presents to the hospital with UTI and frequent falls. Pt sitting on BSC upon arrival with nursing assistance at bedside. Pt was alert and oriented. Pt able to answer questions appropriately throughout evaluation. Pt leans to the L due to pain along his R side from recent fall. Pt reports he has had 3 recent falls. Pt needing minimal assistance to stand from [facility] with additional time. Pt noted to have some decreased standing balance, losing his balance while nursing assistant assisting with hygiene.  Pt needs assistance with turning with the use of the walker with impaired balance and overall decreased activity tolerance. Pt was needing assistance with ADL prior to admission as well. Pt has multiple bruises on his body from falls. Pt is currently functioning below baseline for ADL/functional transfers and will benefit from OT services to address stated goals and plan of care. This section established at most recent assessment   PROBLEM LIST (Impairments causing functional limitations):  1. Decreased Strength  2. Decreased ADL/Functional Activities  3. Decreased Transfer Abilities  4. Decreased Ambulation Ability/Technique  5. Decreased Balance  6. Increased Pain  7. Decreased Activity Tolerance  8. Decreased Flexibility/Joint Mobility  9. Decreased Skin Integrity/Hygeine  10. Decreased [place] with Home Exercise Program  11. Decreased Cognition    INTERVENTIONS PLANNED: (Benefits and precautions of occupational therapy have been discussed with the patient.)  1. Activities of daily living training  2. Adaptive equipment training  3. Balance training  4. Clothing management  5. Cognitive training  6. Donning&doffing training  7. Group therapy  8. Neuromuscular re-eduation  9. Therapeutic activity  10. Therapeutic exercise      TREATMENT PLAN: Frequency/Duration: Follow patient 3 times to address above goals. Rehabilitation Potential For Stated Goals: GOOD      RECOMMENDED REHABILITATION/EQUIPMENT: (at time of discharge pending progress): Due to the probability of continued deficits (see above) this patient will likely need continued skilled occupational therapy after discharge. Equipment:   · TBD                      OCCUPATIONAL PROFILE AND HISTORY:   History of Present Injury/Illness (Reason for Referral):  See H&P  Past Medical History/Comorbidities:   Mr. Cipriano Brice  has a past medical history of Arrhythmia; Arthritis; Atelectasis (2/5/2015); CAD (coronary artery disease) (2006); Carotid stenosis without infarct; Chest trauma; [facility] (9/13/2013);  Chronic kidney disease; Chronic obstructive pulmonary disease ([facility] Chronic pain; Chronic systolic heart failure (HCC) (5/30/2014); COPD (chronic obstructive pulmonary disease) ([facility] (9/13/2013); Coronary atherosclerosis of native coronary vessel; Elevated hemidiaphragm; GERD (gastroesophageal reflux disease) (9/13/2013); Heart failure ([facility] Heart failure, left, 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 Mr. Ramses Zhang  has a past surgical history that includes cataract removal; chest surgery procedure unlisted (03/2017); pacemaker (5/2014); and cardiac surg procedure unlist (11/2006). Social History/Living Environment:   Home Environment: Private residence  # Steps to Enter: 3  Rails to Enter: No  One/Two Story Residence: One story  Living Alone: No  Support Systems: Family member(s)  Patient Expects to be Discharged to[de-identified] Private residence  Current DME Used/Available at Home: Walker, rollator, Oxygen, portable, Shower chair  Tub or Shower Type: Tub/Shower combination  Prior Level of Function/Work/Activity:  Pt lives with his daughter and son-in-law. Pt has been using a rollator with functional mobility with frequent falls. Pt was needing assistance for bathing/dressing from son-in-law. Pt reports he was getting to the bathroom on his own.    Personal Factors:          Social Background:  Frequent falls at home        Past/Current Experience:  Pt has been requiring assistance with ADLs        Other factors that influence how disability is experienced by the patient:  Multiple co-morbidities    Number of Personal Factors/Comorbidities that affect the Plan of Care: Extensive review of physical, cognitive, and psychosocial performance (3+):  HIGH COMPLEXITY   ASSESSMENT OF OCCUPATIONAL PERFORMANCE[de-identified]   Activities of Daily Living:           Basic ADLs (From Assessment) Complex ADLs (From Assessment)   Basic ADL  Feeding: Stand-by assistance  Oral Facial Hygiene/Grooming: Minimum assistance  Bathing: Moderate assistance  Upper Body Dressing: Moderate assistance  Lower Body Dressing: Maximum assistance  Toileting: Total assistance Instrumental ADL  Meal Preparation: Total assistance  Homemaking:  Total assistance   Grooming/Bathing/Dressing Activities of Daily Living     Cognitive Retraining  Safety/Judgement: Awareness of environment                     Lower Body Dressing Assistance  Socks: Stand-by assistance (adjusting socks)  Position Performed: Seated edge of bed  Cues: Don Bed/Mat Mobility  Supine to Sit: Minimum assistance  Sit to Supine: Minimum assistance  Sit to Stand: Contact guard assistance  Scooting: Minimum assistance  Cues: Verbal cues provided;Physical assistance          Most Recent Physical Functioning:   Gross Assessment:                  Posture:     Balance:  Sitting: Impaired  Sitting - Static: Good (unsupported)  Sitting - Dynamic: Fair (occasional)  Standing: Impaired  Standing - Static: Poor;Constant support  Standing - Dynamic : Poor Bed Mobility:  Supine to Sit: Minimum assistance  Sit to Supine: Minimum assistance  Scooting: Minimum assistance  Duration: 20 Minutes  Wheelchair Mobility:     Transfers:  Sit to Stand: Contact guard assistance  Stand to Sit: Minimum assistance              Patient Vitals for the past 6 hrs:   BP BP Patient Position SpO2 O2 Flow Rate (L/min) Pulse   09/29/17 1307 112/44 Sitting 98 % 3 l/min 70   09/29/17 1346 - - 96 % 3 l/min -        Mental Status  Neurologic State: Alert  Orientation Level: Oriented to person, Oriented to place  Cognition: Follows commands  Perception: Appears intact  Perseveration: No perseveration noted  Safety/Judgement: Awareness of environment                               Physical Skills Involved:  1. Range of Motion  2. Balance  3. Strength  4. Activity Tolerance  5. Fine Motor Control  6. Gross Motor Control  7. Pain (acute)  8. Skin Integrity Cognitive Skills Affected (resulting in the inability to perform in a timely and safe manner):  1. Executive Function Psychosocial Skills Affected:  1. Habits/Routines  2. Environmental Adaptation   Number of elements that affect the Plan of Care: 5+:  HIGH COMPLEXITY   CLINICAL DECISION MAKIN[address] [place] [address] AM-PAC 6 Clicks   Daily Activity Inpatient Short Form  How much help from another person does the patient currently need. .. Total A Lot A Little None   1. Putting on and taking off regular lower body clothing?   [ ] 1   [X] 2   [ ] 3   [ ] 4   2. Bathing (including washing, rinsing, drying)? [ ] 1   [X] 2   [ ] 3   [ ] 4   3. Toileting, which includes using toilet, bedpan or urinal?   [X] 1   [ ] 2   [ ] 3   [ ] 4   4. Putting on and taking off regular upper body clothing?   [ ] 1   [X] 2   [ ] 3   [ ] 4   5. Taking care of personal grooming such as brushing teeth? [ ] 1   [ ] 2   [X] 3   [ ] 4   6. Eating meals? [ ] 1   [ ] 2   [X] 3   [ ] 4   © , Trustees of [address] [place] [address], under license to [employer]. All rights reserved    Score:  Initial: 13 Most Recent: X (Date: -- )     Interpretation of Tool:  Represents activities that are increasingly more difficult (i.e. Bed mobility, Transfers, Gait).        Score 24 23 22-20 19-15 14-10 9-7 6       Modifier CH CI CJ CK CL CM CN         · Self Care:               - CURRENT STATUS:    CL - 60%-79% impaired, limited or restricted               - GOAL STATUS:           CK - 40%-59% impaired, limited or restricted               - D/C STATUS:                       ---------------To be determined---------------  [place] / [place] / Product Type: Medicare /       Medical Necessity:     · Patient demonstrates excellent rehab potential due to higher previous functional level. Reason for Services/Other Comments:  · Patient continues to require skilled intervention due to decreased independence with ADL. Use of outcome tool(s) and clinical judgement create a POC that gives a: MODERATE COMPLEXITY             TREATMENT:   (In addition to Assessment/Re-Assessment sessions the following treatments were rendered)      Pre-treatment Symptoms/Complaints:    Pain: Initial:   Pain Intensity 1:  (no complaints of pain)  Post Session:  same      Therapeutic Activity: (15 minutes ):  Therapeutic activities including Bed transfers and Chair transfers to improve mobility, strength, balance and coordination. Required moderate Safety awareness training;Verbal cues to promote static and dynamic balance in standing. Therapeutic Exercise: (  11 minutes):  Exercises per grid below to improve mobility, strength, balance and coordination. Required moderate verbal and tactile cues to promote proper body alignment, promote proper body posture, promote proper body mechanics and promote proper body breathing techniques. Progressed resistance, range, repetitions and complexity of movement as indicated. Date:  9/29 Date:   Date:     Activity/Exercise Parameters Parameters Parameters   Sit to stand/stand to sit 5 reps     UE elevation in standing  5-10 reps     Chair pushups 10 reps     Gripping 10 reps                         Braces/Orthotics/Lines/Etc:   · cantu catheter  · O2 Device: Nasal cannula  Treatment/Session Assessment:    · Response to Treatment:  Evaluation only. · Interdisciplinary Collaboration:Occupational Therapist and Registered Nurse  · After treatment position/precautions:  · Up in chair, Bed alarm/tab alert on, Bed/Chair-wheels locked, Call light within reach and RN notified  · Compliance with Program/Exercises:  Will assess as treatment progresses. · Recommendations/Intent for next treatment session: \"Next visit will focus on advancements to more challenging activities and reduction in assistance provided\".   Total Treatment Duration:  OT Patient Time In/Time Out  Time In: 5071  Time Out: DEBBIE Santamaria, OTR/L

## 2017-09-29 NOTE — PROGRESS NOTES
Bedside and Verbal shift change report given to Eboni Malone RN (oncoming nurse) by self (offgoing nurse). Report included the following information SBAR, Kardex, MAR and Recent Results.

## 2017-09-29 NOTE — PROGRESS NOTES
Problem: Mobility Impaired (Adult and Pediatric)  Goal: *Acute Goals and Plan of Care (Insert Text)  STG:  (1.)Mr. Laura Mckeon will move from supine to sit and sit to supine , scoot up and down and roll side to side with STAND BY ASSIST within 3 day(s). (2.)Mr. Laura Mckeon will transfer from bed to chair and chair to bed with STAND BY ASSIST using the least restrictive device within 3 day(s). (3.)Mr. Laura Mckeon will ambulate with CONTACT GUARD ASSIST for 50 feet with the least restrictive device within 3 day(s). LTG:  (1.)Mr. Laura Mckeon will move from supine to sit and sit to supine , scoot up and down and roll side to side in bed with INDEPENDENT within 7 day(s). (2.)Mr. Laura Mckeon will transfer from bed to chair and chair to bed with MODIFIED INDEPENDENCE using the least restrictive device within 7 day(s). (3.)Mr. Laura Mckeon will ambulate with STAND BY ASSIST for 200+ feet with the least restrictive device within 7 day(s). ________________________________________________________________________________________________      PHYSICAL THERAPY: INITIAL ASSESSMENT, TREATMENT DAY: DAY OF ASSESSMENT, AM 9/29/2017  INPATIENT: Hospital Day: 3  [place] / Product Type: Medicare /      NAME/AGE/GENDER: Sravani Delarosa is a 80 y.o. male     PRIMARY DIAGNOSIS: Frequent falls UTI (urinary tract infection) UTI (urinary tract infection)        ICD-10: Treatment Diagnosis:       · Generalized Muscle Weakness (M62.81)  · Difficulty in walking, Not elsewhere classified (R26.2)  · History of falling (Z91.81)   Precaution/Allergies:  Review of patient's allergies indicates no known allergies. ASSESSMENT:      Mr. Laura Mckeon is sleeping in bed on 3L O2. Transitioned to sit with min assist.  Stood to RW with min assist.and ambulated 125'x2 with min assist.  SpO2 remained >90%, however, HR decreased with activity. Patient's standing balance poor and he scissors at times while turning corners.   Patient uses poor technique with sit to stand-very unsafe! Practiced 5x with cueing for hand and walker placement. Performed LE exercises in sitting. Pt returned to sitting EOB and assisted supine into bed requiring Rand. Pt left supine with all needs met and within reach. Patient demonstrated progress with activity tolerance today, however, remains at increased risk of falls due to poor standing balance and decreased safety awareness. Dora Clemens will benefit from skilled PT (medically necessary) to address decreased strength, decreased balance, decreased functional tolerance, decreased cardiopulmonary endurance affecting participation in basic ADLs and functional tasks. This section established at most recent assessment   PROBLEM LIST (Impairments causing functional limitations):  1. Decreased Strength  2. Decreased ADL/Functional Activities  3. Decreased Transfer Abilities  4. Decreased Ambulation Ability/Technique  5. Decreased Balance  6. Increased Pain  7. Decreased Activity Tolerance  8. Increased Fatigue  9. Increased Shortness of Breath  10. Decreased [place] with Home Exercise Program    INTERVENTIONS PLANNED: (Benefits and precautions of physical therapy have been discussed with the patient.)  1. Balance Exercise  2. Bed Mobility  3. Family Education  4. Gait Training  5. Home Exercise Program (HEP)  6. Neuromuscular Re-education/Strengthening  7. Range of Motion (ROM)  8. Therapeutic Activites  9. Therapeutic Exercise/Strengthening  10. Transfer Training  11. Group Therapy      TREATMENT PLAN: Frequency/Duration: 3 times a week for duration of hospital stay  Rehabilitation Potential For Stated Goals: [facility] REHABILITATION/EQUIPMENT: (at time of discharge pending progress): Due to the probability of continued deficits (see above) this patient will likely need continued skilled physical therapy after discharge.   Equipment:   · None at this time                   HISTORY: History of Present Injury/Illness (Reason for Referral):  See H&P below  Veronica Fierro is a 80 y.o. male with past medical history of PAF, CAD with remote CABG, chronic systolic heart failure, COPD, HTN, hyperlipidemia, ICD in place and chronic CKD who presented to the ER with complaints of frequent falls. Patient's daughter is present at the bedside and states that since Monday the patient has started falling. He does report some shortness of breath, but states he has been dealing with this for years and has not recognized a huge change. He does admitted to sleeping upright at times. He was seen by his PCP on Monday of this week and diagnosed with an UTI. Per his daughter he was started on Bactrim. Upon arrival to the ER, his O2 sat was recorded as 89% on 3LNC. CXR showed moderate bilateral pleural effusions with pulmonary edema. BNP noted to be 1967. While in the ER, he was treated with 60mg IV lasix with little urine output. Past Medical History/Comorbidities:   Mr. Guzman Cousin  has a past medical history of Arrhythmia; Arthritis; Atelectasis (2/5/2015); CAD (coronary artery disease) (2006); Carotid stenosis without infarct; Chest trauma; CHF (congestive heart failure) ([address] (9/13/2013); Chronic kidney disease; Chronic obstructive pulmonary disease ([address] Chronic pain; Chronic systolic heart failure (HCC) (5/30/2014); COPD (chronic obstructive pulmonary disease) ([address] (9/13/2013); Coronary atherosclerosis of native coronary vessel; Elevated hemidiaphragm; GERD (gastroesophageal reflux disease) (9/13/2013); Heart failure ([address] Heart failure, left, with LVEF >40% ([address] HTN (hypertension) (9/13/2013); Hyperlipidemia (9/13/2013); Hypertension; ICD (implantable cardioverter-defibrillator) in place (5/30/2014); Liver disease; Other chest pain (9/13/2013); Pleural effusion, left (12/2006); Prostatic hypertrophy, benign (9/13/2013); Psychiatric disorder; Restrictive lung disease;  Rheumatic aortic insufficiency; Rhinitis (2/5/2015); and S/P CABG (coronary artery bypass graft). Mr. Rebecca Colón  has a past surgical history that includes cataract removal; chest surgery procedure unlisted (03/2017); pacemaker (5/2014); and cardiac surg procedure unlist (11/2006). Social History/Living Environment:   Home Environment: Private residence  # Steps to Enter: 3  Rails to Enter: No  One/Two Story Residence: One story  Living Alone: No  Support Systems: Family member(s)  Patient Expects to be Discharged to[de-identified] Private residence  Current DME Used/Available at Home: Cl Tomasz, rollator, Oxygen, portable, Shower chair  Tub or Shower Type: Tub/Shower combination  Prior Level of Function/Work/Activity:  Lives with daughter and her family, ambulates with use of rollator for household distances, requires assist with bathing and dressing, 3 falls   Number of Personal Factors/Comorbidities that affect the Plan of Care: 3+: HIGH COMPLEXITY   EXAMINATION:   Most Recent Physical Functioning:   Gross Assessment:                  Posture:     Balance:  Sitting: Impaired  Sitting - Static: Good (unsupported)  Sitting - Dynamic: Fair (occasional)  Standing: Impaired  Standing - Static: Poor  Standing - Dynamic : Poor Bed Mobility:  Supine to Sit: Minimum assistance  Sit to Supine: Minimum assistance  Scooting: Moderate assistance (in short sitting)  Duration: 20 Minutes  Wheelchair Mobility:     Transfers:  Sit to Stand: Minimum assistance  Stand to Sit: Minimum assistance  Gait:     Base of Support: Center of gravity altered  Speed/Lyla: Slow  Step Length: Right shortened;Left shortened  Gait Abnormalities: Decreased step clearance;Scissoring  Distance (ft): 125 Feet (ft) (x2)  Assistive Device: Gait belt;Walker, rolling  Ambulation - Level of Assistance: Minimal assistance  Interventions: Safety awareness training;Verbal cues       Body Structures Involved:  1. Nerves  2. Heart  3. Lungs  4. Bones  5. Joints  6. Muscles  7.  Ligaments Body Functions Affected:  1. Sensory/Pain  2. Cardio  3. Respiratory  4. Neuromusculoskeletal  5. Movement Related Activities and Participation Affected:  1. General Tasks and Demands  2. Mobility  3. Self Care  4. Interpersonal Interactions and Relationships   Number of elements that affect the Plan of Care: 4+: HIGH COMPLEXITY   CLINICAL PRESENTATION:   Presentation: Stable and uncomplicated: LOW COMPLEXITY   CLINICAL DECISION MAKIN[address] [place] [address] AM-PAC 6 Clicks   Basic Mobility Inpatient Short Form  How much difficulty does the patient currently have. .. Unable A Lot A Little None   1. Turning over in bed (including adjusting bedclothes, sheets and blankets)? [ ] 1   [ ] 2   [X] 3   [ ] 4   2. Sitting down on and standing up from a chair with arms ( e.g., wheelchair, bedside commode, etc.)   [ ] 1   [ ] 2   [X] 3   [ ] 4   3. Moving from lying on back to sitting on the side of the bed? [ ] 1   [ ] 2   [X] 3   [ ] 4   How much help from another person does the patient currently need. .. Total A Lot A Little None   4. Moving to and from a bed to a chair (including a wheelchair)? [ ] 1   [ ] 2   [X] 3   [ ] 4   5. Need to walk in hospital room? [ ] 1   [X] 2   [ ] 3   [ ] 4   6. Climbing 3-5 steps with a railing? [ ] 1   [X] 2   [ ] 3   [ ] 4   © , Trustees of [address] [place] [address], under license to [employer]. All rights reserved    Score:  Initial: 16 Most Recent: X (Date: -- )     Interpretation of Tool:  Represents activities that are increasingly more difficult (i.e. Bed mobility, Transfers, Gait).        Score 24 23 22-20 19-15 14-10 9-7 6       Modifier CH CI CJ CK CL CM CN         · Mobility - Walking and Moving Around:               - CURRENT STATUS:    CK - 40%-59% impaired, limited or restricted               - GOAL STATUS:           CJ - 20%-39% impaired, limited or restricted               - D/C STATUS:                       ---------------To be determined---------------  [place] / [place] / Product Type: Medicare /       Medical Necessity:     · Patient is expected to demonstrate progress in strength, balance, coordination and functional technique to decrease assistance required with gait, transfers, and functional mobility. Reason for Services/Other Comments:  · Patient continues to require skilled intervention due to decreased strength, decreased balance, decreased functional tolerance, decreased cardiopulmonary endurance affecting participation in basic ADLs and functional tasks. Use of outcome tool(s) and clinical judgement create a POC that gives a: Clear prediction of patient's progress: LOW COMPLEXITY                 TREATMENT:   (In addition to Assessment/Re-Assessment sessions the following treatments were rendered)   Pre-treatment Symptoms/Complaints:  R side pain  Pain: Initial:   Pain Intensity 1: 0  Post Session:  8/10 after moving, states he already took Tylenol. Therapeutic Activity: (20 Minutes   ):  Therapeutic activities including Bed transfers, Chair transfers and Ambulation on level ground to improve mobility, strength, balance and coordination. Required moderate Safety awareness training;Verbal cues to promote static and dynamic balance in standing and safe technique with all mobility. Therapeutic Exercise: (  10 minutes):  Exercises per grid below to improve strength and coordination. Required moderate visual and verbal cues to promote proper body alignment and promote proper body breathing techniques. Progressed complexity of movement as indicated.     DATE: 9/29/17       Ambulation        Hip Flexion x15 AB       Long Arc Quads x15 AB       Knee Squeezes x15 AB       Ankle DF/PF x15 AB                                        Key:  A=active, AA=active assisted, P=passive, B=bilaterally, R=right, L=left   DF=dorsiflexion, PF=plantarflexion           Braces/Orthotics/Lines/Etc:   · alondra catheter  · O2 Device: Nasal cannula  Treatment/Session Assessment:    · Response to Treatment:  Ambulated 15 ft in room with [facility]. Some unsteadiness on feet. · Interdisciplinary Collaboration:  · Physical Therapist and Registered Nurse  · After treatment position/precautions:  · Supine in bed  · Bed alarm/tab alert on  · Bed/Chair-wheels locked  · Bed in low position  · Call light within reach  · RN notified  · Compliance with Program/Exercises: Will assess as treatment progresses. · Recommendations/Intent for next treatment session: \"Next visit will focus on advancements to more challenging activities and reduction in assistance provided\".   Total Treatment Duration:PT Patient Time In/Time Out  Time In: 6541  Time Out: 9663     A Yesenia Carrillo, PT, DPT

## 2017-09-29 NOTE — PROGRESS NOTES
Bedside shift change report given to Georgette Mckeon, [address] [place] [address]. Report included the following information SBAR, Kardex, MAR and Recent Results.

## 2017-09-29 NOTE — PROGRESS NOTES
Paulo Garcia  Admission Date: 9/27/2017             Daily Progress Note: 9/29/2017    The patient's chart is reviewed and the patient is discussed with the staff. 81 yo male with a history of PAF and on Eliquis, CAD with remote CABG, chronic s-CHF with ICD, CKD, HTN, HLD, COPD, and chronic respiratory failure maintained on o2 at 3 lpm via NC (followed by [facility] and most recently seen 9/15/17). He presented to the ER with shortness of breath, productive cough with white sputum and peripheral edema. Was diagnosed with UTI by PCP and placed on Bactrim. Had had recent falls and orthopnea. In the ER BNP was 1967 and started on IV Lasix. Was hypoxic on home dose O2 at 3L with sat 89%. We are consulted for bilateral pleural effusions. Subjective:     Currently on o2 at 3 lpm with O2 sat 96%. + occasional cough productive of small amounts of clear colored mucus. Denies chest pain or palpitations. Some ongoing sob.      Current Facility-Administered Medications   Medication Dose Route Frequency    diphenhydrAMINE (BENADRYL) capsule 25 mg  25 mg Oral QHS PRN    furosemide (LASIX) tablet 80 mg  80 mg Oral BID    acetaminophen (TYLENOL) tablet 650 mg  650 mg Oral Q6H PRN    albuterol (PROVENTIL VENTOLIN) nebulizer solution 2.5 mg  2.5 mg Nebulization Q6H RT    amiodarone (CORDARONE) tablet 200 mg  200 mg Oral Q12H    aspirin chewable tablet 81 mg  81 mg Oral DAILY    metoprolol succinate (TOPROL-XL) XL tablet 50 mg  50 mg Oral DAILY    pantoprazole (PROTONIX) tablet 40 mg  40 mg Oral ACB    risperiDONE (RisperDAL) tablet 4 mg  4 mg Oral QHS    simvastatin (ZOCOR) tablet 20 mg  20 mg Oral QHS    potassium chloride (KLOR-CON) packet 20 mEq  20 mEq Oral BID    cefTRIAXone (ROCEPHIN) 1 g in 0.9% sodium chloride (MBP/ADV) 50 mL  1 g IntraVENous Q24H    influenza vaccine 2017-18 (3 yrs+)(PF) (FLUZONE QUAD/FLUARIX QUAD) injection 0.5 mL  0.5 mL IntraMUSCular PRIOR TO DISCHARGE    budesonide (PULMICORT) 500 mcg/2 ml nebulizer suspension  500 mcg Nebulization BID RT       Constitutional: negative for fever, chills, sweats  Cardiovascular: negative for chest pain, palpitations, syncope, edema  Gastrointestinal:  negative for dysphagia, reflux, vomiting, diarrhea, abdominal pain, or melena  Neurologic:  negative for focal weakness, numbness, headache    Objective:     Vitals:    09/29/17 0730 09/29/17 0906 09/29/17 1307 09/29/17 1346   BP:  109/53 112/44    Pulse:  66 70    Resp:  19 18    Temp:  98.4 °F (36.9 °C) 97.9 °F (36.6 °C)    SpO2: 96% 98% 98% 96%   Weight:       Height:         Intake and Output:   09/27 1901 - 09/29 0700  In: 1440 [P.O.:1440]  Out: 2750 [Urine:2750]  09/29 0701 - 09/29 1900  In: 720 [P.O.:720]  Out: 700 [Urine:700]    Physical Exam:   Constitution:  the patient is well developed and in no acute distress  EENMT:  Sclera clear, pupils equal, oral mucosa moist  Respiratory: diminished bilaterally, O2 at 3 lpm  Cardiovascular:  RRR without M,G,R  Gastrointestinal: soft and non-tender; with positive bowel sounds. Musculoskeletal: warm without cyanosis. There is no lower leg edema.   Skin:  no jaundice or rashes, no open wounds   Neurologic: no gross neuro deficits     Psychiatric:  alert and oriented x 3    CXR:   9/27          LAB   Recent Labs      09/28/17   0534  09/27/17   1607   WBC  5.3  5.6   HGB  10.5*  11.4*   HCT  31.7*  34.0*   PLT  133*  137*     Recent Labs      09/29/17   0335  09/28/17   0534  09/27/17   1607   NA  132*  135*  132*   K  4.6  4.2  4.8   CL  93*  96*  95*   CO2  32  32  30   GLU  86  73  102*   BUN  28*  25*  25*   CREA  2.62*  2.26*  2.36*   MG  2.0  2.2   --    CA  7.7*  7.8*  8.2*   ALB   --    --   3.1*   TBILI   --    --   0.5   ALT   --    --   27   SGOT   --    --   19         Assessment:  (Medical Decision Making)     Hospital Problems  Date Reviewed: 9/29/2017          Codes Class Noted POA    Pulmonary emphysema ([facility] (Chronic) ICD-10-CM: J43.9  ICD-9-CM: 492.8  9/29/2017 Yes        DNR (do not resuscitate) ICD-10-CM: Z66  ICD-9-CM: V49.86  9/28/2017 Yes        Frequent falls ICD-10-CM: R29.6  ICD-9-CM: V15.88  9/27/2017 Yes    Planning for STR at discharge      Pulmonary edema ICD-10-CM: J81.1  ICD-9-CM: 201  9/27/2017 Yes    On lasix      * (Principal)UTI (urinary tract infection) ICD-10-CM: N39.0  ICD-9-CM: 599.0  9/27/2017 Yes    Rocephin      PAF (paroxysmal atrial fibrillation) (HCC) (Chronic) ICD-10-CM: I48.0  ICD-9-CM: 427.31  6/8/2017 Yes    Regular on exam  eliquis on hold for possible thoracentesis      CKD (chronic kidney disease) stage 3, GFR 30-59 ml/min (Chronic) ICD-10-CM: N18.3  ICD-9-CM: 585.3  12/26/2016 Yes    Creat 2.62 - creat has been >2 since 7/2017      Pleural effusion, bilateral ICD-10-CM: J90  ICD-9-CM: 511.9  12/26/2016 Yes    Small on CXR  Receiving lasix      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 - May 2014         Hyperlipidemia (Chronic) ICD-10-CM: E78.5  ICD-9-CM: 272.4  9/13/2013 Yes        HTN (hypertension) (Chronic) ICD-10-CM: I10  ICD-9-CM: 401.9  9/13/2013 Yes         Plan:  (Medical Decision Making)     --albuterol / Pulmicort  --Rocephin 3 (on Bactrim prior to admission for UTI)  --lasix 80 mg po BID   1.75 liter diuresis yesterday  --eliquis on hold for possible thoracentesis  --PT following for mobility - ambulated 125' today  --Planning for discharge to SNF on Monday    More than 50% of the time documented was spent in face-to-face contact with the patient and in the care of the patient on the floor/unit where the patient is located. Bebe Diehl, ROXANNA     Lungs:  Crackles in the bases  Heart:  RRR with no Murmur/Rubs/Gallops    Additional Comments:  CXR with minimal effusion. No need for tap and will resume Eliquis    I have spoken with and examined the patient.  I agree with the above assessment and plan as documented.     Elena Fallon MD

## 2017-09-29 NOTE — PROGRESS NOTES
Bedside and Verbal shift change report given to self (oncoming nurse) by Salomon Biggs RN (offgoing nurse). Report included the following information SBAR, Kardex, MAR and Recent Results.      Bed alarm on

## 2017-09-29 NOTE — PROGRESS NOTES
Bedside and Verbal shift change report given to self (oncoming nurse) by Jessika Marie RN (offgoing nurse). Report included the following information SBAR, Kardex, MAR and Recent Results.

## 2017-09-29 NOTE — PROGRESS NOTES
Problem: Breathing Pattern - Ineffective  Goal: *Absence of hypoxia  Outcome: Progressing Towards Goal  Will monitor patient for changes in respiratory status. Patient told to inform RN of any difficulty breathing. Call light in reach.

## 2017-09-29 NOTE — PROGRESS NOTES
PT recommends skilled rehab for increase strength and mobility. Sent referrals to [facility] through [facility] and [facility]. CM following. Patient had bed offers from [facility] with semi-private and [facility] with private. Family chose [facility]. Notified [place] patient accepted bed and [facility] that patient declined. SNF bed available Monday.

## 2017-09-29 NOTE — PROGRESS NOTES
Problem: Falls - Risk of  Goal: *Absence of Falls  Document Giselle Fall Risk and appropriate interventions in the flowsheet. Outcome: Progressing Towards Goal  Fall Risk Interventions:  Mobility Interventions: Bed/chair exit alarm, Communicate number of staff needed for ambulation/transfer, Patient to call before getting OOB           Medication Interventions: Bed/chair exit alarm, Patient to call before getting OOB, Teach patient to arise slowly     Elimination Interventions: Bed/chair exit alarm, Call light in reach, Patient to call for help with toileting needs, Toilet paper/wipes in reach     History of Falls Interventions: Bed/chair exit alarm     Pt progressing towards goal. No falls since admission. Bed low and locked. Call light within reach. Side rails x 2. Gripper socks applied. Personal belongings within reach. Pt verbalizes understanding to call for assistance.    Bed alarm on

## 2017-09-29 NOTE — PROGRESS NOTES
Follow-up visit requested by staff. Offered spiritual interventions, including assurance of prayers.      Tray Pratt 68  Board Certified

## 2017-09-30 NOTE — PROGRESS NOTES
Problem: Falls - Risk of  Goal: *Absence of Falls  Document Giselle Fall Risk and appropriate interventions in the flowsheet.    Fall Risk Interventions:  Mobility Interventions: Bed/chair exit alarm           Medication Interventions: Bed/chair exit alarm     Elimination Interventions: Bed/chair exit alarm     History of Falls Interventions: Bed/chair exit alarm

## 2017-09-30 NOTE — PROGRESS NOTES
Bedside and Verbal shift change report given to Elizabeth Don RN (oncoming nurse) by self (offgoing nurse). Report included the following information SBAR, Kardex, MAR and Recent Results.

## 2017-09-30 NOTE — PROGRESS NOTES
Verbal bedside report received from Agustín Dumont, [facility]. Assumed care of patient.

## 2017-09-30 NOTE — PROGRESS NOTES
Bedside and Verbal shift change report given to self (oncoming nurse) by Chrissy RN (offgoing nurse). Report included the following information SBAR, Kardex, MAR and Recent Results.

## 2017-09-30 NOTE — PROGRESS NOTES
Jhoan Gomez  Admission Date: 9/27/2017             Daily Progress Note: 9/30/2017    The patient's chart is reviewed and the patient is discussed with the staff. 79 yo male with a history of PAF and on Eliquis, CAD with remote CABG, chronic s-CHF with ICD, CKD, HTN, HLD, COPD, and chronic respiratory failure maintained on o2 at 3 lpm via NC (followed by [facility] and most recently seen 9/15/17). He presented to the ER with shortness of breath, productive cough with white sputum and peripheral edema. Was diagnosed with UTI by PCP and placed on Bactrim. Had had recent falls and orthopnea. In the ER BNP was 1967 and started on IV Lasix. Was hypoxic on home dose O2 at 3L with sat 89%. We are consulted for bilateral pleural effusions. Subjective:     Patient awake and off oxygen. Reports feels mild congestion and is constipated since no BM since admission 4 days ago. I checked his RA saturation and was 79% and restarted his oxygen at 2 LPM since was not in his nose.      Current Facility-Administered Medications   Medication Dose Route Frequency    diphenhydrAMINE (BENADRYL) capsule 25 mg  25 mg Oral QHS PRN    furosemide (LASIX) tablet 80 mg  80 mg Oral BID    apixaban (ELIQUIS) tablet 2.5 mg  2.5 mg Oral BID    acetaminophen (TYLENOL) tablet 650 mg  650 mg Oral Q6H PRN    albuterol (PROVENTIL VENTOLIN) nebulizer solution 2.5 mg  2.5 mg Nebulization Q6H RT    amiodarone (CORDARONE) tablet 200 mg  200 mg Oral Q12H    aspirin chewable tablet 81 mg  81 mg Oral DAILY    metoprolol succinate (TOPROL-XL) XL tablet 50 mg  50 mg Oral DAILY    pantoprazole (PROTONIX) tablet 40 mg  40 mg Oral ACB    risperiDONE (RisperDAL) tablet 4 mg  4 mg Oral QHS    simvastatin (ZOCOR) tablet 20 mg  20 mg Oral QHS    potassium chloride (KLOR-CON) packet 20 mEq  20 mEq Oral BID    cefTRIAXone (ROCEPHIN) 1 g in 0.9% sodium chloride (MBP/ADV) 50 mL  1 g IntraVENous Q24H    influenza vaccine 2017-18 (3 yrs+)(PF) (FLUZONE QUAD/FLUARIX QUAD) injection 0.5 mL  0.5 mL IntraMUSCular PRIOR TO DISCHARGE    budesonide (PULMICORT) 500 mcg/2 ml nebulizer suspension  500 mcg Nebulization BID RT       Constitutional: negative for fever, chills, sweats  Cardiovascular: negative for chest pain, palpitations, syncope, edema  Gastrointestinal:  negative for dysphagia, reflux, vomiting, diarrhea, abdominal pain, or melena  Neurologic:  negative for focal weakness, numbness, headache    Objective:     Vitals:    09/30/17 0435 09/30/17 0726 09/30/17 0813 09/30/17 0939   BP: 102/50  115/48 105/52   Pulse: 62  69 70   Resp: 20   18   Temp: 97.4 °F (36.3 °C)   97.6 °F (36.4 °C)   SpO2: 97% 98%  97%   Weight:       Height:         Intake and Output:   09/28 1901 - 09/30 0700  In: 1760 [P.O.:1760]  Out: 4000 [Urine:4000]       Physical Exam:   Constitution:  the patient is well developed and in no acute distress  EENMT:  Sclera clear, pupils equal, oral mucosa moist  Respiratory: diminished bilaterally, O2 at 2 lpm  Cardiovascular:  RRR without M,G,R  Gastrointestinal: full but non-tender; with positive bowel sounds. Musculoskeletal: warm without cyanosis. There is no lower leg edema.   Skin:  no jaundice or rashes, no open wounds   Neurologic: no gross neuro deficits     Psychiatric:  alert and oriented x 3    CXR:   9/27          LAB   Recent Labs      09/28/17   0534  09/27/17   1607   WBC  5.3  5.6   HGB  10.5*  11.4*   HCT  31.7*  34.0*   PLT  133*  137*     Recent Labs      09/30/17   0400  09/29/17   0335  09/28/17   0534  09/27/17   1607   NA  134*  132*  135*  132*   K  4.2  4.6  4.2  4.8   CL  94*  93*  96*  95*   CO2  32  32  32  30   GLU  70  86  73  102*   BUN  25*  28*  25*  25*   CREA  2.47*  2.62*  2.26*  2.36*   MG  2.2  2.0  2.2   --    CA  7.7*  7.7*  7.8*  8.2*   ALB   --    --    --   3.1*   TBILI   --    --    --   0.5   ALT   --    --    --   27   SGOT   --    --    --   19         Assessment: (Medical Decision Making)     Hospital Problems  Date Reviewed: 9/29/2017          Codes Class Noted POA    Pulmonary emphysema ([facility] [address] (Chronic) ICD-10-CM: J43.9  ICD-9-CM: 492.8  9/29/2017 Yes        DNR (do not resuscitate) ICD-10-CM: Z66  ICD-9-CM: V49.86  9/28/2017 Yes        Frequent falls ICD-10-CM: R29.6  ICD-9-CM: V15.88  9/27/2017 Yes    Planning for STR at discharge      Pulmonary edema ICD-10-CM: J81.1  ICD-9-CM: 485  9/27/2017 Yes    On lasix      * (Principal)UTI (urinary tract infection) ICD-10-CM: N39.0  ICD-9-CM: 599.0  9/27/2017 Yes    Rocephin      PAF (paroxysmal atrial fibrillation) (HCC) (Chronic) ICD-10-CM: I48.0  ICD-9-CM: 427.31  6/8/2017 Yes    Regular on exam  eliquis on hold for possible thoracentesis      CKD (chronic kidney disease) stage 3, GFR 30-59 ml/min (Chronic) ICD-10-CM: N18.3  ICD-9-CM: 585.3  12/26/2016 Yes    Creat 2.62 - creat has been >2 since 7/2017      Pleural effusion, bilateral ICD-10-CM: J90  ICD-9-CM: 511.9  12/26/2016 Yes    Small on CXR  Receiving lasix      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 - May 2014         Hyperlipidemia (Chronic) ICD-10-CM: E78.5  ICD-9-CM: 272.4  9/13/2013 Yes        HTN (hypertension) (Chronic) ICD-10-CM: I10  ICD-9-CM: 401.9  9/13/2013 Yes         Plan:  (Medical Decision Making)     --need to use oxygen  --continue nebs  --continue abx on rocephin D4 and was on bactrim as outpatient for UTI  --lasix per cardiology  --PT following for mobility    More than 50% of the time documented was spent in face-to-face contact with the patient and in the care of the patient on the floor/unit where the patient is located.     Leon Ricardo MD

## 2017-09-30 NOTE — PROGRESS NOTES
Bedside shift change report given to Slick Joyce [facility]. Report included the following information SBAR, Kardex, MAR and Recent Results.

## 2017-09-30 NOTE — PROGRESS NOTES
[facility] CARDIOLOGY PROGRESS NOTE           9/30/2017 9:36 AM    Admit Date: 9/27/2017      Subjective:   Patient remains weak and fatigued. He is chronically disabled at home. He has become less ambulatory and falls frequently. Admitted with debility and UTI complicated by CHF.    ROS:  Cardiovascular:  As noted above    Objective:      Vitals:    09/30/17 0333 09/30/17 0435 09/30/17 0726 09/30/17 0813   BP:  102/50  115/48   Pulse:  62  69   Resp:  20     Temp:  97.4 °F (36.3 °C)     SpO2: 96% 97% 98%    Weight:       Height:           Physical Exam:  General-No Acute Distress  Neck- supple, no JVD  CV- regular rate and rhythm no MRG  Lung- clear bilaterally  Abd- soft, nontender, nondistended  Ext- no edema bilaterally. Skin- warm and dry    Data Review:   Recent Labs      09/30/17   0400  09/29/17   0335  09/28/17   0534   09/27/17   1607   NA  134*  132*  135*   --   132*   K  4.2  4.6  4.2   --   4.8   MG  2.2  2.0  2.2   < >   --    BUN  25*  28*  25*   --   25*   CREA  2.47*  2.62*  2.26*   --   2.36*   GLU  70  86  73   --   102*   WBC   --    --   5.3   --   5.6   HGB   --    --   10.5*   --   11.4*   HCT   --    --   31.7*   --   34.0*   PLT   --    --   133*   --   137*    < > = values in this interval not displayed. Assessment/Plan:     Principal Problem:    UTI (urinary tract infection) (9/27/2017)    Completed therapy    Active Problems:    Hyperlipidemia (9/13/2013)    Simvastatin      HTN (hypertension) (9/13/2013)    This is stable      ICD (implantable cardioverter-defibrillator) in place (5/30/2014)    Normal function - need to address patient goals.   Appears appropriate for hospice and may need to turn off therapies on ICD      Pulmonary emphysema ([address] (9/29/2017)    Chronic and severe      CKD (chronic kidney disease) stage 3, GFR 30-59 ml/min (12/26/2016)    This is stable      Pleural effusion, bilateral (12/26/2016)    Minimal      PAF (paroxysmal atrial fibrillation) ([facility] [address] (6/8/2017)    Rate controlled and anticoagulated      Frequent falls (9/27/2017)    Debility is severe and worse.   Needs placement and likely transfer to hospice      Pulmonary edema (9/27/2017)    On oral lasix      DNR (do not resuscitate) (9/28/2017)    As above          Nicolette Tillman MD  9/30/2017 9:36 AM

## 2017-10-01 NOTE — PROGRESS NOTES
Bedside and Verbal shift change report given to self (oncoming nurse) by Sejal Gold RN (offgoing nurse). Report included the following information SBAR, Kardex, Intake/Output, MAR and Recent Results.

## 2017-10-01 NOTE — PROGRESS NOTES
Bedside and Verbal shift change report given to [address], RN (oncoming nurse) by self (offgoing nurse). Report included the following information SBAR, Kardex, MAR and Recent Results.

## 2017-10-01 NOTE — PROGRESS NOTES
Problem: Falls - Risk of  Goal: *Absence of Falls  Document Giselle Fall Risk and appropriate interventions in the flowsheet. Outcome: Progressing Towards Goal  Patient has not experienced a fall. Patient weak with walking but patient knows to call for help with ambulation and going to the bathroom. Bed alarm on. Gripper socks on.   Bed low and locked and call light within reach

## 2017-10-01 NOTE — PROGRESS NOTES
[facility] [place] CARDIOLOGY PROGRESS NOTE           10/1/2017 9:36 AM    Admit Date: 9/27/2017      Subjective:   Patient remains weak and fatigued. He is chronically disabled at home. He has become less ambulatory and falls frequently. Admitted with debility and UTI complicated by CHF.    ROS:  Cardiovascular:  As noted above    Objective:      Vitals:    10/01/17 0037 10/01/17 0215 10/01/17 0426 10/01/17 0700   BP: 109/60  126/65    Pulse: 71  71    Resp: 16  18    Temp: 98.3 °F (36.8 °C)  98.2 °F (36.8 °C)    SpO2: 98% 96% 97% 96%   Weight:   66.9 kg (147 lb 8 oz)    Height:           Physical Exam:  General-No Acute Distress  Neck- supple, no JVD  CV- regular rate and rhythm no MRG  Lung- clear bilaterally  Abd- soft, nontender, nondistended  Ext- no edema bilaterally. Skin- warm and dry    Data Review:   Recent Labs      10/01/17   0344  09/30/17   0400  09/29/17   0335   NA   --   134*  132*   K   --   4.2  4.6   MG  2.4  2.2  2.0   BUN   --   25*  28*   CREA   --   2.47*  2.62*   GLU   --   70  86       Assessment/Plan:     Principal Problem:    UTI (urinary tract infection) (9/27/2017)    Completed therapy    Active Problems:      Volume overload - Patient stable on oral lasix. Follow labs. EF 25-20%. On metoprolol succinate and no ACE/ARB due to CKD      Hyperlipidemia (9/13/2013)    Simvastatin      HTN (hypertension) (9/13/2013)    This is stable      ICD (implantable cardioverter-defibrillator) in place (5/30/2014)    Normal function - need to address patient goals.   Appears appropriate for hospice and may need to turn off therapies on ICD      Pulmonary emphysema ([facility] [address] (9/29/2017)    Chronic and severe      CKD (chronic kidney disease) stage 3, GFR 30-59 ml/min (12/26/2016)    This is stable      Pleural effusion, bilateral (12/26/2016)    Minimal      PAF (paroxysmal atrial fibrillation) ([address] (6/8/2017)    Rate controlled and anticoagulated      Frequent falls (9/27/2017) Debility is severe and worse.   Needs placement and likely transfer to hospice      Pulmonary edema (9/27/2017)    On oral lasix      DNR (do not resuscitate) (9/28/2017)    As above          Harvinder Soto MD  10/1/2017 9:36 AM

## 2017-10-01 NOTE — PROGRESS NOTES
Bedside and Verbal shift change report given to Giuliana George RN (oncoming nurse) by self Antonio Ledezma nurse). Report included the following information SBAR, Kardex, Intake/Output, MAR and Recent Results.

## 2017-10-01 NOTE — PROGRESS NOTES
Aaron Branch  Admission Date: 9/27/2017             Daily Progress Note: 10/1/2017    The patient's chart is reviewed and the patient is discussed with the staff. 79 yo male with a history of PAF and on Eliquis, CAD with remote CABG, chronic s-CHF with ICD, CKD, HTN, HLD, COPD, and chronic respiratory failure maintained on o2 at 3 lpm via NC (followed by [facility] and most recently seen 9/15/17). He presented to the ER with shortness of breath, productive cough with white sputum and peripheral edema. Was diagnosed with UTI by PCP and placed on Bactrim. Had had recent falls and orthopnea. In the ER BNP was 1967 and started on IV Lasix. Was hypoxic on home dose O2 at 3L with sat 89%. We are consulted for bilateral pleural effusions. Subjective:     patient awake and feels good. breathing with less dyspnea today . Still on oxygen.      Current Facility-Administered Medications   Medication Dose Route Frequency    magnesium hydroxide (MILK OF MAGNESIA) 400 mg/5 mL oral suspension 30 mL  30 mL Oral DAILY PRN    diphenhydrAMINE (BENADRYL) capsule 25 mg  25 mg Oral QHS PRN    furosemide (LASIX) tablet 80 mg  80 mg Oral BID    apixaban (ELIQUIS) tablet 2.5 mg  2.5 mg Oral BID    acetaminophen (TYLENOL) tablet 650 mg  650 mg Oral Q6H PRN    albuterol (PROVENTIL VENTOLIN) nebulizer solution 2.5 mg  2.5 mg Nebulization Q6H RT    amiodarone (CORDARONE) tablet 200 mg  200 mg Oral Q12H    aspirin chewable tablet 81 mg  81 mg Oral DAILY    metoprolol succinate (TOPROL-XL) XL tablet 50 mg  50 mg Oral DAILY    pantoprazole (PROTONIX) tablet 40 mg  40 mg Oral ACB    risperiDONE (RisperDAL) tablet 4 mg  4 mg Oral QHS    simvastatin (ZOCOR) tablet 20 mg  20 mg Oral QHS    potassium chloride (KLOR-CON) packet 20 mEq  20 mEq Oral BID    cefTRIAXone (ROCEPHIN) 1 g in 0.9% sodium chloride (MBP/ADV) 50 mL  1 g IntraVENous Q24H    influenza vaccine 2017-18 (3 yrs+)(PF) (FLUZONE QUAD/FLUARIX QUAD) injection 0.5 mL  0.5 mL IntraMUSCular PRIOR TO DISCHARGE    budesonide (PULMICORT) 500 mcg/2 ml nebulizer suspension  500 mcg Nebulization BID RT       Constitutional: negative for fever, chills, sweats  Cardiovascular: negative for chest pain, palpitations, syncope, edema  Gastrointestinal:  negative for dysphagia, reflux, vomiting, diarrhea, abdominal pain, or melena  Neurologic:  negative for focal weakness, numbness, headache    Objective:     Vitals:    10/01/17 0215 10/01/17 0426 10/01/17 0700 10/01/17 0907   BP:  126/65  115/52   Pulse:  71  63   Resp:  18  18   Temp:  98.2 °F (36.8 °C)  96.7 °F (35.9 °C)   SpO2: 96% 97% 96% 98%   Weight:  147 lb 8 oz (66.9 kg)     Height:         Intake and Output:   09/29 1901 - 10/01 0700  In: 1520 [P.O.:1520]  Out: 4100 [Urine:4100]  10/01 0701 - 10/01 1900  In: 200 [P.O.:200]  Out: -     Physical Exam:   Constitution:  the patient is well developed and in no acute distress  EENMT:  Sclera clear, pupils equal, oral mucosa moist  Respiratory: diminished bilaterally, O2 at 2 lpm  Cardiovascular:  RRR without M,G,R  Gastrointestinal: full but non-tender; with positive bowel sounds. Musculoskeletal: warm without cyanosis. There is no lower leg edema. Skin:  no jaundice or rashes, no open wounds   Neurologic: no gross neuro deficits     Psychiatric:  alert and oriented x 3    CXR:   9/27          LAB   No results for input(s): WBC, HGB, HCT, PLT, INR, HGBEXT, HCTEXT, PLTEXT, HGBEXT, HCTEXT, PLTEXT in the last 72 hours.     No lab exists for component: Mag Joyce  Recent Labs      10/01/17   0344  09/30/17   0400  09/29/17   0335   NA   --   134*  132*   K   --   4.2  4.6   CL   --   94*  93*   CO2   --   32  32   GLU   --   70  86   BUN   --   25*  28*   CREA   --   2.47*  2.62*   MG  2.4  2.2  2.0   CA   --   7.7*  7.7*         Assessment:  (Medical Decision Making)     Hospital Problems  Date Reviewed: 9/29/2017          Codes Class Noted POA Pulmonary emphysema (HCC) (Chronic) ICD-10-CM: J43.9  ICD-9-CM: 492.8  9/29/2017 Yes        DNR (do not resuscitate) ICD-10-CM: Z66  ICD-9-CM: V49.86  9/28/2017 Yes        Frequent falls ICD-10-CM: R29.6  ICD-9-CM: V15.88  9/27/2017 Yes    Planning for STR at discharge      Pulmonary edema ICD-10-CM: J81.1  ICD-9-CM: 674  9/27/2017 Yes    On lasix      * (Principal)UTI (urinary tract infection) ICD-10-CM: N39.0  ICD-9-CM: 599.0  9/27/2017 Yes    Rocephin      PAF (paroxysmal atrial fibrillation) (HCC) (Chronic) ICD-10-CM: I48.0  ICD-9-CM: 427.31  6/8/2017 Yes    Regular on exam  eliquis on hold for possible thoracentesis      CKD (chronic kidney disease) stage 3, GFR 30-59 ml/min (Chronic) ICD-10-CM: N18.3  ICD-9-CM: 585.3  12/26/2016 Yes    Creat 2.62 - creat has been >2 since 7/2017      Pleural effusion, bilateral ICD-10-CM: J90  ICD-9-CM: 511.9  12/26/2016 Yes    Small on CXR  Receiving lasix      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 - May 2014         Hyperlipidemia (Chronic) ICD-10-CM: E78.5  ICD-9-CM: 272.4  9/13/2013 Yes        HTN (hypertension) (Chronic) ICD-10-CM: I10  ICD-9-CM: 401.9  9/13/2013 Yes         Plan:  (Medical Decision Making)     --taper oxygen  --continue nebs  --on rocephin D5 and was on bactrim as outpatient for UTI  --Albuterol / pulmicort  --ICD - per Cardiology may need to turn off - patient appears Hospice appropriate  --PT/OT following for mobility    More than 50% of the time documented was spent in face-to-face contact with the patient and in the care of the patient on the floor/unit where the patient is located.     Arvin Castanon NP

## 2017-10-01 NOTE — PROGRESS NOTES
Problem: Falls - Risk of  Goal: *Absence of Falls  Document Giselle Fall Risk and appropriate interventions in the flowsheet. Outcome: Progressing Towards Goal  Fall Risk Interventions:  Mobility Interventions: (P) Bed/chair exit alarm, Communicate number of staff needed for ambulation/transfer, Patient to call before getting OOB           Medication Interventions: (P) Bed/chair exit alarm, Patient to call before getting OOB, Teach patient to arise slowly     Elimination Interventions: (P) Bed/chair exit alarm, Call light in reach, Toilet paper/wipes in reach, Patient to call for help with toileting needs     History of Falls Interventions: (P) Bed/chair exit alarm     Pt progressing towards goal. No falls since admission. Bed low and locked. Call light within reach. Side rails x 2. Gripper socks applied. Personal belongings within reach. Pt verbalizes understanding to call for assistance.    Bed alarm on

## 2017-10-02 PROBLEM — Z66 DNR (DO NOT RESUSCITATE): Chronic | Status: ACTIVE | Noted: 2017-01-01

## 2017-10-02 NOTE — PROGRESS NOTES
Kiarra Mendez  Admission Date: 9/27/2017             Daily Progress Note: 10/2/2017    The patient's chart is reviewed and the patient is discussed with the staff. 79 yo male with a history of PAF and on Eliquis, CAD with remote CABG, chronic s-CHF with ICD, CKD, HTN, HLD, COPD, and chronic respiratory failure maintained on o2 at 3 lpm via NC (followed by [facility] and most recently seen 9/15/17). He presented to the ER with shortness of breath, productive cough with white sputum and peripheral edema. Was diagnosed with UTI by PCP and placed on Bactrim. Had had recent falls and orthopnea. In the ER BNP was 1967 and started on IV Lasix. Was hypoxic on home dose O2 at 3L with sat 89%. We are consulted for bilateral pleural effusions. Subjective:     Back on baseline flow of O2 at 3lpm.  Planned for discharge today after negative 4.3L total.  Feels well now.     Current Facility-Administered Medications   Medication Dose Route Frequency    magnesium hydroxide (MILK OF MAGNESIA) 400 mg/5 mL oral suspension 30 mL  30 mL Oral DAILY PRN    diphenhydrAMINE (BENADRYL) capsule 25 mg  25 mg Oral QHS PRN    furosemide (LASIX) tablet 80 mg  80 mg Oral BID    apixaban (ELIQUIS) tablet 2.5 mg  2.5 mg Oral BID    acetaminophen (TYLENOL) tablet 650 mg  650 mg Oral Q6H PRN    albuterol (PROVENTIL VENTOLIN) nebulizer solution 2.5 mg  2.5 mg Nebulization Q6H RT    amiodarone (CORDARONE) tablet 200 mg  200 mg Oral Q12H    aspirin chewable tablet 81 mg  81 mg Oral DAILY    metoprolol succinate (TOPROL-XL) XL tablet 50 mg  50 mg Oral DAILY    pantoprazole (PROTONIX) tablet 40 mg  40 mg Oral ACB    risperiDONE (RisperDAL) tablet 4 mg  4 mg Oral QHS    simvastatin (ZOCOR) tablet 20 mg  20 mg Oral QHS    potassium chloride (KLOR-CON) packet 20 mEq  20 mEq Oral BID    cefTRIAXone (ROCEPHIN) 1 g in 0.9% sodium chloride (MBP/ADV) 50 mL  1 g IntraVENous Q24H    influenza vaccine 2017-18 (3 yrs+)(PF) (FLUZONE QUAD/FLUARIX QUAD) injection 0.5 mL  0.5 mL IntraMUSCular PRIOR TO DISCHARGE    budesonide (PULMICORT) 500 mcg/2 ml nebulizer suspension  500 mcg Nebulization BID RT       Constitutional: negative for fever, chills, sweats  Cardiovascular: negative for chest pain, palpitations, syncope, edema  Gastrointestinal:  negative for dysphagia, reflux, vomiting, diarrhea, abdominal pain, or melena  Neurologic:  negative for focal weakness, numbness, headache    Objective:     Vitals:    10/01/17 2029 10/02/17 0047 10/02/17 0512 10/02/17 0708   BP: 95/41 128/59 117/55    Pulse: 61 66 70    Resp: 16 18 18    Temp: 97.7 °F (36.5 °C) 97.5 °F (36.4 °C) 98 °F (36.7 °C)    SpO2: 98% 96% 97% 99%   Weight:   140 lb 12.8 oz (63.9 kg)    Height:         Intake and Output:   09/30 1901 - 10/02 0700  In: 1120 [P.O.:1120]  Out: 2300 [Urine:2300]       Physical Exam:   Constitution:  the patient is well developed and in no acute distress  EENMT:  Sclera clear, pupils equal, oral mucosa moist  Respiratory: diminished bilaterally, O2 at 3 lpm  Cardiovascular:  RRR without M,G,R  Gastrointestinal: full but non-tender; with positive bowel sounds. Musculoskeletal: warm without cyanosis. There is no lower leg edema.   Skin:  no jaundice or rashes, no open wounds   Neurologic: no gross neuro deficits     Psychiatric:  alert and oriented x 3    CXR:   9/27          LAB   Recent Labs      10/02/17   0408  10/01/17   1110   WBC  4.5  5.1   HGB  10.3*  11.0*   HCT  31.1*  34.6*   PLT  135*  135*     Recent Labs      10/02/17   0408  10/01/17   1110  10/01/17   0344  09/30/17   0400   NA  133*  133*   --   134*   K  4.2  4.6   --   4.2   CL  93*  92*   --   94*   CO2  33*  36*   --   32   GLU  83  86   --   70   BUN  20  23   --   25*   CREA  2.03*  2.28*   --   2.47*   MG  2.3   --   2.4  2.2   CA  7.6*  7.8*   --   7.7*         Assessment:  (Medical Decision Making)     Hospital Problems  Date Reviewed: 9/29/2017 Codes Class Noted POA    Pulmonary emphysema ([facility] [address] (Chronic) ICD-10-CM: J43.9  ICD-9-CM: 492.8  9/29/2017 Yes    Continue home inhaler therapy    DNR (do not resuscitate) ICD-10-CM: Z66  ICD-9-CM: V49.86  9/28/2017 Yes        Frequent falls ICD-10-CM: R29.6  ICD-9-CM: V15.88  9/27/2017 Yes    Planning for STR at discharge      Pulmonary edema ICD-10-CM: J81.1  ICD-9-CM: 508  9/27/2017 Yes    S/P lasix      * (Principal)UTI (urinary tract infection) ICD-10-CM: N39.0  ICD-9-CM: 599.0  9/27/2017 Yes          PAF (paroxysmal atrial fibrillation) (HCC) (Chronic) ICD-10-CM: I48.0  ICD-9-CM: 427.31  6/8/2017 Yes    On eliquis      CKD (chronic kidney disease) stage 3, GFR 30-59 ml/min (Chronic) ICD-10-CM: N18.3  ICD-9-CM: 585.3  12/26/2016 Yes    Creat 2.62 - creat has been >2 since 7/2017      Pleural effusion, bilateral ICD-10-CM: J90  ICD-9-CM: 511.9  12/26/2016 Yes    Medical therapy. No thoracentesis indicated      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 - May 2014         Hyperlipidemia (Chronic) ICD-10-CM: E78.5  ICD-9-CM: 272.4  9/13/2013 Yes        HTN (hypertension) (Chronic) ICD-10-CM: I10  ICD-9-CM: 401.9  9/13/2013 Yes         Plan:  (Medical Decision Making)     --Continue medical therapy for CHF with pulmonary edema/effusions which are now improved. --Stable for discharge today. More than 50% of the time documented was spent in face-to-face contact with the patient and in the care of the patient on the floor/unit where the patient is located.     Pierre Muro MD

## 2017-10-02 NOTE — CONSULTS
Palliative Care    Patient: Veronica Fierro MRN: [id number]  SSN: [id number]    YOB: 1935  Age: 80 y.o. Sex: male       Date of Request: 10/2/2017  Date of Consult:  10/2/2017  Reason for Consult:  goals of care  Requesting Physician: Dr. Katalina Frankel     Assessment/Plan:     Principal Diagnosis:    Debility, Unspecified  R53.81  Additional Diagnoses:   · Dyspnea  R06.00  · Frailty  R54  · Counseling, Encounter for Medical Advice  Z71.9  · Encounter for Palliative Care  Z51.5    Palliative Performance Scale (PPS):  PPS: 79    Medical Decision Making:   Reviewed and summarized chart from admission to present. Discussed case with appropriate providers. Reviewed laboratory and x-ray data: CBC, CMP, CXR, recent echo    Patient resting in bed, no distress noted, no family present. Discussed patient's current condition. Patient with good understanding of his multiple, chronic conditions. Patient lives with his daughter and son-in-law. He is going to [facility], and his current goal is to reduce his frequency of falls and return home. He and his family want him to continue living with daughter. I asked him about his wishes regarding healthcare if he got worse in the future. He reviewed his code status, stating he would not us want to get \"theatrical\", and would desire a natural death (DNR present on problem list). He would want to return to the hospital for treatment. We discussed home base support, including home health, palliative care, and hospice. Recommended home health and palliative care for patient. He is established patient at [facility] and agreeable to palliative care referral, will send. Thank you for the opportunity to participate in Mr. Winifred combs. Will discuss findings with members of the interdisciplinary team.      Thank you for this referral.   The Palliative Care team is available from 8 am to 4:30 pm Monday-Friday.   Medical management during other hours is per the primary attending service. .    Subjective:     History obtained from:  Patient and Chart    Chief Complaint: Debility, falls  History of Present Illness:  Mr. Anjali Gil is an 81 yo male with PMH of systolic heart failure (EF 25-30% on last echo), a fib, CAD s/p CABG, COPD, HTN, HLD, CKD, debility, and other history as listed below. Patient presented to [facility] ER on 9/27 with cough, shortness of breath, edema, and falls. He was currently being treated for UTI per PCP. Workup in the ER included CXR that showed bilateral pleural effusions with pulmonary edema. Also, patient's BNP was elevated at 1967. Patient was admitted for IV diuresis and further management. Patient has improved, and will discharge to [facility] today.      Advance Directive: No       Code Status:  DNR            Health Care Power of : Unknown    Past Medical History:   Diagnosis Date    Arrhythmia     paroxysmal a fib    Arthritis     knees and hands    Atelectasis 2/5/2015    CAD (coronary artery disease) 2006    CABG with 4 grafts, no Mi, no stents    Carotid stenosis without infarct     Chest trauma     right sided from lat, remote    CHF (congestive heart failure) ([facility] [address] 9/13/2013    Compensated      Chronic kidney disease     stage lll CKD last creatinine 2.0, today 1.9    Chronic obstructive pulmonary disease (HCC)     Chronic pain     arthritic    Chronic systolic heart failure ([address] 5/30/2014    COPD (chronic obstructive pulmonary disease) ([facility] [address] 9/13/2013    Coronary atherosclerosis of native coronary vessel     Elevated hemidiaphragm     right     GERD (gastroesophageal reflux disease) 9/13/2013    Heart failure ([address]     EF 25-30%    Heart failure, left, with LVEF >40% (HCC)     60%-65% on cath 11/2006    HTN (hypertension) 9/13/2013    Hyperlipidemia 9/13/2013    Hypertension     ICD (implantable cardioverter-defibrillator) in place 5/30/2014    [facility] - May 2014     Liver disease     patient denies    Other chest pain 9/13/2013    Atypical, chest tightness     Pleural effusion, left 12/2006    Prostatic hypertrophy, benign 9/13/2013    Psychiatric disorder     anxiety    Restrictive lung disease     Rheumatic aortic insufficiency     Rhinitis 2/5/2015    S/P CABG (coronary artery bypass graft)       Past Surgical History:   Procedure Laterality Date    CARDIAC SURG PROCEDURE UNLIST  11/2006    cabg x 4    CHEST SURGERY PROCEDURE UNLISTED  03/2017    fluid removed from left lung    HX CATARACT REMOVAL      bilateral    HX PACEMAKER  5/2014    ICD     Family History   Problem Relation Age of Onset    Hypertension Other     Stroke Other      CVA    Other Other      renal failure      Social History   Substance Use Topics    Smoking status: Former Smoker     Packs/day: 1.00     Years: 30.00     Quit date: 1/1/1989    Smokeless tobacco: Never Used    Alcohol use 1.5 oz/week     3 Cans of beer per week      Comment: occasional     Prior to Admission medications    Medication Sig Start Date End Date Taking? Authorizing Provider   acetaminophen (TYLENOL) 325 mg tablet Take 2 Tabs by mouth every six (6) hours as needed. 10/2/17  Yes SHEELA De   amiodarone (CORDARONE) 200 mg tablet Take 1 Tab by mouth two (2) times a day. 10/2/17  Yes SHEELA De   metoprolol succinate (TOPROL-XL) 50 mg XL tablet Take 1 Tab by mouth daily. 10/2/17  Yes SHEELA De   magnesium hydroxide (MILK OF MAGNESIA) 400 mg/5 mL suspension Take 30 mL by mouth nightly as needed for Constipation. 10/2/17  Yes SHEELA De   albuterol (PROVENTIL VENTOLIN) 2.5 mg /3 mL (0.083 %) nebulizer solution 1 vial via nebulizer qid and prn;  Bill to medicare part B, dx COPD - J44.9  Indications: Chronic Obstructive Pulmonary Disease 9/15/17   Patricia Barraza NP   apixaban (ELIQUIS) 2.5 mg tablet Take 2.5 mg by mouth two (2) times a day.     Historical Provider   melatonin 5 mg cap capsule Take 1 Cap by mouth nightly. 7/25/17   Mal Schmitz MD   risperiDONE (RISPERDAL) 4 mg tablet Take 4 mg by mouth nightly. Historical Provider   potassium chloride (KLOR-CON) 20 mEq packet Take 20 mEq by mouth two (2) times a day. Historical Provider   furosemide (LASIX) 80 mg tablet Take 1 Tab by mouth two (2) times a day. Patient taking differently: Take 80 mg by mouth daily. 3/31/17   Adolm Cooks, MD   lisinopril (PRINIVIL, ZESTRIL) 40 mg tablet TAKE ONE TABLET BY MOUTH ONCE DAILY  Patient taking differently: 40 mg every morning. TAKE ONE TABLET BY MOUTH ONCE DAILY 3/31/17   Adolm Cooks, MD   simvastatin (ZOCOR) 20 mg tablet Take 1 Tab by mouth nightly. 3/31/17   Adolm Cooks, MD   montelukast (SINGULAIR) 10 mg tablet 1 po q hs  Patient taking differently: 10 mg nightly. 1 po q hs 3/15/17   Belle Bryan NP   diphenhydrAMINE (BENADRYL) 25 mg capsule Take 25 mg by mouth nightly. Historical Provider   ZINC ACETATE PO Take  by mouth. Holding for surgery    Historical Provider   OXYGEN-AIR DELIVERY SYSTEMS 3 L by Nasal route nightly. Historical Provider   VITAMIN E, DL,TOCOPHERYL ACET, (VITAMIN E ACETATE) 400 unit cap capsule Take  by mouth daily. Holding for surgery    Historical Provider   ascorbic acid (VITAMIN C) 500 mg tablet Take  by mouth. Historical Provider   VIT C/VIT E/LUTEIN/MIN/OMEGA-3 (OCUVITE PO) Take  by mouth. Holding for surgery    Historical Provider   albuterol [facility] HFA) 90 mcg/actuation inhaler 2 puffs qid prn 1/20/16   Belle Bryan NP   budesonide-formoterol [facility]) 160-4.5 mcg/actuation HFA inhaler 2 puffs bid, rinse mouth after use 1/20/16   Belle Bryan NP   Cholecalciferol, Vitamin D3, (VITAMIN D3) 1,000 unit cap Take  by mouth. Historical Provider   aspirin (BABY ASPIRIN) 81 mg chewable tablet Take 81 mg by mouth daily. Historical Provider   omeprazole (PRILOSEC) 20 mg capsule Take 20 mg by mouth nightly.     Historical Provider       No Known Allergies Review of Systems:  A comprehensive review of systems was negative except for:   Constitutional: Positive for weakness. Respiratory: Positive for dyspnea on exertion. Objective:     Visit Vitals    /55    Pulse 70    Temp 98 °F (36.7 °C)    Resp 18    Ht 5' 6\" (1.676 m)    Wt 63.9 kg (140 lb 12.8 oz)    SpO2 99%    BMI 22.73 kg/m2        Physical Exam:    General:  Elderly. Cooperative. No acute distress. Eyes:  Conjunctivae/corneas clear. Nose: Nares normal. Septum midline. O2 via NC. Neck: Supple, symmetrical, trachea midline. Lungs:   Mildly diminished bilaterally, unlabored. Heart:  Regular rate and rhythm. Abdomen:   Soft, non-tender, non-distended. Extremities: Normal, atraumatic, no cyanosis or edema. Skin: Skin color, texture, turgor normal. No rash. Neurologic: Nonfocal.   Psych: Alert and oriented.       Assessment:     Hospital Problems  Date Reviewed: 9/29/2017          Codes Class Noted POA    Pulmonary emphysema ([facility] [address] (Chronic) ICD-10-CM: J43.9  ICD-9-CM: 492.8  9/29/2017 Yes        DNR (do not resuscitate) ICD-10-CM: Z66  ICD-9-CM: V49.86  9/28/2017 Yes        Frequent falls ICD-10-CM: R29.6  ICD-9-CM: V15.88  9/27/2017 Yes        Pulmonary edema ICD-10-CM: J81.1  ICD-9-CM: 889  9/27/2017 Yes        * (Principal)UTI (urinary tract infection) ICD-10-CM: N39.0  ICD-9-CM: 599.0  9/27/2017 Yes        PAF (paroxysmal atrial fibrillation) (HCC) (Chronic) ICD-10-CM: I48.0  ICD-9-CM: 427.31  6/8/2017 Yes        CKD (chronic kidney disease) stage 3, GFR 30-59 ml/min (Chronic) ICD-10-CM: N18.3  ICD-9-CM: 585.3  12/26/2016 Yes        Pleural effusion, bilateral ICD-10-CM: J90  ICD-9-CM: 511.9  12/26/2016 Yes        ICD (implantable cardioverter-defibrillator) in place (Chronic) ICD-10-CM: Z95.810  ICD-9-CM: V45.02  5/30/2014 Yes    Overview Signed 5/30/2014 12:41 PM by Lida Celestin MD     [facility] - May 2014             Hyperlipidemia (Chronic) ICD-10-CM: E78.5  ICD-9-CM: 272.4  9/13/2013 Yes        HTN (hypertension) (Chronic) ICD-10-CM: I10  ICD-9-CM: 401.9  9/13/2013 Yes              Signed By: Duane Malhotra NP     October 2, 2017

## 2017-10-02 NOTE — PROGRESS NOTES
Care Management Interventions  PCP Verified by CM: Yes  Mode of Transport at Discharge: BLS  Transition of Care Consult (CM Consult): SNF  Partner SNF: Yes  Physical Therapy Consult: Yes  Occupational Therapy Consult: Yes  Current Support Network: Zulema Alvarez Follow Up Transport: Other (see comment) [facility])  Plan discussed with Pt/Family/Caregiver: Yes  Freedom of Choice Offered: Yes  Discharge Location  Discharge Placement: Rehab Unit Subacute    Patient to be discharged to [facility] today. Notified patient and daughter Blase Flood of discharge. Patient and daughter in agreement. Report to be called to the [facility] at [phone].  Transport set up for 1030 am today to [facility] room 310

## 2017-10-02 NOTE — PROGRESS NOTES
Bedside and Verbal shift change report given to Christy Lind RN (oncoming nurse) by self [facility] ). Report included the following information SBAR, Kardex, MAR and Recent Results.

## 2017-10-02 NOTE — PROGRESS NOTES
Discharge instructions provided and discussed with patient. IV and heart monitor removed from patient. Multiple attempts made to call report to [facility]. Christoph stated he is \"unable to locate LPN, RN and nursing supervisor to take report. \" Will call back later.

## 2017-10-02 NOTE — DISCHARGE SUMMARY
[facility] Cardiology Discharge Summary     Patient ID:  Hilaria Luis  [id number]  50 y.o.  1935    Admit date: 9/27/2017    Discharge date:  10/2/2017    Admitting Physician: Efrain Ortega MD     Discharge Physician: SHEELA Sow/Dr. Francesca Hurst     Admission Diagnoses: Frequent falls    Discharge Diagnoses:    Diagnosis    Pulmonary emphysema ([address]    DNR (do not resuscitate)    Frequent falls    Pulmonary edema    UTI (urinary tract infection)    Non-rheumatic mitral regurgitation    BPH with obstruction/lower urinary tract symptoms    BPH (benign prostatic hyperplasia)    PAF (paroxysmal atrial fibrillation) (HCC)    Congestive heart failure (HCC)    Acute on chronic systolic (congestive) heart failure    CKD (chronic kidney disease) stage 3, GFR 30-59 ml/min    Pleural effusion, bilateral    Acute respiratory failure (HCC)    Urinary retention    Hypoxia    Nonrheumatic aortic valve insufficiency    Bilateral carotid artery disease (HCC)    S/P CABG (coronary artery bypass graft)    Coronary atherosclerosis of native coronary vessel    Rhinitis    Elevated hemidiaphragm    Chronic systolic heart failure (HCC)    ICD (implantable cardioverter-defibrillator) in place    Hyperlipidemia    HTN (hypertension)    CAD (coronary artery disease)    COPD (chronic obstructive pulmonary disease) (HCC)    GERD (gastroesophageal reflux disease)    Prostatic hypertrophy, benign     Transitional care follow up with [facility] Cardiology Dr. Martha Lam Friday Oct 13 at [address] office    Cardiology Procedures this admission:  None  Consults: Pulmonary/Intensive care, palliative care     Hospital Course: Patient presented to the emergency department of [facility] with complaints of frequent falls with h/o sHF w EF 25-30% s/p ICD w elevated BNP. Patient was evaluated and subsequently admitted for further cardiac evaluation and treatment.   The patient was started on IV Lasix for diuresis. ACE-I was held due to renal failure. He was noted to have a UTI and started on rocephin. Pulmonary was consulted for possible thoracentesis of B effusions. Eliquis was held. He diuresed 4.3 L and felt better. CXR reviewed w minimal effusion and no need for thoracentesis per pulmonary. Dr Son Vanegas discussed possible placement and transfer to hospice at some point. Social work was consulted to arrange placement. He completed five days of rocephin for UTI. PT/OT were evaluating patient. He seen by palliative care and code status discussed. The morning of 10/2/2016 patient was up feeling well with improved complaints shortness of breath and felt to be stable to transfer to the [place] for rehab. LE edema was improved. Patient's labs were stable with creatinine of 2.03. Patient was seen and examined by Dr. Valerie Enrique and determined stable and ready for discharge. Patient was instructed on the importance of medication compliance, low sodium diet, 2 liter per day fluid restriction and daily weights. For maximized medical therapy of congestive heart failure, patient will continue use of BB but no ACE/ARB due to CKD. The patient will have close transitional care follow up with [address] [place] [address] [address] Son Vanegas Friday Oct 13 at [address] office. Will check CBC, BMP and mag in one week. DISPOSITION: The patient is being discharged to rehab in stable condition on a low saturated fat, low cholesterol and low salt diet. The patient is instructed to advance activities as tolerated to the limit of fatigue or shortness of breath. The patient is informed to monitor daily weights and maintain a 2 liter per day fluid restriction. The patient is instructed to call the office for any shortness of breath, weight gain, or increased peripheral edema.         Discharge Exam:   Visit Vitals    /55    Pulse 70    Temp 98 °F (36.7 °C)    Resp 18    Ht 5' 6\" (1.676 m)    Wt 63.9 kg (140 lb 12.8 oz)    SpO2 99%    BMI 22.73 kg/m2     Patient has been seen by Dr. Bita Cannon: see his progress note for exam details. Recent Results (from the past 24 hour(s))   METABOLIC PANEL, BASIC    Collection Time: 10/01/17 11:10 AM   Result Value Ref Range    Sodium 133 (L) 136 - 145 mmol/L    Potassium 4.6 3.5 - 5.1 mmol/L    Chloride 92 (L) 98 - 107 mmol/L    CO2 36 (H) 21 - 32 mmol/L    Anion gap 5 (L) 7 - 16 mmol/L    Glucose 86 65 - 100 mg/dL    BUN 23 8 - 23 MG/DL    Creatinine 2.28 (H) 0.8 - 1.5 MG/DL    GFR est AA 36 (L) >60 ml/min/1.73m2    GFR est non-AA 29 (L) >60 ml/min/1.73m2    Calcium 7.8 (L) 8.3 - 10.4 MG/DL   BNP    Collection Time: 10/01/17 11:10 AM   Result Value Ref Range     pg/mL   CBC WITH AUTOMATED DIFF    Collection Time: 10/01/17 11:10 AM   Result Value Ref Range    WBC 5.1 4.3 - 11.1 K/uL    RBC 3.52 (L) 4.23 - 5.67 M/uL    HGB 11.0 (L) 13.6 - 17.2 g/dL    HCT 34.6 (L) 41.1 - 50.3 %    MCV 98.3 (H) 79.6 - 97.8 FL    MCH 31.3 26.1 - 32.9 PG    MCHC 31.8 31.4 - 35.0 g/dL    RDW 14.8 (H) 11.9 - 14.6 %    PLATELET 963 (L) 333 - 450 K/uL    MPV 10.5 (L) 10.8 - 14.1 FL    DF AUTOMATED      NEUTROPHILS 82 (H) 43 - 78 %    LYMPHOCYTES 12 (L) 13 - 44 %    MONOCYTES 5 4.0 - 12.0 %    EOSINOPHILS 1 0.5 - 7.8 %    BASOPHILS 0 0.0 - 2.0 %    IMMATURE GRANULOCYTES 0.0 0.0 - 5.0 %    ABS. NEUTROPHILS 4.2 1.7 - 8.2 K/UL    ABS. LYMPHOCYTES 0.6 0.5 - 4.6 K/UL    ABS. MONOCYTES 0.3 0.1 - 1.3 K/UL    ABS. EOSINOPHILS 0.1 0.0 - 0.8 K/UL    ABS. BASOPHILS 0.0 0.0 - 0.2 K/UL    ABS. IMM.  GRANS. 0.0 0.0 - 0.5 K/UL   METABOLIC PANEL, BASIC    Collection Time: 10/02/17  4:08 AM   Result Value Ref Range    Sodium 133 (L) 136 - 145 mmol/L    Potassium 4.2 3.5 - 5.1 mmol/L    Chloride 93 (L) 98 - 107 mmol/L    CO2 33 (H) 21 - 32 mmol/L    Anion gap 7 7 - 16 mmol/L    Glucose 83 65 - 100 mg/dL    BUN 20 8 - 23 MG/DL    Creatinine 2.03 (H) 0.8 - 1.5 MG/DL    GFR est AA 41 (L) >60 ml/min/1.73m2    GFR est non-AA 34 (L) >60 ml/min/1.73m2    Calcium 7.6 (L) 8.3 - 10.4 MG/DL   BNP    Collection Time: 10/02/17  4:08 AM   Result Value Ref Range     pg/mL   CBC WITH AUTOMATED DIFF    Collection Time: 10/02/17  4:08 AM   Result Value Ref Range    WBC 4.5 4.3 - 11.1 K/uL    RBC 3.29 (L) 4.23 - 5.67 M/uL    HGB 10.3 (L) 13.6 - 17.2 g/dL    HCT 31.1 (L) 41.1 - 50.3 %    MCV 94.5 79.6 - 97.8 FL    MCH 31.3 26.1 - 32.9 PG    MCHC 33.1 31.4 - 35.0 g/dL    RDW 14.9 (H) 11.9 - 14.6 %    PLATELET 651 (L) 347 - 450 K/uL    MPV 9.8 (L) 10.8 - 14.1 FL    DF AUTOMATED      NEUTROPHILS 73 43 - 78 %    LYMPHOCYTES 14 13 - 44 %    MONOCYTES 11 4.0 - 12.0 %    EOSINOPHILS 2 0.5 - 7.8 %    BASOPHILS 0 0.0 - 2.0 %    IMMATURE GRANULOCYTES 0.2 0.0 - 5.0 %    ABS. NEUTROPHILS 3.3 1.7 - 8.2 K/UL    ABS. LYMPHOCYTES 0.6 0.5 - 4.6 K/UL    ABS. MONOCYTES 0.5 0.1 - 1.3 K/UL    ABS. EOSINOPHILS 0.1 0.0 - 0.8 K/UL    ABS. BASOPHILS 0.0 0.0 - 0.2 K/UL    ABS. IMM. GRANS. 0.0 0.0 - 0.5 K/UL   MAGNESIUM    Collection Time: 10/02/17  4:08 AM   Result Value Ref Range    Magnesium 2.3 1.8 - 2.4 mg/dL         Patient Instructions:   Current Discharge Medication List      START taking these medications    Details   acetaminophen (TYLENOL) 325 mg tablet Take 2 Tabs by mouth every six (6) hours as needed. magnesium hydroxide (MILK OF MAGNESIA) 400 mg/5 mL suspension Take 30 mL by mouth nightly as needed for Constipation. CONTINUE these medications which have CHANGED    Details   amiodarone (CORDARONE) 200 mg tablet Take 1 Tab by mouth two (2) times a day. Qty: 60 Tab, Refills: 3    Associated Diagnoses: Paroxysmal atrial fibrillation (HCC)      metoprolol succinate (TOPROL-XL) 50 mg XL tablet Take 1 Tab by mouth daily.   Qty: 30 Tab, Refills: 3    Associated Diagnoses: Chronic systolic heart failure ([facility]         CONTINUE these medications which have NOT CHANGED    Details   albuterol (PROVENTIL VENTOLIN) 2.5 mg /3 mL (0.083 %) nebulizer solution 1 vial via nebulizer qid and prn;  Bill to medicare part B, dx COPD - J44.9  Indications: Chronic Obstructive Pulmonary Disease  Qty: 120 Each, Refills: 11    Comments: Dispense #120 vials  Associated Diagnoses: Pulmonary emphysema, unspecified emphysema type (HCC)      apixaban (ELIQUIS) 2.5 mg tablet Take 2.5 mg by mouth two (2) times a day. melatonin 5 mg cap capsule Take 1 Cap by mouth nightly. Qty: 30 Cap, Refills: 11      risperiDONE (RISPERDAL) 4 mg tablet Take 4 mg by mouth nightly. potassium chloride (KLOR-CON) 20 mEq packet Take 20 mEq by mouth two (2) times a day. furosemide (LASIX) 80 mg tablet Take 1 Tab by mouth two (2) times a day. Qty: 180 Tab, Refills: 3      simvastatin (ZOCOR) 20 mg tablet Take 1 Tab by mouth nightly. Qty: 90 Tab, Refills: 3      montelukast (SINGULAIR) 10 mg tablet 1 po q hs  Qty: 30 Tab, Refills: 11      diphenhydrAMINE (BENADRYL) 25 mg capsule Take 25 mg by mouth nightly. ZINC ACETATE PO Take  by mouth. Holding for surgery      OXYGEN-AIR DELIVERY SYSTEMS 3 L by Nasal route nightly. Associated Diagnoses: Hypoxia; Pulmonary emphysema, unspecified emphysema type ([address] Other rhinitis      VITAMIN E, DL,TOCOPHERYL ACET, (VITAMIN E ACETATE) 400 unit cap capsule Take  by mouth daily. Holding for surgery      ascorbic acid (VITAMIN C) 500 mg tablet Take  by mouth. VIT C/VIT E/LUTEIN/MIN/OMEGA-3 (OCUVITE PO) Take  by mouth. Holding for surgery      albuterol (PROAIR HFA) 90 mcg/actuation inhaler 2 puffs qid prn  Qty: 1 Inhaler, Refills: 11      budesonide-formoterol (SYMBICORT) 160-4.5 mcg/actuation HFA inhaler 2 puffs bid, rinse mouth after use  Qty: 1 Inhaler, Refills: 11      Cholecalciferol, Vitamin D3, (VITAMIN D3) 1,000 unit cap Take  by mouth. aspirin (BABY ASPIRIN) 81 mg chewable tablet Take 81 mg by mouth daily. omeprazole (PRILOSEC) 20 mg capsule Take 20 mg by mouth nightly.          STOP taking these medications lisinopril (PRINIVIL, ZESTRIL) 40 mg tablet Comments:   Reason for Stopping:               Signed:  Anna Leach PA-C  10/2/2017  8:48 AM  ATTENDING ADDENDUM:    Patient seen and examined by me. Agree with above note by physician extender. Key findings are:  No CP or MARSHALL, no orthopnea overnight, no palpitations. Still weak but getting stronger. Clinically euvolemic. Ready to go to rehab today. CV- RRR without murmur, JVD none at 60 deg  Lungs- Clear bilaterally  Abd- soft, nontender, nondistended  Ext- no edema    Plan: As above.     Tim Jarrell MD  [address] [facility] [address] [address] Card[address]  Pager [phone]

## 2017-10-06 NOTE — PROGRESS NOTES
[facility] for [facility]. Lay Juan LPN/ Care Coordinator  [address] [facility] [address] Karen  / [place], [address] [facility]  [email or web address] will not be viewable in [address]

## 2018-01-01 ENCOUNTER — HOME CARE VISIT (OUTPATIENT)
Dept: HOSPICE | Facility: HOSPICE | Age: 83
End: 2018-01-01
Payer: MEDICARE

## 2018-01-01 ENCOUNTER — HOME CARE VISIT (OUTPATIENT)
Dept: SCHEDULING | Facility: HOME HEALTH | Age: 83
End: 2018-01-01
Payer: MEDICARE

## 2018-01-01 VITALS
SYSTOLIC BLOOD PRESSURE: 130 MMHG | RESPIRATION RATE: 16 BRPM | DIASTOLIC BLOOD PRESSURE: 85 MMHG | HEART RATE: 78 BPM | TEMPERATURE: 97.6 F

## 2018-01-01 VITALS — SYSTOLIC BLOOD PRESSURE: 110 MMHG | HEART RATE: 76 BPM | RESPIRATION RATE: 22 BRPM | DIASTOLIC BLOOD PRESSURE: 68 MMHG

## 2018-01-01 VITALS — DIASTOLIC BLOOD PRESSURE: 44 MMHG | RESPIRATION RATE: 28 BRPM | SYSTOLIC BLOOD PRESSURE: 76 MMHG | HEART RATE: 64 BPM

## 2018-01-01 VITALS — HEART RATE: 84 BPM | DIASTOLIC BLOOD PRESSURE: 40 MMHG | SYSTOLIC BLOOD PRESSURE: 86 MMHG | RESPIRATION RATE: 24 BRPM

## 2018-01-01 VITALS — HEART RATE: 82 BPM

## 2018-01-01 VITALS — DIASTOLIC BLOOD PRESSURE: 56 MMHG | RESPIRATION RATE: 32 BRPM | HEART RATE: 88 BPM | SYSTOLIC BLOOD PRESSURE: 112 MMHG

## 2018-01-01 VITALS — DIASTOLIC BLOOD PRESSURE: 56 MMHG | SYSTOLIC BLOOD PRESSURE: 98 MMHG | RESPIRATION RATE: 32 BRPM | HEART RATE: 76 BPM

## 2018-01-01 PROCEDURE — HOSPICE MEDICATION HC HH HOSPICE MEDICATION

## 2018-01-01 PROCEDURE — 0651 HSPC ROUTINE HOME CARE

## 2018-01-01 PROCEDURE — G0299 HHS/HOSPICE OF RN EA 15 MIN: HCPCS

## 2018-01-01 PROCEDURE — G0156 HHCP-SVS OF AIDE,EA 15 MIN: HCPCS

## 2018-01-25 NOTE — ED NOTES
Cardiologist at bedside at this time.
Report to Rasheed Chandler RN to assume care of this pt at this time.
TRANSFER - OUT REPORT:    Verbal report given to Vito RN(name) on Berenice Callahan  being transferred to [facility](unit) for routine progression of care       Report consisted of patients Situation, Background, Assessment and   Recommendations(SBAR). Information from the following report(s) SBAR, ED Summary, [facility] and Recent Results was reviewed with the receiving nurse. Lines:   Peripheral IV 03/23/17 Right Arm (Active)   Site Assessment Clean, dry, & intact 3/23/2017  5:47 PM   Phlebitis Assessment 0 3/23/2017  5:47 PM   Infiltration Assessment 0 3/23/2017  5:47 PM   Dressing Status Clean, dry, & intact 3/23/2017  5:47 PM        Opportunity for questions and clarification was provided.       Patient transported with:   Registered Nurse
79

## 2022-11-23 NOTE — CONSULTS
CONSULT NOTE    Berenice Callahan    3/24/2017    Date of Admission:  3/23/2017    The patient's chart is reviewed and the patient is discussed with the staff. Subjective:     Patient is a 80 y.o.  male seen and evaluated at the request of Dr. Cl Bui. Patient was seen in our office yesterday as a work-in appointment. He had gained 16 pounds since 12/2016 and was having some dyspnea. Labs were obtained prior to patient leaving the office. BNP 3765, creatinine 1.96 and patient was advised to go to the ER. Patient was admitted yesterday by Cardiology. CXR with bilateral pleural effusions. Patient's oxygen saturation was in the 80s and placed on 2L NC. We have been consulted for pleural effusions. Past medical history includes: CAD with CABG in 2006, COPD, HTN, chronic systolic heart failure, CKD stage 3. Last PFT on 7/10/2016 with FVC 60%, FEV1 51%, FEV1/FVC 83%. He is managed with Symbicort, Albuterol via nebulizer prn, and oxygen. He uses oxygen at 2L NC every night and as needed during the day. He has recently had to use his oxygen more frequently during the day. Last EF 30-35% 6/2016. Patient denies fever/chills/nausea/vomiting. He denies any recent falls and doesn't use any assistive devices to ambulate. Physical therapy was in the room with patient and is recommending rehab. Patient's wife recently passed away 2 weeks ago and she helped with his care.     Review of Systems  A comprehensive review of systems was negative except for: Constitutional: positive for MARSHALL  Respiratory: positive for dyspnea on exertion or COPD  Cardiovascular: positive for dyspnea, lower extremity edema, dyspnea on exertion  Gastrointestinal: positive for GERD  Musculoskeletal: positive for muscle weakness    Patient Active Problem List   Diagnosis Code    Hyperlipidemia E78.5    HTN (hypertension) I10    CAD (coronary artery disease) I25.10    CHF (congestive heart failure) (HCC) I50.9    Shoulder Elbow Rehabilitation Referral    Patient Name: Sigifredo Alvarado      YOB: 1971    Diagnosis:   1. Status post arthroscopy of right shoulder        Precautions:    Post Op Instructions:  [x] Continuous passive motion (CPM)  [x]Active Elbow range of motion  [x] Exercise in plane of scapula   []  Strengthening     [] Pulley and instruction    [x] Home exercise program (copy to patient)   [] Sling when arm at risk  [] Sling or brace at all times   [x] AAROM: Forward elevation to 140            [x] AAROM: External rotation to 40    [] Isometric external rotator strengthening [x] AAROM: internal rotation: up the back  [x] Isometric abductor strengthening  [x] AAROM: Internal abduction     [] Isometric internal rotator strengthening [x] AAROM: cross-body adduction             Stretching:     Strengthening:  [x] Four quadrant (FE, ER, IR, CBA)  [x] Rotator cuff (ER, IR, Abd)  [x] Forward Elevation    [] External Rotators     [x] External Rotation    [] Internal Rotators  [x] Internal Rotation: up/back   [] Abductors     [x] Internal Rotation: supine in abduction  [] Flexors  [x] Cross-body abduction    [] Extensors  [x] Pendulum (FE, Abd/Add, cw/ccw)  [x] Scapular Stabilizers   [x] Wall-walking (FE, Abd)    [x] Shoulder shrugs     [x] Table slides      [x] Rhomboid pinch  [] Elbow (flex, ext, pron, sup)    [] Lat.  Pull downs     [] Medial epicondylitis program    [] Forward punch   [] Lateral epicondylitis program    [] Internal rotators     [x] Progressive resistive exercises  [] Bench Press        [] Bench press plus  Activities:     [] Lateral pull-downs  [x] Rowing     [] Progressive two-hand supine press  [] Stepper/Exercise bike   [x] Biceps: curls/supination & triceps  [] Swimming  [] Water exercises    Modalities: PRN    Return to Sport:  [] Ultrasound     [] Plyometrics  [] Iontophoresis     [] Rhythmic stabilization  [] Moist heat     [] Core strengthening   [] Massage     [] Sports COPD (chronic obstructive pulmonary disease) ([facility]) J44.9    GERD (gastroesophageal reflux disease) K21.9    Prostatic hypertrophy, benign N40.0    Chronic systolic heart failure ([facility]) I50.22    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    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    Rhinitis J31.0    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    Prior to Admission Medications   Prescriptions Last Dose Informant Patient Reported? Taking? Cholecalciferol, Vitamin D3, (VITAMIN D3) 1,000 unit cap 3/23/2017 at Unknown time  Yes Yes   Sig: Take  by mouth. OXYGEN-AIR DELIVERY SYSTEMS   Yes No   Sig: 3 L by Nasal route daily. 3 LPM Continuous every day and QHS   VIT C/VIT E/LUTEIN/MIN/OMEGA-3 (OCUVITE PO)   Yes No   Sig: Take  by mouth. VITAMIN E, DL,TOCOPHERYL ACET, (VITAMIN E ACETATE) 400 unit cap capsule   Yes No   Sig: Take  by mouth daily. ZINC ACETATE PO   Yes No   Sig: Take  by mouth. albuterol (PROAIR HFA) 90 mcg/actuation inhaler 3/23/2017 at Unknown time  No Yes   Si puffs qid prn   albuterol (PROVENTIL VENTOLIN) 2.5 mg /3 mL (0.083 %) nebulizer solution 3/23/2017 at Unknown time  No Yes   Si vial via nebulizer qid and prn;  Bill to medicare part B, dx COPD - J44.9  Indications: Chronic Obstructive Pulmonary Disease   ascorbic acid (VITAMIN C) 500 mg tablet 3/23/2017 at Unknown time  Yes Yes   Sig: Take  by mouth.    aspirin (BABY ASPIRIN) 81 mg chewable tablet 3/23/2017 at Unknown time  Yes Yes specific program:   [x] Cryotherapy      [] Electrical stimulation     [] Paraffin  [] Whirlpool  [] TENS    [x] Home exercise program (copy to patient). Perform exercises for:   15     minutes    2-3      times/day  [x] Supervised physical therapy  Frequency: []  1x week  [x] 2x week  [] 3x week  [] Other:   Duration: [] 2 weeks   [] 4 weeks  [x] 6 weeks  [] Other:     Additional Instructions:          João Horvath MD, PhD Sig: Take 81 mg by mouth daily. budesonide (RHINOCORT AQUA) 32 mcg/actuation nasal spray 3/23/2017 at Unknown time  Yes Yes   Si Sprays by Both Nostrils route. budesonide-formoterol (SYMBICORT) 160-4.5 mcg/actuation HFA inhaler 3/23/2017 at Unknown time  No Yes   Si puffs bid, rinse mouth after use   diphenhydrAMINE (BENADRYL) 25 mg capsule 3/23/2017 at Unknown time  Yes Yes   Sig: Take 25 mg by mouth nightly. doxazosin (CARDURA) 4 mg tablet 3/23/2017 at Unknown time  Yes Yes   Sig: Take 1 mg by mouth nightly. furosemide (LASIX) 40 mg tablet 3/23/2017 at Unknown time  No Yes   Sig: Take 1 Tab by mouth two (2) times a day. Patient taking differently: Take 40 mg by mouth daily. lisinopril (PRINIVIL, ZESTRIL) 40 mg tablet 3/23/2017 at Unknown time  No Yes   Sig: TAKE ONE TABLET BY MOUTH ONCE DAILY   metoprolol succinate (TOPROL-XL) 50 mg XL tablet   No No   Sig: Take 1 Tab by mouth every twelve (12) hours. montelukast (SINGULAIR) 10 mg tablet   No No   Si po q hs   omeprazole (PRILOSEC) 20 mg capsule   Yes No   Sig: Take 20 mg by mouth two (2) times a day. potassium chloride (KLOR-CON) 20 mEq packet   Yes No   Sig: Take 20 mEq by mouth daily. risperiDONE (RISPERDAL) 3 mg tablet   Yes No   Sig: Take  by mouth nightly. simvastatin (ZOCOR) 20 mg tablet   Yes No   Sig: Take  by mouth nightly. tamsulosin (FLOMAX) 0.4 mg capsule   No No   Sig: Take 1 Cap by mouth nightly.       Facility-Administered Medications: None       Past Medical History:   Diagnosis Date    Arrhythmia     Arthritis     knees and hands    Atelectasis 2015    CAD (coronary artery disease)     CABG    Carotid stenosis without infarct     Chest trauma     [facility] sided from [facility] remote    CHF (congestive heart failure) ([facility] [address] 2013    Compensated      Chronic kidney disease     cysts in kidney    Chronic obstructive pulmonary disease (HCC)     Chronic systolic heart failure ([facility] [address] 2014    COPD (chronic obstructive pulmonary disease) ([facility] [address] 9/13/2013    Coronary atherosclerosis of native coronary vessel     Elevated hemidiaphragm     right     GERD (gastroesophageal reflux disease) 9/13/2013    Heart failure ([facility] [address]     Heart failure, left, with LVEF >40% (HCC)     60%-65% on cath 11/2006    HTN (hypertension) 9/13/2013    Hyperlipidemia 9/13/2013    Hypertension     ICD (implantable cardioverter-defibrillator) in place 5/30/2014    [facility] - May 2014     Liver disease     Other chest pain 9/13/2013    Atypical, chest tightness     Pleural effusion, left 12/2006    Prostatic hypertrophy, benign 9/13/2013    Psychiatric disorder     anxiety    Restrictive lung disease     Rheumatic aortic insufficiency     Rhinitis 2/5/2015    S/P CABG (coronary artery bypass graft)      Past Surgical History:   Procedure Laterality Date    CARDIAC SURG PROCEDURE UNLIST  11/2006    cabg x 4    HX CATARACT REMOVAL      bilateral    HX PACEMAKER  5/2014    ICD     Social History     Social History    Marital status:      Spouse name: N/A    Number of children: N/A    Years of education: N/A     Occupational History    , retired      Social History Main Topics    Smoking status: Former Smoker     Packs/day: 1.00     Years: 30.00     Quit date: 1/1/1989    Smokeless tobacco: Never Used    Alcohol use 1.5 oz/week     3 Cans of beer per week      Comment: occasional    Drug use: No    Sexual activity: Yes     Partners: Female     Other Topics Concern    Not on file     Social History Narrative    He is  and has two children.  He is a retired .      Family History   Problem Relation Age of Onset    Hypertension Other     Stroke Other      CVA    Other Other      renal failure     No Known Allergies    Current Facility-Administered Medications   Medication Dose Route Frequency    perflutren lipid microspheres (DEFINITY) in NS bolus IV  1 mL IntraVENous PRN    doxazosin (CARDURA) tablet 1 mg  1 mg Oral QHS    aspirin chewable tablet 81 mg  81 mg Oral DAILY    simvastatin (ZOCOR) tablet 20 mg  20 mg Oral QHS    lisinopril (PRINIVIL, ZESTRIL) tablet 40 mg  40 mg Oral DAILY    risperiDONE (RisperDAL) tablet 3 mg  3 mg Oral QHS    tamsulosin (FLOMAX) capsule 0.4 mg  0.4 mg Oral QHS    albuterol (PROVENTIL HFA, VENTOLIN HFA, PROAIR HFA) inhaler 1 Puff  1 Puff Inhalation Q6H PRN    metoprolol succinate (TOPROL-XL) XL tablet 50 mg  50 mg Oral Q12H    pantoprazole (PROTONIX) tablet 40 mg  40 mg Oral ACB    furosemide (LASIX) 100 mg in 0.9% sodium chloride 100 mL infusion  10 mg/hr IntraVENous CONTINUOUS    budesonide (PULMICORT) 500 mcg/2 ml nebulizer suspension  500 mcg Nebulization BID RT    And    albuterol CONCENTRATE 2.5mg/0.5 mL neb soln  2.5 mg Nebulization Q6H RT         Objective:     Vitals:    03/24/17 0247 03/24/17 0551 03/24/17 0740 03/24/17 0812   BP:  136/66  155/66   Pulse:  78  86   Resp:  17  18   Temp:  97.6 °F (36.4 °C)  97 °F (36.1 °C)   SpO2: 98% 99% 95% 93%   Weight:  164 lb (74.4 kg)     Height:           PHYSICAL EXAM     Constitutional:  the patient is well developed and in no acute distress, on 3L NC with O2 sat 93%  EENMT:  Sclera clear, pupils equal, oral mucosa moist  Respiratory: diminished bilateral bases, no wheezing   Cardiovascular:  RRR without M,G,R  Gastrointestinal: soft and non-tender; with positive bowel sounds. Musculoskeletal: warm without cyanosis. There is 2+ pitting lower leg edema.   Skin:  no jaundice or rashes, no wounds   Neurologic: no gross neuro deficits     Psychiatric:  alert and oriented x 3    Chest X-ray 3/23/17:          Recent Labs      03/23/17   1717   WBC  5.2   HGB  13.3*   HCT  41.6   PLT  129*     Recent Labs      03/23/17   1717  03/23/17   1400   NA  137  138   K  4.9  4.6   CL  99  99   GLU  98  115*   CO2  33*  37*   BUN  31*  29*   CREA  1.96*  1.95*   CA  8.7  8.8   TROIQ  <0.02*   --    ALB  3.4 --    TBILI  0.9   --    ALT  24   --    SGOT  23   --      No results for input(s): PH, PCO2, PO2, HCO3 in the last 72 hours. No results for input(s): LCAD, LAC in the last 72 hours. Assessment:  (Medical Decision Making)     Hospital Problems  Date Reviewed: 3/24/2017          Codes Class Noted POA    * (Principal)Acute on chronic systolic (congestive) heart failure (HCC) ICD-10-CM: I50.23  ICD-9-CM: 428.23, 428.0  12/26/2016 Yes    BNP 3765 yesterday, on lasix gtt     CKD (chronic kidney disease) stage 3, GFR 30-59 ml/min (Chronic) ICD-10-CM: N18.3  ICD-9-CM: 585.3  12/26/2016 Yes    Creatinine 1.96     Pleural effusion, bilateral ICD-10-CM: J90  ICD-9-CM: 511.9  12/26/2016 Yes    Will ultrasound     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 - May 2014             Hyperlipidemia (Chronic) ICD-10-CM: E78.5  ICD-9-CM: 272.4  9/13/2013 Yes        HTN (hypertension) (Chronic) ICD-10-CM: I10  ICD-9-CM: 401.9  9/13/2013 Yes        CAD (coronary artery disease) (Chronic) ICD-10-CM: I25.10  ICD-9-CM: 414.00  9/13/2013 Yes     11/2006:  LIMA-LAD, SVG-Dx, SVG-OM, SVG-RCA               COPD  No wheezing     Plan:  (Medical Decision Making)     --Continue Albuterol, Pulmicort  --Will ultrasound and plan thoracentesis. Platelets 938     More than 50% of the time documented was spent in face-to-face contact with the patient and in the care of the patient on the floor/unit where the patient is located. Thank you very much for this referral.  We appreciate the opportunity to participate in this patient's care. Will follow along with above stated plan. Dallas Chapman NP     Lungs:  Decreased BS bilaterally  Heart:  RRR with no Murmur/Rubs/Gallops; 2-3 + LE edema    Additional Comments:  Pt diuresing and feeling better. No distress at present. He has considerable LE edema.  Will hold on thoracentesis at present and consider doing in the next day or two once edema has improved with aggressive diuresis. Otherwise, he would likely need additional taps until the edema is reduced. Requires strict I/O.     Linda Baptiste MD

## 2024-02-21 NOTE — ED TRIAGE NOTES
Reports cough, SOB and swelling to ankles. States cough productive of white & clear sputum. On 3 liters oxygen at home. Awake/Alert/Cooperative

## (undated) DEVICE — SYR IRR CATH TIP LR ADPT 70ML -- CONVERT TO ITEM 363120

## (undated) DEVICE — TRAY PREP DRY W/ PREM GLV 2 APPL 6 SPNG 2 UNDPD 1 OVERWRAP

## (undated) DEVICE — KIT THORCENT 8FR L5IN POLYUR W/ 18/22/25GA NDL 3 W STPCOCK

## (undated) DEVICE — STERILE POLYISOPRENE POWDER-FREE SURGICAL GLOVES: Brand: PROTEXIS

## (undated) DEVICE — GEL MEDC ULTRASOUND 5L -- REPLACED BY 326862

## (undated) DEVICE — JAR SPEC COLL C30ML 15ML PREFILL VOL POLYPR 10% NEUT BUFF

## (undated) DEVICE — BAG DRNGE 4000ML CONT IRRIG ROUNDED TEARDROP SHP DISP

## (undated) DEVICE — SOLUTION IV 1000ML 0.9% SOD CHL

## (undated) DEVICE — HF-RESECTION ELECTRODE PLASMABUTTON BUTTON, 24 FR., 12°-30°, PK TURIS: Brand: OLYMPUS

## (undated) DEVICE — SOLUTION IRRIG 3000ML 0.9% SOD CHL FLX CONT 0797208] ICU MEDICAL INC]

## (undated) DEVICE — Y-TYPE TUR/BLADDER IRRIGATION SET, REGULATING CLAMP

## (undated) DEVICE — PACK PROCEDURE SURG TRANSURETHRAL RESECT OF PROST CDS

## (undated) DEVICE — KENDALL SCD EXPRESS SLEEVES, KNEE LENGTH, MEDIUM: Brand: KENDALL SCD

## (undated) DEVICE — CATHETER URETH 22FR 30CC BLLN F 3 W SPEC M RND TIP TWO